# Patient Record
Sex: FEMALE | Race: WHITE | Employment: OTHER | RURAL
[De-identification: names, ages, dates, MRNs, and addresses within clinical notes are randomized per-mention and may not be internally consistent; named-entity substitution may affect disease eponyms.]

---

## 2017-03-21 RX ORDER — IBUPROFEN 800 MG/1
TABLET ORAL
Qty: 90 TAB | Refills: 0 | Status: SHIPPED | OUTPATIENT
Start: 2017-03-21 | End: 2017-12-21 | Stop reason: SDUPTHER

## 2017-03-21 NOTE — TELEPHONE ENCOUNTER
Patient is scheduled with Risa Corona on April 13. She states she's left her ibuprofen in [facility] so she's in need of it.

## 2017-03-24 RX ORDER — VENLAFAXINE 100 MG/1
TABLET ORAL
Qty: 60 TAB | Refills: 5 | Status: SHIPPED | OUTPATIENT
Start: 2017-03-24 | End: 2017-04-13 | Stop reason: SDUPTHER

## 2017-04-13 ENCOUNTER — OFFICE VISIT (OUTPATIENT)
Dept: FAMILY MEDICINE CLINIC | Age: 62
End: 2017-04-13

## 2017-04-13 VITALS
RESPIRATION RATE: 20 BRPM | TEMPERATURE: 97.9 F | DIASTOLIC BLOOD PRESSURE: 66 MMHG | OXYGEN SATURATION: 92 % | WEIGHT: 194.6 LBS | HEIGHT: 69 IN | HEART RATE: 81 BPM | BODY MASS INDEX: 28.82 KG/M2 | SYSTOLIC BLOOD PRESSURE: 102 MMHG

## 2017-04-13 DIAGNOSIS — Z79.4 UNCONTROLLED TYPE 2 DIABETES MELLITUS WITH HYPERGLYCEMIA, WITH LONG-TERM CURRENT USE OF INSULIN (HCC): ICD-10-CM

## 2017-04-13 DIAGNOSIS — E78.2 MIXED HYPERLIPIDEMIA: ICD-10-CM

## 2017-04-13 DIAGNOSIS — E11.65 UNCONTROLLED TYPE 2 DIABETES MELLITUS WITH HYPERGLYCEMIA, WITH LONG-TERM CURRENT USE OF INSULIN (HCC): ICD-10-CM

## 2017-04-13 DIAGNOSIS — J67.9 HYPERSENSITIVITY PNEUMONITIS (HCC): Primary | ICD-10-CM

## 2017-04-13 RX ORDER — OMEPRAZOLE 40 MG/1
CAPSULE, DELAYED RELEASE ORAL
COMMUNITY
End: 2017-04-13 | Stop reason: SDUPTHER

## 2017-04-13 RX ORDER — VENLAFAXINE 100 MG/1
TABLET ORAL
Qty: 180 TAB | Refills: 1 | Status: SHIPPED | OUTPATIENT
Start: 2017-04-13 | End: 2018-03-05 | Stop reason: SDUPTHER

## 2017-04-13 RX ORDER — SIMVASTATIN 40 MG/1
TABLET, FILM COATED ORAL
Qty: 90 TAB | Refills: 1 | Status: SHIPPED | OUTPATIENT
Start: 2017-04-13 | End: 2017-10-24 | Stop reason: ALTCHOICE

## 2017-04-13 RX ORDER — OMEPRAZOLE 40 MG/1
CAPSULE, DELAYED RELEASE ORAL
Qty: 90 CAP | Refills: 1 | Status: SHIPPED | OUTPATIENT
Start: 2017-04-13 | End: 2017-11-20 | Stop reason: SDUPTHER

## 2017-04-13 RX ORDER — MYCOPHENOLATE MOFETIL 500 MG/1
TABLET ORAL
COMMUNITY
End: 2017-08-17 | Stop reason: SDUPTHER

## 2017-04-13 RX ORDER — DAPSONE 100 MG/1
TABLET ORAL
COMMUNITY
End: 2017-10-25 | Stop reason: SDUPTHER

## 2017-04-13 NOTE — MR AVS SNAPSHOT
Visit Information Date & Time Provider Department Dept. Phone Encounter #  
 4/13/2017  4:00 PM Anh Nicholas NP [address] [phone] [id number] Upcoming Health Maintenance Date Due Hepatitis C Screening 1955 MICROALBUMIN Q1 10/14/1964 DTaP/Tdap/Td series (1 - Tdap) 10/14/1975 FOBT Q 1 YEAR AGE 50-75 10/14/2004 ZOSTER VACCINE AGE 60> 10/14/2014 HEMOGLOBIN A1C Q6M 3/8/2016 LIPID PANEL Q1 9/8/2016 EYE EXAM RETINAL OR DILATED Q1 3/24/2017 FOOT EXAM Q1 7/18/2017 BREAST CANCER SCRN MAMMOGRAM 8/17/2018 PAP AKA CERVICAL CYTOLOGY 9/16/2019 Allergies as of 4/13/2017  Review Complete On: 4/13/2017 By: Jeffrey Galeano RN Severity Noted Reaction Type Reactions Cephalosporins  08/19/2013    Unknown (comments) Told to avoid by doctor Codeine  08/19/2013    Vertigo Doxycycline  08/19/2013    Unknown (comments) Lodine [Etodolac]  11/27/2013    Hives Sweats, shakes Penicillins  08/19/2013    Hives Sulfa (Sulfonamide Antibiotics)  08/19/2013    Rash Current Immunizations  Reviewed on 12/15/2016 Name Date Hep A Vaccine (Adult) 10/11/2016 Hep B Vaccine (Adult) 12/15/2016, 10/11/2016 Influenza High Dose Vaccine PF 10/11/2016 Influenza Vaccine (Quad) 10/14/2015  4:46 PM  
 Pneumococcal Conjugate (PCV-13) 3/16/2016 Pneumococcal Polysaccharide (PPSV-23) 8/1/2013 Not reviewed this visit You Were Diagnosed With   
  
 Codes Comments Hypersensitivity pneumonitis ([facility] [address]    -  Primary ICD-10-CM: J67.9 ICD-9-CM: 495.9 Vitals BP Pulse Temp Resp Height(growth percentile) 102/66 (BP 1 Location: Left arm, BP Patient Position: Sitting) 81 97.9 °F (36.6 °C) (Temporal) 20 5' 9\" (1.753 m) Weight(growth percentile) SpO2 BMI OB Status Smoking Status 194 lb 9.6 oz (88.3 kg) 92% 28.74 kg/m2 Postmenopausal Former Smoker Vitals History BMI and BSA Data Body Mass Index Body Surface Area 28.74 kg/m 2 2.07 m 2 Preferred Pharmacy Pharmacy Name Phone [phone], [place] [phone] Your Updated Medication List  
  
   
This list is accurate as of: 4/13/17  5:04 PM.  Always use your most recent med list.  
  
  
  
  
 ALPRAZolam 0.5 mg tablet Commonly known as:  He Davis Take 1 Tab by mouth nightly as needed for Anxiety or Sleep. Max Daily Amount: 0.5 mg. Indications: ANXIETY  
  
 aspirin 81 mg tablet Take 81 mg by mouth.  
  
 calcium-vitamin D 500 mg(1,250mg) -200 unit per tablet Commonly known as:  OYSTER SHELL Take 1 Tab by mouth two (2) times daily (with meals). dapsone 100 mg tablet Take 100 mg by mouth daily. FIBER LAXATIVE (CA POLYCARBO) 625 mg tablet Generic drug:  calcium polycarbophil Take 625 mg by mouth daily. Started in december  
  
 fluticasone 50 mcg/actuation nasal spray Commonly known as:  FLONASE  
2 sprays in each nostril once a day. glucose blood VI test strips strip Commonly known as:  ASCENSIA AUTODISC VI, ONE TOUCH ULTRA TEST VI  
Use as directed. DX E11.65 HumaLOG KwikPen 100 unit/mL kwikpen Generic drug:  insulin lispro INJECT 4 units SUBCUTANEOUSLY WITH MEALS AS DIRECTED  
  
 ibuprofen 800 mg tablet Commonly known as:  MOTRIN  
TAKE ONE TABLET BY MOUTH EVERY 6 HOURS WITH FOOD AS NEEDED  Indications: OSTEOARTHRITIS  
  
 insulin detemir 100 unit/mL (3 mL) Inpn Commonly known as:  LEVEMIR FLEXPEN  
40 units sq bid  Indications: DIABETES MELLITUS WITH SEVERE INSULIN RESISTANCE  
  
 insulin syringe-needle U-100 1/2 mL 31 gauge x 15/64\" Syrg Commonly known as:  BD INSULIN SYRINGE ULTRA-FINE  
1 Units by Does Not Apply route daily as needed. DX E11.65  
  
 latanoprost 0.005 % ophthalmic solution Commonly known as:  Maria Elena Gonzáles Administer 1 Drop to both eyes nightly. losartan 100 mg tablet Commonly known as:  COZAAR Take 1 Tab by mouth daily. meclizine 12.5 mg tablet Commonly known as:  ANTIVERT Take 12.5 mg by mouth three (3) times daily as needed. TAKE 1 PILL A DAY PRN  
  
 metFORMIN  mg tablet Commonly known as:  GLUCOPHAGE XR Take 1 Tab by mouth two (2) times daily (with meals). multivitamin tablet Commonly known as:  ONE A DAY Take 1 Tab by mouth daily. mycophenolate 500 mg tablet Commonly known as:  CELLCEPT Take 1,000 mg by mouth 2 times daily. Do not chew or crush tablet Omega-3-DHA-EPA-Fish Oil 1,000 mg (120 mg-180 mg) Cap Take  by mouth. TAKE TWICE A DAY  
  
 omeprazole 40 mg capsule Commonly known as:  PRILOSEC Take 40 mg by mouth daily. simvastatin 40 mg tablet Commonly known as:  ZOCOR  
TAKE ONE TABLET BY MOUTH EVERY NIGHT  
  
 timolol maleate 0.5 % Drpd ophthalmic solution Administer 1 Drop to both eyes daily. venlafaxine 100 mg tablet Commonly known as:  EFFEXOR  
TAKE ONE TABLET BY MOUTH 2 TIMES A DAY Prescriptions Sent to Pharmacy Refills  
 venlafaxine (EFFEXOR) 100 mg tablet 1 Sig: TAKE ONE TABLET BY MOUTH 2 TIMES A DAY Class: Normal  
 Pharmacy: [address] [place] [address] Ph #: [phone]  
 omeprazole (PRILOSEC) 40 mg capsule 1 Sig: Take 40 mg by mouth daily. Class: Normal  
 Pharmacy: [address] [place] [address] Ph #: [phone]  
 simvastatin (ZOCOR) 40 mg tablet 1 Sig: TAKE ONE TABLET BY MOUTH EVERY NIGHT Class: Normal  
 Pharmacy: [address] [place] [address] Ph #: [phone] We Performed the Following REFERRAL TO PULMONARY DISEASE [W52 Custom] Comments:  
 Dr. Trenton Kincaid NEEDS A PRIOR AUTHORIZATION PER INSURANCE. Dr. Ian Ahumada. Rosi Bender MD 
 
 
Internist in [place], [place] Address: [address] [place] [address] [place] Phone: [phone] Referral Information Referral ID Referred By Referred To  
  
 [id number] Catana Meigs [facility] [place] [address] [place] [address] [facility] Visits Status Start Date End Date 1 New Request 4/13/17 4/13/18 If your referral has a status of pending review or denied, additional information will be sent to support the outcome of this decision. Patient Instructions Learning About Healthy Weight What is a healthy weight? A healthy weight is the weight at which you feel good about yourself and have energy for work and play. It's also one that lowers your risk for health problems. What can you do to stay at a healthy weight? It can be hard to stay at a healthy weight, especially when fast food, vending-machine snacks, and processed foods are so easy to find. And with your busy lifestyle, activity may be low on your list of things to do. But staying at a healthy weight may be easier than you think. Here are some dos and don'ts for staying at a healthy weight: 
Do eat healthy foods The kinds of foods you eat have a big impact on both your weight and your health. Reaching and staying at a healthy weight is not about going on a diet. It's about making healthier food choices every day and changing your diet for good. Healthy eating means eating a variety of foods so that you get all the nutrients you need. Your body needs protein, carbohydrate, and fats for energy. They keep your heart beating, your brain active, and your muscles working. On most days, try to eat from each food group. This means eating a variety of: · Whole grains, such as whole wheat breads and pastas. · Fruits and vegetables. · Dairy products, such as low-fat milk, yogurt, and cheese. · Lean proteins, such as all types of fish, chicken without the skin, and beans. Don't have too much or too little of one thing. All foods, if eaten in moderation, can be part of healthy eating. Even sweets can be okay. If your favorite foods are high in fat, salt, sugar, or calories, limit how often you eat them. Eat smaller servings, or look for healthy substitutes. Do watch what you eat Many people eat more than their bodies need. Part of staying at a healthy weight means learning how much food you really need from day to day and not eating more than that. Even with healthy foods, eating too much can make you gain weight. Having a well-balanced diet means that you eat enough, but not too much, and that your food gives you the nutrients you need to stay healthy. So listen to your body. Eat when you're hungry. Stop when you feel satisfied. It's a good idea to have healthy snacks ready for when you get hungry. Keep healthy snacks with you at work, in your car, and at home. If you have a healthy snack easily available, you'll be less likely to pick a candy bar or bag of chips from a vending machine instead. Some healthy snacks you might want to keep on hand are fruit, low-fat yogurt, string cheese, low-fat microwave popcorn, raisins and other dried fruit, nuts, whole wheat crackers, pretzels, carrots, celery sticks, and broccoli. Do some physical activity A big part of reaching and staying at a healthy weight is being active. When you're active, you burn calories. This makes it easier to reach and stay at a healthy weight. When you're active on a regular basis, your body burns more calories, even when you're at rest. Being active helps you lose fat and build lean muscle. Try to be active for at least 1 hour every day. This may sound like a lot, but it's okay to be active in smaller blocks of time that add up to 1 hour a day. Any activity that makes your heart beat faster and keeps it there for a while counts.  A brisk walk, run, or swim will get your heart beating faster. So will climbing stairs, shooting baskets, or cycling. Even some household chores like vacuuming and mowing the lawn will get your heart rate up. Pick activities that you enjoyones that make your heart beat faster, your muscles stronger, and your muscles and joints more flexible. If you find more than one thing you like doing, do them all. You don't have to do the same thing every day. Don't diet Diets don't work. Diets are temporary. Because you give up so much when you diet, you may be hungry and think about food all the time. And after you stop dieting, you also may overeat to make up for what you missed. Most people who diet end up gaining back the pounds they lostand more. Remember that healthy bodies come in lots of shapes and sizes. Everyone can get healthier by eating better and being more active. Where can you learn more? Go to http://teodoroKimbiaroxi.info/. Enter 122 1418 in the search box to learn more about \"Learning About Healthy Weight. \" Current as of: October 13, 2016 Content Version: 11.2 © 3190-1828 [employer]QUAL. Care instructions adapted under license by [employer] (which disclaims liability or warranty for this information). If you have questions about a medical condition or this instruction, always ask your healthcare professional. [address] any warranty or liability for your use of this information. Learning About Dietary Guidelines What are the Dietary Guidelines for Americans? Dietary Guidelines for Americans provide tips for eating well and staying healthy. This helps reduce the risk for long-term (chronic) diseases. These adult guidelines from the [place] recommend that you: 
· Eat lots of fruits, vegetables, whole grains, and low-fat or nonfat dairy products. · Try to balance your eating with your activity. This helps you stay at a healthy weight. · Drink alcohol in moderation, if at all. · Limit foods high in salt, saturated fat, trans fat, and added sugar. What is MyPlate? MyPlate is the U.S. government's food guide. It can help you make your own well-balanced eating plan. A balanced eating plan means that you eat enough, but not too much, and that your food gives you the nutrients you need to stay healthy. MyPlate focuses on eating plenty of whole grains, fruits, and vegetables, and on limiting fat and sugar. It is available online at www. ChooseMyPlate.gov. How can you get started? MyPlate suggests that most adults eat certain amounts from the different food groups: 
Grains Eat 5 to 8 ounces of grains each day. Half of those should be whole grains. Choose whole-grain breads, cold and cooked cereals and grains, pasta (without creamy sauces), hard rolls, or low-fat or fat-free crackers. Vegetables Eat 2 to 3 cups of vegetables every day. They contain little if any fat. And they have lots of nutrients that help protect against heart disease. Fruits Eat 1½ to 2 cups of fruits every day. Fruits contain very little fat but lots of nutrients. Protein foods Most adults need 5 to 6½ ounces each day. Choose fish and lean poultry more often. Eat red meat and fried meats less often. Dried beans, tofu, and nuts are also good sources of protein. Dairy Most adults need 3 cups of milk and milk products a day. Choose low-fat or fat-free products from this food group. If you have problems digesting milk, try eating cheese or yogurt instead. Limit fats and oils, including those used in cooking. When you do use fats, choose oils that are liquid at room temperature (unsaturated fats). These include canola oil and olive oil. Avoid foods with trans fats, such as many fried foods, cookies, and snack foods. Where can you learn more? Go to http://teodoro-roxi.info/.  
Enter H970 in the search box to learn more about \"Learning About Dietary Guidelines. \" Current as of: July 26, 2016 Content Version: 11.2 © 9323-2194 Filmmortal. Care instructions adapted under license by [employer] (which disclaims liability or warranty for this information). If you have questions about a medical condition or this instruction, always ask your healthcare professional. [address] any warranty or liability for your use of this information. Heart-Healthy Diet: Care Instructions Your Care Instructions A heart-healthy diet has lots of vegetables, fruits, nuts, beans, and whole grains, and is low in salt. It limits foods that are high in saturated fat, such as meats, cheeses, and fried foods. It may be hard to change your diet, but even small changes can lower your risk of heart attack and heart disease. Follow-up care is a key part of your treatment and safety. Be sure to make and go to all appointments, and call your doctor if you are having problems. It's also a good idea to know your test results and keep a list of the medicines you take. How can you care for yourself at home? Watch your portions · Learn what a serving is. A \"serving\" and a \"portion\" are not always the same thing. Make sure that you are not eating larger portions than are recommended. For example, a serving of pasta is ½ cup. A serving size of meat is 2 to 3 ounces. A 3-ounce serving is about the size of a deck of cards. Measure serving sizes until you are good at [place]" them. Keep in mind that restaurants often serve portions that are 2 or 3 times the size of one serving. · To keep your energy level up and keep you from feeling hungry, eat often but in smaller portions. · Eat only the number of calories you need to stay at a healthy weight. If you need to lose weight, eat fewer calories than your body burns (through exercise and other physical activity). Eat more fruits and vegetables · Eat a variety of fruit and vegetables every day. Dark green, deep orange, red, or yellow fruits and vegetables are especially good for you. Examples include spinach, carrots, peaches, and berries. · Keep carrots, celery, and other veggies handy for snacks. Buy fruit that is in season and store it where you can see it so that you will be tempted to eat it. · Cook dishes that have a lot of veggies in them, such as stir-fries and soups. Limit saturated and trans fat · Read food labels, and try to avoid saturated and trans fats. They increase your risk of heart disease. Trans fat is found in many processed foods such as cookies and crackers. · Use olive or canola oil when you cook. Try cholesterol-lowering spreads, such as Benecol or Take Control. · Bake, broil, grill, or steam foods instead of frying them. · Choose lean meats instead of high-fat meats such as hot dogs and sausages. Cut off all visible fat when you prepare meat. · Eat fish, skinless poultry, and meat alternatives such as soy products instead of high-fat meats. Soy products, such as tofu, may be especially good for your heart. · Choose low-fat or fat-free milk and dairy products. Eat fish · Eat at least two servings of fish a week. Certain fish, such as salmon and tuna, contain omega-3 fatty acids, which may help reduce your risk of heart attack. Eat foods high in fiber · Eat a variety of grain products every day. Include whole-grain foods that have lots of fiber and nutrients. Examples of whole-grain foods include oats, whole wheat bread, and brown rice. · Buy whole-grain breads and cereals, instead of white bread or pastries. Limit salt and sodium · Limit how much salt and sodium you eat to help lower your blood pressure. · Taste food before you salt it. Add only a little salt when you think you need it. With time, your taste buds will adjust to less salt. · Eat fewer snack items, fast foods, and other high-salt, processed foods. Check food labels for the amount of sodium in packaged foods. · Choose low-sodium versions of canned goods (such as soups, vegetables, and beans). Limit sugar · Limit drinks and foods with added sugar. These include candy, desserts, and soda pop. Limit alcohol · Limit alcohol to no more than 2 drinks a day for men and 1 drink a day for women. Too much alcohol can cause health problems. When should you call for help? Watch closely for changes in your health, and be sure to contact your doctor if: 
· You would like help planning heart-healthy meals. Where can you learn more? Go to http://teodoro-roxi.info/. Enter V137 in the search box to learn more about \"Heart-Healthy Diet: Care Instructions. \" Current as of: January 27, 2016 Content Version: 11.2 © 9727-0598 [employer] Mask. Care instructions adapted under license by flo.do (which disclaims liability or warranty for this information). If you have questions about a medical condition or this instruction, always ask your healthcare professional. [place] [address] any warranty or liability for your use of this information. Eating Healthy Foods: Care Instructions Your Care Instructions Eating healthy foods can help lower your risk for disease. Healthy food gives you energy and keeps your heart strong, your brain active, your muscles working, and your bones strong. A healthy diet includes a variety of foods from the basic food groups: grains, vegetables, fruits, milk and milk products, and meat and beans. Some people may eat more of their favorite foods from only one food group and, as a result, miss getting the nutrients they need. So, it is important to pay attention not only to what you eat but also to what you are missing from your diet. You can eat a healthy, balanced diet by making a few small changes. Follow-up care is a key part of your treatment and safety.  Be sure to make and go to all appointments, and call your doctor if you are having problems. Its also a good idea to know your test results and keep a list of the medicines you take. How can you care for yourself at home? Look at what you eat · Keep a food diary for a week or two and record everything you eat or drink. Track the number of servings you eat from each food group. · For a balanced diet every day, eat a variety of: ¨ 6 or more ounce-equivalents of grains, such as cereals, breads, crackers, rice, or pasta, every day. An ounce-equivalent is 1 slice of bread, 1 cup of ready-to-eat cereal, or ½ cup of cooked rice, cooked pasta, or cooked cereal. 
¨ 2½ cups of vegetables, especially: § Dark-green vegetables such as broccoli and spinach. § Orange vegetables such as carrots and sweet potatoes. § Dry beans (such as fam and kidney beans) and peas (such as lentils). ¨ 2 cups of fresh, frozen, or canned fruit. A small apple or 1 banana or orange equals 1 cup. ¨ 3 cups of nonfat or low-fat milk, yogurt, or other milk products. ¨ 5½ ounces of meat and beans, such as chicken, fish, lean meat, beans, nuts, and seeds. One egg, 1 tablespoon of peanut butter, ½ ounce nuts or seeds, or ¼ cup of cooked beans equals 1 ounce of meat. · Learn how to read food labels for serving sizes and ingredients. Fast-food and convenience-food meals often contain few or no fruits or vegetables. Make sure you eat some fruits and vegetables to make the meal more nutritious. · Look at your food diary. For each food group, add up what you have eaten and then divide the total by the number of days. This will give you an idea of how much you are eating from each food group. See if you can find some ways to change your diet to make it more healthy. Start small · Do not try to make dramatic changes to your diet all at once. You might feel that you are missing out on your favorite foods and then be more likely to fail. · Start slowly, and gradually change your habits. Try some of the following: ¨ Use whole wheat bread instead of white bread. ¨ Use nonfat or low-fat milk instead of whole milk. ¨ Eat brown rice instead of white rice, and eat whole wheat pasta instead of white-flour pasta. ¨ Try low-fat cheeses and low-fat yogurt. ¨ Add more fruits and vegetables to meals and have them for snacks. ¨ Add lettuce, tomato, cucumber, and onion to sandwiches. ¨ Add fruit to yogurt and cereal. 
Enjoy food · You can still eat your favorite foods. You just may need to eat less of them. If your favorite foods are high in fat, salt, and sugar, limit how often you eat them, but do not cut them out entirely. · Eat a wide variety of foods. Make healthy choices when eating out · The type of restaurant you choose can help you make healthy choices. Even fast-food chains are now offering more low-fat or healthier choices on the menu. · Choose smaller portions, or take half of your meal home. · When eating out, try: ¨ A veggie pizza with a whole wheat crust or grilled chicken (instead of sausage or pepperoni). ¨ Pasta with roasted vegetables, grilled chicken, or marinara sauce instead of cream sauce. ¨ A vegetable wrap or grilled chicken wrap. ¨ Broiled or poached food instead of fried or breaded items. Make healthy choices easy · Buy packaged, prewashed, ready-to-eat fresh vegetables and fruits, such as baby carrots, salad mixes, and chopped or shredded broccoli and cauliflower. · Buy packaged, presliced fruits, such as melon or pineapple. · Choose 100% fruit or vegetable juice instead of soda. Limit juice intake to 4 to 6 oz (½ to ¾ cup) a day. · Blend low-fat yogurt, fruit juice, and canned or frozen fruit to make a smoothie for breakfast or a snack. Where can you learn more? Go to http://teodoro-roxi.info/. Enter T756 in the search box to learn more about \"Eating Healthy Foods: Care Instructions. \" 
 Current as of: November 20, 2015 Content Version: 11.2 © 7412-5406 [employer] Exam Prep Platform. Care instructions adapted under license by [employer] (which disclaims liability or warranty for this information). If you have questions about a medical condition or this instruction, always ask your healthcare professional. [address] any warranty or liability for your use of this information. Food as Fuel: Care Instructions Your Care Instructions A healthy, balanced diet gives your body nutrients. Nutrients are like fuel for your body. They give you energy. And they keep your heart beating, your brain active, and your muscles working. They also help to build and strengthen bones, muscles, and other body tissues. Your body needs three major nutrients for energy. These are carbohydrate, protein, and fat. · Carbohydrate provides energy for your brain, muscles, heart, and lungs. It is found in bread, cereal, rice, pasta, fruits, vegetables, milk, yogurt, and sugar. · Protein provides energy and helps build and repair your body's cells. It is found in meat, poultry, fish, cooked dry beans, cheese, tofu and other soy products, nuts and seeds, and milk and milk products. · Fat provides energy, helps build the covering around nerves in your body, and helps make hormones. Fat is found in butter, margarine, oil, mayonnaise, salad dressing, and nuts. It is also found in most foods that come from animals, such as meat and milk products. Many foods also have fat added to them. Your body needs all three of these nutrients to be healthy. If you choose a good mix of foods, you can help your body get the right amounts of carbohydrate, protein, and fat. It can also keep you at a healthy weight. Follow-up care is a key part of your treatment and safety.  Be sure to make and go to all appointments, and call your doctor if you are having problems. It's also a good idea to know your test results and keep a list of the medicines you take. How can you care for yourself at home? Eat a balanced diet · Try to eat variety of healthy foods every day. These include: ¨ 5 to 8 ounces of bread, cereal, crackers, rice, or pasta. An ounce is about 1 slice of bread, ¾ cup of breakfast cereal, or ½ cup of cooked rice, cereal, or pasta. Choose whole-grain products for at least half of your grain servings. ¨ 2 to 3 cups of vegetables. Be sure to include: § Dark green vegetables such as broccoli and spinach. § Orange vegetables such as carrots and sweet potatoes. ¨ 1½ to 2 cups of fresh, frozen, or canned fruit. A banana, an orange, or a large apple equals 1 cup. ¨ 3 cups of nonfat or low-fat milk, yogurt, or other milk products. ¨ 5 to 6½ ounces of protein foods. These include chicken, fish, lean meat, beans, nuts, and seeds. One egg equals 1 ounce of meat, poultry, or fish. A ½ cup of cooked beans equals 2 ounces of protein. Stay fueled all day · Start your day with breakfast. If you don't have time to sit down for a bowl of cereal in the morning, try something that you can eat \"on the go. \" Try a piece of whole wheat bread with peanut butter or a container of yogurt with frozen berries mixed in. · Eat regularly scheduled meals and snacks. If you miss a meal, you may overeat at the next meal. Or you may choose a less healthy snack. · Drink enough water. (If you have kidney, heart, or liver disease and have to limit fluids, talk with your doctor before you increase your fluid intake.) Where can you learn more? Go to http://teodoro-roxi.info/. Enter T090 in the search box to learn more about \"Food as Fuel: Care Instructions. \" Current as of: July 26, 2016 Content Version: 11.2 © 5864-2010 [employer].  Care instructions adapted under license by [employer] (which disclaims liability or warranty for this information). If you have questions about a medical condition or this instruction, always ask your healthcare professional. [address] any warranty or liability for your use of this information. Introducing [facility]! Dear Tosha Watkins: Thank you for requesting a ally[facility]VM account. Our records indicate that you have previously registered for a allyDVM account but its currently inactive. Please call our allyDVM support line at [phone]. Additional Information If you have questions, please visit the Frequently Asked Questions section of the allyDVM website at [email or web address] Remember, allyDVM is NOT to be used for urgent needs. For medical emergencies, dial 911. Now available from your iPhone and Android! Please provide this summary of care documentation to your next provider. Your primary care clinician is listed as Rolando Conde. If you have any questions after today's visit, please call [phone].

## 2017-04-13 NOTE — PATIENT INSTRUCTIONS
Learning About Healthy Weight  What is a healthy weight? A healthy weight is the weight at which you feel good about yourself and have energy for work and play. It's also one that lowers your risk for health problems. What can you do to stay at a healthy weight? It can be hard to stay at a healthy weight, especially when fast food, vending-machine snacks, and processed foods are so easy to find. And with your busy lifestyle, activity may be low on your list of things to do. But staying at a healthy weight may be easier than you think. Here are some dos and don'ts for staying at a healthy weight:  Do eat healthy foods  The kinds of foods you eat have a big impact on both your weight and your health. Reaching and staying at a healthy weight is not about going on a diet. It's about making healthier food choices every day and changing your diet for good. Healthy eating means eating a variety of foods so that you get all the nutrients you need. Your body needs protein, carbohydrate, and fats for energy. They keep your heart beating, your brain active, and your muscles working. On most days, try to eat from each food group. This means eating a variety of:  · Whole grains, such as whole wheat breads and pastas. · Fruits and vegetables. · Dairy products, such as low-fat milk, yogurt, and cheese. · Lean proteins, such as all types of fish, chicken without the skin, and beans. Don't have too much or too little of one thing. All foods, if eaten in moderation, can be part of healthy eating. Even sweets can be okay. If your favorite foods are high in fat, salt, sugar, or calories, limit how often you eat them. Eat smaller servings, or look for healthy substitutes. Do watch what you eat  Many people eat more than their bodies need. Part of staying at a healthy weight means learning how much food you really need from day to day and not eating more than that.  Even with healthy foods, eating too much can make you gain weight. Having a well-balanced diet means that you eat enough, but not too much, and that your food gives you the nutrients you need to stay healthy. So listen to your body. Eat when you're hungry. Stop when you feel satisfied. It's a good idea to have healthy snacks ready for when you get hungry. Keep healthy snacks with you at work, in your car, and at home. If you have a healthy snack easily available, you'll be less likely to pick a candy bar or bag of chips from a vending machine instead. Some healthy snacks you might want to keep on hand are fruit, low-fat yogurt, string cheese, low-fat microwave popcorn, raisins and other dried fruit, nuts, whole wheat crackers, pretzels, carrots, celery sticks, and broccoli. Do some physical activity  A big part of reaching and staying at a healthy weight is being active. When you're active, you burn calories. This makes it easier to reach and stay at a healthy weight. When you're active on a regular basis, your body burns more calories, even when you're at rest. Being active helps you lose fat and build lean muscle. Try to be active for at least 1 hour every day. This may sound like a lot, but it's okay to be active in smaller blocks of time that add up to 1 hour a day. Any activity that makes your heart beat faster and keeps it there for a while counts. A brisk walk, run, or swim will get your heart beating faster. So will climbing stairs, shooting baskets, or cycling. Even some household chores like vacuuming and mowing the lawn will get your heart rate up. Pick activities that you enjoy--ones that make your heart beat faster, your muscles stronger, and your muscles and joints more flexible. If you find more than one thing you like doing, do them all. You don't have to do the same thing every day. Don't diet  Diets don't work. Diets are temporary. Because you give up so much when you diet, you may be hungry and think about food all the time.  And after you stop dieting, you also may overeat to make up for what you missed. Most people who diet end up gaining back the pounds they lost--and more. Remember that healthy bodies come in lots of shapes and sizes. Everyone can get healthier by eating better and being more active. Where can you learn more? Go to http://teodoro-roxi.info/. Enter 418 6439 in the search box to learn more about \"Learning About Healthy Weight. \"  Current as of: October 13, 2016  Content Version: 11.2  © 9358-9463 [employer]. Care instructions adapted under license by [employer] (which disclaims liability or warranty for this information). If you have questions about a medical condition or this instruction, always ask your healthcare professional. [address] any warranty or liability for your use of this information. Learning About Dietary Guidelines  What are the Dietary Guidelines for Americans? Dietary Guidelines for Americans provide tips for eating well and staying healthy. This helps reduce the risk for long-term (chronic) diseases. These adult guidelines from the [place] recommend that you:  · Eat lots of fruits, vegetables, whole grains, and low-fat or nonfat dairy products. · Try to balance your eating with your activity. This helps you stay at a healthy weight. · Drink alcohol in moderation, if at all. · Limit foods high in salt, saturated fat, trans fat, and added sugar. What is MyPlate? MyPlate is the [place] government's food guide. It can help you make your own well-balanced eating plan. A balanced eating plan means that you eat enough, but not too much, and that your food gives you the nutrients you need to stay healthy. MyPlate focuses on eating plenty of whole grains, fruits, and vegetables, and on limiting fat and sugar. It is available online at www. ChooseMyPlate.gov. How can you get started?   MyPlate suggests that most adults eat certain amounts from the different food groups:  Grains  Eat 5 to 8 ounces of grains each day. Half of those should be whole grains. Choose whole-grain breads, cold and cooked cereals and grains, pasta (without creamy sauces), hard rolls, or low-fat or fat-free crackers. Vegetables  Eat 2 to 3 cups of vegetables every day. They contain little if any fat. And they have lots of nutrients that help protect against heart disease. Fruits  Eat 1½ to 2 cups of fruits every day. Fruits contain very little fat but lots of nutrients. Protein foods  Most adults need 5 to 6½ ounces each day. Choose fish and lean poultry more often. Eat red meat and fried meats less often. Dried beans, tofu, and nuts are also good sources of protein. Dairy  Most adults need 3 cups of milk and milk products a day. Choose low-fat or fat-free products from this food group. If you have problems digesting milk, try eating cheese or yogurt instead. Limit fats and oils, including those used in cooking. When you do use fats, choose oils that are liquid at room temperature (unsaturated fats). These include canola oil and olive oil. Avoid foods with trans fats, such as many fried foods, cookies, and snack foods. Where can you learn more? Go to http://teodoro-roxi.info/. Enter X829 in the search box to learn more about \"Learning About Dietary Guidelines. \"  Current as of: July 26, 2016  Content Version: 11.2  © 5940-6932 [employer]. Care instructions adapted under license by [employer] (which disclaims liability or warranty for this information). If you have questions about a medical condition or this instruction, always ask your healthcare professional. [place] [address] any warranty or liability for your use of this information.        Heart-Healthy Diet: Care Instructions  Your Care Instructions    A heart-healthy diet has lots of vegetables, fruits, nuts, beans, and whole grains, and is low in salt. It limits foods that are high in saturated fat, such as meats, cheeses, and fried foods. It may be hard to change your diet, but even small changes can lower your risk of heart attack and heart disease. Follow-up care is a key part of your treatment and safety. Be sure to make and go to all appointments, and call your doctor if you are having problems. It's also a good idea to know your test results and keep a list of the medicines you take. How can you care for yourself at home? Watch your portions  · Learn what a serving is. A \"serving\" and a \"portion\" are not always the same thing. Make sure that you are not eating larger portions than are recommended. For example, a serving of pasta is ½ cup. A serving size of meat is 2 to 3 ounces. A 3-ounce serving is about the size of a deck of cards. Measure serving sizes until you are good at Rock" them. Keep in mind that restaurants often serve portions that are 2 or 3 times the size of one serving. · To keep your energy level up and keep you from feeling hungry, eat often but in smaller portions. · Eat only the number of calories you need to stay at a healthy weight. If you need to lose weight, eat fewer calories than your body burns (through exercise and other physical activity). Eat more fruits and vegetables  · Eat a variety of fruit and vegetables every day. Dark green, deep orange, red, or yellow fruits and vegetables are especially good for you. Examples include spinach, carrots, peaches, and berries. · Keep carrots, celery, and other veggies handy for snacks. Buy fruit that is in season and store it where you can see it so that you will be tempted to eat it. · Cook dishes that have a lot of veggies in them, such as stir-fries and soups. Limit saturated and trans fat  · Read food labels, and try to avoid saturated and trans fats. They increase your risk of heart disease.  Trans fat is found in many processed foods such as cookies and crackers. · Use olive or canola oil when you cook. Try cholesterol-lowering spreads, such as Benecol or Take Control. · Bake, broil, grill, or steam foods instead of frying them. · Choose lean meats instead of high-fat meats such as hot dogs and sausages. Cut off all visible fat when you prepare meat. · Eat fish, skinless poultry, and meat alternatives such as soy products instead of high-fat meats. Soy products, such as tofu, may be especially good for your heart. · Choose low-fat or fat-free milk and dairy products. Eat fish  · Eat at least two servings of fish a week. Certain fish, such as salmon and tuna, contain omega-3 fatty acids, which may help reduce your risk of heart attack. Eat foods high in fiber  · Eat a variety of grain products every day. Include whole-grain foods that have lots of fiber and nutrients. Examples of whole-grain foods include oats, whole wheat bread, and brown rice. · Buy whole-grain breads and cereals, instead of white bread or pastries. Limit salt and sodium  · Limit how much salt and sodium you eat to help lower your blood pressure. · Taste food before you salt it. Add only a little salt when you think you need it. With time, your taste buds will adjust to less salt. · Eat fewer snack items, fast foods, and other high-salt, processed foods. Check food labels for the amount of sodium in packaged foods. · Choose low-sodium versions of canned goods (such as soups, vegetables, and beans). Limit sugar  · Limit drinks and foods with added sugar. These include candy, desserts, and soda pop. Limit alcohol  · Limit alcohol to no more than 2 drinks a day for men and 1 drink a day for women. Too much alcohol can cause health problems. When should you call for help? Watch closely for changes in your health, and be sure to contact your doctor if:  · You would like help planning heart-healthy meals. Where can you learn more?   Go to http://teodoro-roxi.info/. Enter V137 in the search box to learn more about \"Heart-Healthy Diet: Care Instructions. \"  Current as of: January 27, 2016  Content Version: 11.2  © 5664-4284 [employer]. Care instructions adapted under license by [email or web address] (which disclaims liability or warranty for this information). If you have questions about a medical condition or this instruction, always ask your healthcare professional. [address] any warranty or liability for your use of this information. Eating Healthy Foods: Care Instructions  Your Care Instructions  Eating healthy foods can help lower your risk for disease. Healthy food gives you energy and keeps your heart strong, your brain active, your muscles working, and your bones strong. A healthy diet includes a variety of foods from the basic food groups: grains, vegetables, fruits, milk and milk products, and meat and beans. Some people may eat more of their favorite foods from only one food group and, as a result, miss getting the nutrients they need. So, it is important to pay attention not only to what you eat but also to what you are missing from your diet. You can eat a healthy, balanced diet by making a few small changes. Follow-up care is a key part of your treatment and safety. Be sure to make and go to all appointments, and call your doctor if you are having problems. Its also a good idea to know your test results and keep a list of the medicines you take. How can you care for yourself at home? Look at what you eat  · Keep a food diary for a week or two and record everything you eat or drink. Track the number of servings you eat from each food group. · For a balanced diet every day, eat a variety of:  ¨ 6 or more ounce-equivalents of grains, such as cereals, breads, crackers, rice, or pasta, every day.  An ounce-equivalent is 1 slice of bread, 1 cup of ready-to-eat cereal, or ½ cup of cooked rice, cooked pasta, or cooked cereal.  ¨ 2½ cups of vegetables, especially:  § Dark-green vegetables such as broccoli and spinach. § Orange vegetables such as carrots and sweet potatoes. § Dry beans (such as fam and kidney beans) and peas (such as lentils). ¨ 2 cups of fresh, frozen, or canned fruit. A small apple or 1 banana or orange equals 1 cup. ¨ 3 cups of nonfat or low-fat milk, yogurt, or other milk products. ¨ 5½ ounces of meat and beans, such as chicken, fish, lean meat, beans, nuts, and seeds. One egg, 1 tablespoon of peanut butter, ½ ounce nuts or seeds, or ¼ cup of cooked beans equals 1 ounce of meat. · Learn how to read food labels for serving sizes and ingredients. Fast-food and convenience-food meals often contain few or no fruits or vegetables. Make sure you eat some fruits and vegetables to make the meal more nutritious. · Look at your food diary. For each food group, add up what you have eaten and then divide the total by the number of days. This will give you an idea of how much you are eating from each food group. See if you can find some ways to change your diet to make it more healthy. Start small  · Do not try to make dramatic changes to your diet all at once. You might feel that you are missing out on your favorite foods and then be more likely to fail. · Start slowly, and gradually change your habits. Try some of the following:  ¨ Use whole wheat bread instead of white bread. ¨ Use nonfat or low-fat milk instead of whole milk. ¨ Eat brown rice instead of white rice, and eat whole wheat pasta instead of white-flour pasta. ¨ Try low-fat cheeses and low-fat yogurt. ¨ Add more fruits and vegetables to meals and have them for snacks. ¨ Add lettuce, tomato, cucumber, and onion to sandwiches. ¨ Add fruit to yogurt and cereal.  Enjoy food  · You can still eat your favorite foods. You just may need to eat less of them.  If your favorite foods are high in fat, salt, and sugar, limit how often you eat them, but do not cut them out entirely. · Eat a wide variety of foods. Make healthy choices when eating out  · The type of restaurant you choose can help you make healthy choices. Even fast-food chains are now offering more low-fat or healthier choices on the menu. · Choose smaller portions, or take half of your meal home. · When eating out, try:  ¨ A veggie pizza with a whole wheat crust or grilled chicken (instead of sausage or pepperoni). ¨ Pasta with roasted vegetables, grilled chicken, or marinara sauce instead of cream sauce. ¨ A vegetable wrap or grilled chicken wrap. ¨ Broiled or poached food instead of fried or breaded items. Make healthy choices easy  · Buy packaged, prewashed, ready-to-eat fresh vegetables and fruits, such as baby carrots, salad mixes, and chopped or shredded broccoli and cauliflower. · Buy packaged, presliced fruits, such as melon or pineapple. · Choose 100% fruit or vegetable juice instead of soda. Limit juice intake to 4 to 6 oz (½ to ¾ cup) a day. · Blend low-fat yogurt, fruit juice, and canned or frozen fruit to make a smoothie for breakfast or a snack. Where can you learn more? Go to http://teodoro-roxi.info/. Enter T756 in the search box to learn more about \"Eating Healthy Foods: Care Instructions. \"  Current as of: November 20, 2015  Content Version: 11.2  © 5368-1339 [employer]. Care instructions adapted under license by [employer] (which disclaims liability or warranty for this information). If you have questions about a medical condition or this instruction, always ask your healthcare professional. [place] [address] any warranty or liability for your use of this information. Food as Fuel: Care Instructions  Your Care Instructions  A healthy, balanced diet gives your body nutrients. Nutrients are like fuel for your body. They give you energy.  And they keep your heart beating, your brain active, and your muscles working. They also help to build and strengthen bones, muscles, and other body tissues. Your body needs three major nutrients for energy. These are carbohydrate, protein, and fat. · Carbohydrate provides energy for your brain, muscles, heart, and lungs. It is found in bread, cereal, rice, pasta, fruits, vegetables, milk, yogurt, and sugar. · Protein provides energy and helps build and repair your body's cells. It is found in meat, poultry, fish, cooked dry beans, cheese, tofu and other soy products, nuts and seeds, and milk and milk products. · Fat provides energy, helps build the covering around nerves in your body, and helps make hormones. Fat is found in butter, margarine, oil, mayonnaise, salad dressing, and nuts. It is also found in most foods that come from animals, such as meat and milk products. Many foods also have fat added to them. Your body needs all three of these nutrients to be healthy. If you choose a good mix of foods, you can help your body get the right amounts of carbohydrate, protein, and fat. It can also keep you at a healthy weight. Follow-up care is a key part of your treatment and safety. Be sure to make and go to all appointments, and call your doctor if you are having problems. It's also a good idea to know your test results and keep a list of the medicines you take. How can you care for yourself at home? Eat a balanced diet  · Try to eat variety of healthy foods every day. These include:  ¨ 5 to 8 ounces of bread, cereal, crackers, rice, or pasta. An ounce is about 1 slice of bread, ¾ cup of breakfast cereal, or ½ cup of cooked rice, cereal, or pasta. Choose whole-grain products for at least half of your grain servings. ¨ 2 to 3 cups of vegetables. Be sure to include:  § Dark green vegetables such as broccoli and spinach. § Orange vegetables such as carrots and sweet potatoes. ¨ 1½ to 2 cups of fresh, frozen, or canned fruit.  A banana, an orange, or a large apple equals 1 cup. ¨ 3 cups of nonfat or low-fat milk, yogurt, or other milk products. ¨ 5 to 6½ ounces of protein foods. These include chicken, fish, lean meat, beans, nuts, and seeds. One egg equals 1 ounce of meat, poultry, or fish. A ½ cup of cooked beans equals 2 ounces of protein. Stay fueled all day  · Start your day with breakfast. If you don't have time to sit down for a bowl of cereal in the morning, try something that you can eat \"on the go. \" Try a piece of whole wheat bread with peanut butter or a container of yogurt with frozen berries mixed in. · Eat regularly scheduled meals and snacks. If you miss a meal, you may overeat at the next meal. Or you may choose a less healthy snack. · Drink enough water. (If you have kidney, heart, or liver disease and have to limit fluids, talk with your doctor before you increase your fluid intake.)  Where can you learn more? Go to http://teodoro-roxi.info/. Enter E054 in the search box to learn more about \"Food as Fuel: Care Instructions. \"  Current as of: July 26, 2016  Content Version: 11.2  © 1571-8711 [employer] Care instructions adapted under license by [employer] (which disclaims liability or warranty for this information). If you have questions about a medical condition or this instruction, always ask your healthcare professional. [place] [address] any warranty or liability for your use of this information.

## 2017-04-14 NOTE — PROGRESS NOTES
Subjective:     Buffy Caban is a 57 y.o. female who presents today with the following:  Chief Complaint   Patient presents with    Depression     refills   Caterina Belt and her  had a bed and breakfast on the [employer]. Buffy Caban presents for follow-up complains of bilateral hand pain MP joints mostly also has trigger finger trigger finger right index injected by orthopedics no significant improvement occasionally bothered by trigger fingers now both sides some swelling occasional mild erythema at the MP joints. No trauma no other significant joint pain. Mother with osteoarthritis no rheumatoid arthritis in the family  Continues to follow at [facility] for pulmonology and endocrinology follow-up with endocrinology  for her diabetes. On continuous oxygen 4 liters when active, walking and sleeping. she feels her respiratory status has improved. Denies any cough congestion shortness of breath no fever chills no exacerbation of her pulmonary complaints. Otherwise doing well no constitutional symptoms             Problem list reviewed as part of this encounter.        ROS:  Gen: denies fever, chills, fatigue, weight loss, weight gain  HEENT:denies blurry vision, nasal congestion, sore throat  Resp: denies dypsnea, cough, wheezing  CV: denies chest pain radiating to the jaws or arms, palpitations, lower extremity edema  Abd: denies nausea, vomiting, diarrhea, constipation  Neuro: denies numbness/tingling  Endo: denies polyuria, polydipsia, heat/cold intolerance  Heme: no lymphadenopathy    Allergies   Allergen Reactions    Cephalosporins Unknown (comments)     Told to avoid by doctor    Codeine Vertigo    Doxycycline Unknown (comments)    Lodine [Etodolac] Hives     Sweats, shakes    Penicillins Hives    Sulfa (Sulfonamide Antibiotics) Rash         Current Outpatient Prescriptions:     mycophenolate (CELLCEPT) 500 mg tablet, Take 1,000 mg by mouth 2 times daily.   Do not chew or crush tablet, Disp: , Rfl:     venlafaxine (EFFEXOR) 100 mg tablet, TAKE ONE TABLET BY MOUTH 2 TIMES A DAY, Disp: 180 Tab, Rfl: 1    omeprazole (PRILOSEC) 40 mg capsule, Take 40 mg by mouth daily. , Disp: 90 Cap, Rfl: 1    simvastatin (ZOCOR) 40 mg tablet, TAKE ONE TABLET BY MOUTH EVERY NIGHT, Disp: 90 Tab, Rfl: 1    ibuprofen (MOTRIN) 800 mg tablet, TAKE ONE TABLET BY MOUTH EVERY 6 HOURS WITH FOOD AS NEEDED  Indications: OSTEOARTHRITIS, Disp: 90 Tab, Rfl: 0    fluticasone (FLONASE) 50 mcg/actuation nasal spray, 2 sprays in each nostril once a day., Disp: 16 g, Rfl: 3    insulin detemir (LEVEMIR FLEXPEN) 100 unit/mL (3 mL) inpn, 40 units sq bid  Indications: DIABETES MELLITUS WITH SEVERE INSULIN RESISTANCE, Disp: 45 mL, Rfl: 10    metFORMIN ER (GLUCOPHAGE XR) 500 mg tablet, Take 1 Tab by mouth two (2) times daily (with meals). , Disp: 60 Tab, Rfl: 10    insulin syringe-needle U-100 (BD INSULIN SYRINGE ULTRA-FINE) 1/2 mL 31 gauge x 15/64\" syrg, 1 Units by Does Not Apply route daily as needed. DX E11.65, Disp: 90 Pen Needle, Rfl: 1 yr    glucose blood VI test strips (ASCENSIA AUTODISC VI, ONE TOUCH ULTRA TEST VI) strip, Use as directed. DX E11.65, Disp: 100 Strip, Rfl: 1 yr    HUMALOG KWIKPEN 100 unit/mL kwikpen, INJECT 4 units SUBCUTANEOUSLY WITH MEALS AS DIRECTED, Disp: 15 mL, Rfl: 0    losartan (COZAAR) 100 mg tablet, Take 1 Tab by mouth daily. , Disp: 90 Tab, Rfl: 2    ALPRAZolam (XANAX) 0.5 mg tablet, Take 1 Tab by mouth nightly as needed for Anxiety or Sleep. Max Daily Amount: 0.5 mg. Indications: ANXIETY, Disp: 45 Tab, Rfl: 0    calcium polycarbophil (FIBER LAXATIVE) 625 mg tablet, Take 625 mg by mouth daily. Started in december, Disp: , Rfl:     calcium-vitamin D (OYSTER SHELL) 500 mg(1,250mg) -200 unit per tablet, Take 1 Tab by mouth two (2) times daily (with meals). , Disp: , Rfl:     meclizine (ANTIVERT) 12.5 mg tablet, Take 12.5 mg by mouth three (3) times daily as needed.  TAKE 1 PILL A DAY PRN, Disp: , Rfl:    timolol maleate 0.5 % DrpD ophthalmic solution, Administer 1 Drop to both eyes daily. , Disp: , Rfl:     latanoprost (XALATAN) 0.005 % ophthalmic solution, Administer 1 Drop to both eyes nightly., Disp: , Rfl:     multivitamin (ONE A DAY) tablet, Take 1 Tab by mouth daily. , Disp: , Rfl:     Omega-3-DHA-EPA-Fish Oil 1,000 (120-180) mg cap, Take  by mouth. TAKE TWICE A DAY, Disp: , Rfl:     aspirin 81 mg tablet, Take 81 mg by mouth., Disp: , Rfl:     dapsone 100 mg tablet, Take 100 mg by mouth daily. , Disp: , Rfl:     Past Medical History:   Diagnosis Date    Colon polyps     Depression     Diverticulosis     Fatty liver 2009    due to steroids    GERD (gastroesophageal reflux disease)     Glaucoma     Interstitial lung disease (HCC)     Menopause     onset at age 49    Mild dysplasia of cervix 1984    Palpitations     Peripheral artery disease ([address]     stent behind left knee.     Pneumonia 2013    hospitalized for 8 days    Pneumonitis 2007    Type 2 diabetes mellitus ([address]     2007    Undiagnosed cardiac murmurs        Past Surgical History:   Procedure Laterality Date    HX CARPAL TUNNEL RELEASE  1/2000    bilateral    HX COLONOSCOPY  1/2006, 2013    polyps present    HX GYN  1/1984    Cervical conization    HX KNEE ARTHROSCOPY  1/1995    HX KNEE ARTHROSCOPY  1/2000    HX ORTHOPAEDIC      bilat. knee surgery    HX ORTHOPAEDIC Right 12/27/2016    A1 pulley release of the right middle finger    HX OTHER SURGICAL  2007    lung biopsy    VASCULAR SURGERY PROCEDURE UNLIST  10/14    left leg clot; s/p stent        History   Smoking Status    Former Smoker    Packs/day: 0.80    Years: 25.00    Types: Cigarettes    Quit date: 10/1/2000   Smokeless Tobacco    Never Used       Social History     Social History    Marital status:      Spouse name: N/A    Number of children: N/A    Years of education: N/A     Social History Main Topics    Smoking status: Former Smoker     Packs/day: 0.80     Years: 25.00     Types: Cigarettes     Quit date: 10/1/2000    Smokeless tobacco: Never Used    Alcohol use 4.2 oz/week     7 Standard drinks or equivalent per week    Drug use: No    Sexual activity: Yes     Partners: Male     Other Topics Concern    None     Social History Narrative    Happily . No concern for abuse. Exercise: none    Diet: Careful. Wear Seatbelts               Family History   Problem Relation Age of Onset    Breast Cancer Mother     Cancer Mother      multiple myeloma    Heart Disease Father     Cancer Maternal Grandmother      multiple myeloma    Osteoporosis Mother     Deep Vein Thrombosis Mother     Hypertension Mother     Cancer Sister      Melanoma         Objective:     Visit Vitals    /66 (BP 1 Location: Left arm, BP Patient Position: Sitting)    Pulse 81    Temp 97.9 °F (36.6 °C) (Temporal)    Resp 20    Ht 5' 9\" (1.753 m)    Wt 194 lb 9.6 oz (88.3 kg)    SpO2 92%    BMI 28.74 kg/m2     Body mass index is 28.74 kg/(m^2). General: Alert and oriented. No acute distress. Well nourished  HEENT :  Ears:TMs are normal. Canals are clear. Eyes: pupils equal, round, react to light and accommodation. Extra ocular movements intact. Nose: patent. Mouth and throat is clear. Neck:supple full range of motion no thyromegaly. Trachea midline, No carotid bruits. No significant lymphadenopathy  Lungs[de-identified] clear to auscultation without wheezes, rales, or rhonchi. Heart :RRR, S1 & S2 are normal intensity. No murmur; no gallop  Abdomen: bowel sounds active. No tenderness, guarding, rebound, masses, hepatic or spleen enlargement  Back: no CVA tenderness. Extremities: without clubbing, cyanosis, or edema  Pulses: radial and femoral pulses are normal  Neuro: HMF intact.  Cranial nerves II through XII grossly normal.  Motor: is 5 over 5 and symmetrical.   Deep tendon reflexes: +2 equal    Results for orders placed or performed in visit on 09/16/16   PAP LB, HPV-H+LR([id number])   Result Value Ref Range    Diagnosis Comment     Specimen adequacy Comment     Clinician provided ICD10 Comment     Performed by: Comment     . Lisa Dowling Note: Comment     HPV, high-risk Negative Negative    HPV, low-risk Negative Negative       No results found for this visit on 04/13/17. Assessment/ Plan:     Nayeli Ren was seen today for depression. Diagnoses and all orders for this visit:    Hypersensitivity pneumonitis ([facility]  -     REFERRAL TO PULMONARY DISEASE    Uncontrolled type 2 diabetes mellitus with hyperglycemia, with long-term current use of insulin ([facility])    Mixed hyperlipidemia    Other orders  -     venlafaxine (EFFEXOR) 100 mg tablet; TAKE ONE TABLET BY MOUTH 2 TIMES A DAY  -     omeprazole (PRILOSEC) 40 mg capsule; Take 40 mg by mouth daily. -     simvastatin (ZOCOR) 40 mg tablet; TAKE ONE TABLET BY MOUTH EVERY NIGHT         1. Hypersensitivity pneumonitis ([facility]    2. Uncontrolled type 2 diabetes mellitus with hyperglycemia, with long-term current use of insulin ([facility]    3. Mixed hyperlipidemia        Orders Placed This Encounter    REFERRAL TO PULMONARY DISEASE     Referral Priority:   Routine     Referral Type:   Consultation     Referral Reason:   Specialty Services Required     Referral Location:   [facility] [address].     Referred to Provider:   Maribel Allison MD    DISCONTD: omeprazole (PRILOSEC) 40 mg capsule     Sig: Take 40 mg by mouth daily.  mycophenolate (CELLCEPT) 500 mg tablet     Sig: Take 1,000 mg by mouth 2 times daily. Do not chew or crush tablet    dapsone 100 mg tablet     Sig: Take 100 mg by mouth daily.  venlafaxine (EFFEXOR) 100 mg tablet     Sig: TAKE ONE TABLET BY MOUTH 2 TIMES A DAY     Dispense:  180 Tab     Refill:  1    omeprazole (PRILOSEC) 40 mg capsule     Sig: Take 40 mg by mouth daily.      Dispense:  90 Cap     Refill:  1    simvastatin (ZOCOR) 40 mg tablet     Sig: TAKE ONE TABLET BY MOUTH EVERY NIGHT Dispense:  90 Tab     Refill:  1     Labs reviewed from [facility]. Verbal and written instructions (see AVS) provided.  Patient expresses understanding of diagnosis and treatment plan.     NENITA GivensC

## 2017-04-20 ENCOUNTER — DOCUMENTATION ONLY (OUTPATIENT)
Dept: FAMILY MEDICINE CLINIC | Age: 62
End: 2017-04-20

## 2017-04-20 NOTE — PROGRESS NOTES
Patients insurance called Nelly Yu [phone] with  with [facility] ,he said no PA needed ref. # [id number] for her appointment with Dr Akhil Owen,pulmonologist Wednesday 7- 11:00 AM.tried to notify DR Glynn Chappell office but had to leave this information on answer machine

## 2017-06-02 ENCOUNTER — CLINICAL SUPPORT (OUTPATIENT)
Dept: FAMILY MEDICINE CLINIC | Age: 62
End: 2017-06-02

## 2017-06-02 DIAGNOSIS — J84.10 POSTINFLAMMATORY PULMONARY FIBROSIS (HCC): ICD-10-CM

## 2017-06-02 DIAGNOSIS — J84.9 INTERSTITIAL LUNG DISEASE (HCC): ICD-10-CM

## 2017-06-02 DIAGNOSIS — Z23 ENCOUNTER FOR IMMUNIZATION: Primary | ICD-10-CM

## 2017-06-02 NOTE — MR AVS SNAPSHOT
Visit Information Date & Time Provider Department Dept. Phone Encounter #  
 6/2/2017  1:00 PM [address] [place] [address] [address] [phone] [id number] Upcoming Health Maintenance Date Due Hepatitis C Screening 1955 MICROALBUMIN Q1 10/14/1964 DTaP/Tdap/Td series (1 - Tdap) 10/14/1975 FOBT Q 1 YEAR AGE 50-75 10/14/2004 ZOSTER VACCINE AGE 60> 10/14/2014 HEMOGLOBIN A1C Q6M 3/8/2016 LIPID PANEL Q1 9/8/2016 EYE EXAM RETINAL OR DILATED Q1 3/24/2017 FOOT EXAM Q1 7/18/2017 INFLUENZA AGE 9 TO ADULT 8/1/2017 BREAST CANCER SCRN MAMMOGRAM 8/17/2018 PAP AKA CERVICAL CYTOLOGY 9/16/2019 Allergies as of 6/2/2017  Review Complete On: 5/24/2017 By: Ivonne Bonilla Severity Noted Reaction Type Reactions Cephalosporins  08/19/2013    Unknown (comments) Told to avoid by doctor Codeine  08/19/2013    Vertigo Doxycycline  08/19/2013    Unknown (comments) Lodine [Etodolac]  11/27/2013    Hives Sweats, shakes Penicillins  08/19/2013    Hives Sulfa (Sulfonamide Antibiotics)  08/19/2013    Rash Current Immunizations  Reviewed on 12/15/2016 Name Date Hep A Vaccine (Adult) 6/2/2017  1:53 PM, 10/11/2016 Hep B Vaccine (Adult) 6/2/2017  1:54 PM, 12/15/2016, 10/11/2016 Influenza High Dose Vaccine PF 10/11/2016 Influenza Vaccine (Quad) 10/14/2015  4:46 PM  
 Pneumococcal Conjugate (PCV-13) 3/16/2016 Pneumococcal Polysaccharide (PPSV-23) 8/1/2013 Not reviewed this visit You Were Diagnosed With   
  
 Codes Comments Encounter for immunization    -  Primary ICD-10-CM: I09 ICD-9-CM: V03.89 Interstitial lung disease ([facility]     ICD-10-CM: J84.9 ICD-9-CM: 928 Postinflammatory pulmonary fibrosis (HCC)     ICD-10-CM: J84.10 ICD-9-CM: 870 Vitals OB Status Smoking Status Postmenopausal Former Smoker Preferred Pharmacy Pharmacy Name Phone [facility], [address] [place] [address] Ishmael Escamilla [phone] Your Updated Medication List  
  
   
This list is accurate as of: 6/2/17  2:33 PM.  Always use your most recent med list.  
  
  
  
  
 ALPRAZolam 0.5 mg tablet Commonly known as:  Norlene Grice Take 1 Tab by mouth nightly as needed for Anxiety or Sleep. Max Daily Amount: 0.5 mg. Indications: ANXIETY  
  
 aspirin 81 mg tablet Take 81 mg by mouth.  
  
 calcium-vitamin D 500 mg(1,250mg) -200 unit per tablet Commonly known as:  OYSTER SHELL Take 1 Tab by mouth two (2) times daily (with meals). dapsone 100 mg tablet Take 100 mg by mouth daily. FIBER LAXATIVE (CA POLYCARBO) 625 mg tablet Generic drug:  calcium polycarbophil Take 625 mg by mouth daily. Started in december  
  
 fluticasone 50 mcg/actuation nasal spray Commonly known as:  FLONASE  
2 sprays in each nostril once a day. glucose blood VI test strips strip Commonly known as:  ASCENSIA AUTODISC VI, ONE TOUCH ULTRA TEST VI  
Use as directed. DX E11.65 HumaLOG KwikPen 100 unit/mL kwikpen Generic drug:  insulin lispro INJECT 4 units SUBCUTANEOUSLY WITH MEALS AS DIRECTED  
  
 ibuprofen 800 mg tablet Commonly known as:  MOTRIN  
TAKE ONE TABLET BY MOUTH EVERY 6 HOURS WITH FOOD AS NEEDED  Indications: OSTEOARTHRITIS  
  
 insulin detemir 100 unit/mL (3 mL) Inpn Commonly known as:  LEVEMIR FLEXPEN  
40 units sq bid  Indications: DIABETES MELLITUS WITH SEVERE INSULIN RESISTANCE  
  
 insulin syringe-needle U-100 1/2 mL 31 gauge x 15/64\" Syrg Commonly known as:  BD INSULIN SYRINGE ULTRA-FINE  
1 Units by Does Not Apply route daily as needed. DX E11.65  
  
 latanoprost 0.005 % ophthalmic solution Commonly known as:  Alisa Cooley Administer 1 Drop to both eyes nightly. losartan 100 mg tablet Commonly known as:  COZAAR Take 1 Tab by mouth daily. meclizine 12.5 mg tablet Commonly known as:  ANTIVERT  
 Take 12.5 mg by mouth three (3) times daily as needed. TAKE 1 PILL A DAY PRN  
  
 metFORMIN  mg tablet Commonly known as:  GLUCOPHAGE XR Take 1 Tab by mouth two (2) times daily (with meals). multivitamin tablet Commonly known as:  ONE A DAY Take 1 Tab by mouth daily. mycophenolate 500 mg tablet Commonly known as:  CELLCEPT Take 1,000 mg by mouth 2 times daily. Do not chew or crush tablet Omega-3-DHA-EPA-Fish Oil 1,000 mg (120 mg-180 mg) Cap Take  by mouth. TAKE TWICE A DAY  
  
 omeprazole 40 mg capsule Commonly known as:  PRILOSEC Take 40 mg by mouth daily. simvastatin 40 mg tablet Commonly known as:  ZOCOR  
TAKE ONE TABLET BY MOUTH EVERY NIGHT  
  
 timolol maleate 0.5 % Drpd ophthalmic solution Administer 1 Drop to both eyes daily. venlafaxine 100 mg tablet Commonly known as:  EFFEXOR  
TAKE ONE TABLET BY MOUTH 2 TIMES A DAY We Performed the Following HEPATITIS A VACCINE, ADULT DOSAGE, IM [21346 CPT(R)] HEPATITIS B VACCINE, ADULT DOSAGE, IM [50861 CPT(R)] NH IMMUNIZ ADMIN,1 SINGLE/COMB VAC/TOXOID [id number] CPT(R)] NH IMMUNIZ ADMIN,1 SINGLE/COMB VAC/TOXOID [id number] CPT(R)] Introducing [facility]! Dear [facility]: Thank you for requesting a [employer] account. Our records indicate that you have previously registered for a [employer] account but its currently inactive. Please call our [employer] support line at [phone]. Additional Information If you have questions, please visit the Frequently Asked Questions section of the [employer] website at [email or web address] Remember, [employer] is NOT to be used for urgent needs. For medical emergencies, dial 911. Now available from your iPhone and Android! Please provide this summary of care documentation to your next provider. Your primary care clinician is listed as Ronan Aparicio.  If you have any questions after today's visit, please call [phone].

## 2017-06-02 NOTE — PROGRESS NOTES
Pt in today for Adult immunization: Heb B & Hep A. Pt voices no complaints at this time. Allergies have been reviewed,and vaccine was given IM. No reaction was noted VIS sheet was given.

## 2017-06-05 ENCOUNTER — TELEPHONE (OUTPATIENT)
Dept: FAMILY MEDICINE CLINIC | Age: 62
End: 2017-06-05

## 2017-06-05 NOTE — TELEPHONE ENCOUNTER
I have advised patient per provider needs to be bring in specimen or come in herself. Stated she is over 60years old and know when she has UTI,just wants antibiotic called in. Advised again per provider needs specimen so we can check in house as well as send for culture to identify germ that causing infection . Patient then stated unable to come in going every 5 minutes and will wet herself if she has to travel anywhere. I have advised her that she could have someone to bring in specimen she did not have any one there with her. Advised I would ask Sue Griffiths again buy could not guarantee med would be called in company policy is need to be tested prior to any medication being called in.

## 2017-06-05 NOTE — TELEPHONE ENCOUNTER
Patient does not want to come in to give a specimen and doesn't understand why we can't just prescribe. She wants to talk to a nurse.

## 2017-07-25 ENCOUNTER — TELEPHONE (OUTPATIENT)
Dept: FAMILY MEDICINE CLINIC | Age: 62
End: 2017-07-25

## 2017-07-25 NOTE — TELEPHONE ENCOUNTER
Patient scheduled for August 1 at 9:00 a.m. with Yessi Collazo.   Patient states she does NOT need an H&P.

## 2017-07-25 NOTE — TELEPHONE ENCOUNTER
Patient has an order from [address] [place] [address] for labs and and EKG for pre-op. Where can we work her in for an [address] because of the EKG? Order in front office basket.

## 2017-08-01 ENCOUNTER — OFFICE VISIT (OUTPATIENT)
Dept: FAMILY MEDICINE CLINIC | Age: 62
End: 2017-08-01

## 2017-08-01 VITALS
OXYGEN SATURATION: 96 % | DIASTOLIC BLOOD PRESSURE: 78 MMHG | HEIGHT: 69 IN | HEART RATE: 82 BPM | TEMPERATURE: 96.4 F | BODY MASS INDEX: 28.5 KG/M2 | RESPIRATION RATE: 22 BRPM | SYSTOLIC BLOOD PRESSURE: 120 MMHG | WEIGHT: 192.4 LBS

## 2017-08-01 DIAGNOSIS — E78.2 MIXED HYPERLIPIDEMIA: ICD-10-CM

## 2017-08-01 DIAGNOSIS — Z01.818 PREOP EXAMINATION: Primary | ICD-10-CM

## 2017-08-01 DIAGNOSIS — I10 ESSENTIAL HYPERTENSION, BENIGN: ICD-10-CM

## 2017-08-01 DIAGNOSIS — Z00.00 HEALTH CARE MAINTENANCE: ICD-10-CM

## 2017-08-01 DIAGNOSIS — Z79.4 UNCONTROLLED TYPE 2 DIABETES MELLITUS WITH HYPERGLYCEMIA, WITH LONG-TERM CURRENT USE OF INSULIN (HCC): ICD-10-CM

## 2017-08-01 DIAGNOSIS — E11.65 UNCONTROLLED TYPE 2 DIABETES MELLITUS WITH HYPERGLYCEMIA, WITH LONG-TERM CURRENT USE OF INSULIN (HCC): ICD-10-CM

## 2017-08-01 NOTE — MR AVS SNAPSHOT
Visit Information Date & Time Provider Department Dept. Phone Encounter #  
 8/1/2017  9:00 AM Nicolette Fortune NP [address] [phone] [id number] Upcoming Health Maintenance Date Due Hepatitis C Screening 1955 MICROALBUMIN Q1 10/14/1964 DTaP/Tdap/Td series (1 - Tdap) 10/14/1975 FOBT Q 1 YEAR AGE 50-75 10/14/2004 HEMOGLOBIN A1C Q6M 3/8/2016 LIPID PANEL Q1 9/8/2016 EYE EXAM RETINAL OR DILATED Q1 3/24/2017 FOOT EXAM Q1 7/18/2017 INFLUENZA AGE 9 TO ADULT 8/1/2017 BREAST CANCER SCRN MAMMOGRAM 8/17/2018 PAP AKA CERVICAL CYTOLOGY 9/16/2019 Allergies as of 8/1/2017  Review Complete On: 8/1/2017 By: Sandra Garcia RN Severity Noted Reaction Type Reactions Cephalosporins  08/19/2013    Unknown (comments) Told to avoid by doctor Codeine  08/19/2013    Vertigo Doxycycline  08/19/2013    Unknown (comments) Lodine [Etodolac]  11/27/2013    Hives Sweats, shakes Penicillins  08/19/2013    Hives Sulfa (Sulfonamide Antibiotics)  08/19/2013    Rash Current Immunizations  Reviewed on 12/15/2016 Name Date Hep A Vaccine (Adult) 6/2/2017  1:53 PM, 10/11/2016 Hep B Vaccine (Adult) 6/2/2017  1:54 PM, 12/15/2016, 10/11/2016 Influenza High Dose Vaccine PF 10/11/2016 Influenza Vaccine (Quad) 10/14/2015  4:46 PM  
 Pneumococcal Conjugate (PCV-13) 3/16/2016 Pneumococcal Polysaccharide (PPSV-23) 8/1/2013 Not reviewed this visit You Were Diagnosed With   
  
 Codes Comments Preop examination    -  Primary ICD-10-CM: E36.384 ICD-9-CM: V72.84 Uncontrolled type 2 diabetes mellitus with hyperglycemia, with long-term current use of insulin (HCC)     ICD-10-CM: E11.65, Z79.4 ICD-9-CM: 250.02, V58.67 Essential hypertension, benign     ICD-10-CM: I10 
ICD-9-CM: 401.1 Mixed hyperlipidemia     ICD-10-CM: E78.2 ICD-9-CM: 272.2 Health care maintenance     ICD-10-CM: Z00.00 ICD-9-CM: V70.0 Vitals BP Pulse Temp Resp Height(growth percentile) 120/78 (BP 1 Location: Left arm, BP Patient Position: Sitting) 82 96.4 °F (35.8 °C) (Temporal) 22 5' 9\" (1.753 m) Weight(growth percentile) SpO2 BMI OB Status Smoking Status 192 lb 6.4 oz (87.3 kg) 96% 28.41 kg/m2 Postmenopausal Former Smoker Vitals History BMI and BSA Data Body Mass Index Body Surface Area  
 28.41 kg/m 2 2.06 m 2 Preferred Pharmacy [facility] Name Phone [id number] [place] [facility] [phone] Your Updated Medication List  
  
   
This list is accurate as of: 8/1/17 10:15 AM.  Always use your most recent med list.  
  
  
  
  
 ALPRAZolam 0.5 mg tablet Commonly known as:  Harleen Michelle Take 1 Tab by mouth nightly as needed for Anxiety or Sleep. Max Daily Amount: 0.5 mg. Indications: ANXIETY  
  
 aspirin 81 mg tablet Take 81 mg by mouth.  
  
 calcium-vitamin D 500 mg(1,250mg) -200 unit per tablet Commonly known as:  OYSTER SHELL Take 1 Tab by mouth two (2) times daily (with meals). dapsone 100 mg tablet Take 100 mg by mouth daily. FIBER LAXATIVE (CA POLYCARBO) 625 mg tablet Generic drug:  calcium polycarbophil Take 625 mg by mouth daily. Started in december  
  
 fluticasone 50 mcg/actuation nasal spray Commonly known as:  FLONASE  
2 sprays in each nostril once a day. glucose blood VI test strips strip Commonly known as:  ASCENSIA AUTODISC VI, ONE TOUCH ULTRA TEST VI  
Use as directed. DX E11.65 HumaLOG KwikPen 100 unit/mL kwikpen Generic drug:  insulin lispro INJECT 4 units SUBCUTANEOUSLY WITH MEALS AS DIRECTED  
  
 ibuprofen 800 mg tablet Commonly known as:  MOTRIN  
TAKE ONE TABLET BY MOUTH EVERY 6 HOURS WITH FOOD AS NEEDED  Indications: OSTEOARTHRITIS  
  
 insulin detemir 100 unit/mL (3 mL) Inpn Commonly known as:  Antonio Sargent  
 40 units sq bid  Indications: DIABETES MELLITUS WITH SEVERE INSULIN RESISTANCE  
  
 insulin syringe-needle U-100 1/2 mL 31 gauge x 15/64\" Syrg Commonly known as:  BD INSULIN SYRINGE ULTRA-FINE  
1 Units by Does Not Apply route daily as needed. DX E11.65  
  
 latanoprost 0.005 % ophthalmic solution Commonly known as:  Shyanne Medeiros Administer 1 Drop to both eyes nightly. losartan 100 mg tablet Commonly known as:  COZAAR Take 1 Tab by mouth daily. meclizine 12.5 mg tablet Commonly known as:  ANTIVERT Take 12.5 mg by mouth three (3) times daily as needed. TAKE 1 PILL A DAY PRN  
  
 metFORMIN  mg tablet Commonly known as:  GLUCOPHAGE XR Take 1 Tab by mouth two (2) times daily (with meals). multivitamin tablet Commonly known as:  ONE A DAY Take 1 Tab by mouth daily. mycophenolate 500 mg tablet Commonly known as:  CELLCEPT Take 1,000 mg by mouth 2 times daily. Do not chew or crush tablet Omega-3-DHA-EPA-Fish Oil 1,000 mg (120 mg-180 mg) Cap Take  by mouth. TAKE TWICE A DAY  
  
 omeprazole 40 mg capsule Commonly known as:  PRILOSEC Take 40 mg by mouth daily. simvastatin 40 mg tablet Commonly known as:  ZOCOR  
TAKE ONE TABLET BY MOUTH EVERY NIGHT  
  
 timolol maleate 0.5 % Drpd ophthalmic solution Administer 1 Drop to both eyes daily. venlafaxine 100 mg tablet Commonly known as:  EFFEXOR  
TAKE ONE TABLET BY MOUTH 2 TIMES A DAY We Performed the Following AMB POC EKG ROUTINE W/ 12 LEADS, INTER & REP [30112 CPT(R)] CBC WITH AUTOMATED DIFF [08862 CPT(R)] COLLECTION VENOUS BLOOD,VENIPUNCTURE [id number] CPT(R)] HEMOGLOBIN A1C WITH EAG [66267 CPT(R)] HEPATITIS C AB [07145 CPT(R)] LIPID PANEL [21526 CPT(R)] METABOLIC PANEL, BASIC [60202 CPT(R)] MICROALBUMIN, UR, RAND W/ MICROALBUMIN/CREA RATIO [id number] CPT(R)] REFERRAL TO CARDIOLOGY [XYC07 Custom] Comments: Finished with cardiology at [facility] needs to establish with a local provider REFERRAL TO ENDOCRINOLOGY [QDS23 Custom] Comments:  
 Diabetes Finished with endocrinologist at [facility] needs to establish with a local provider Referral Information Referral ID Referred By Referred To  
  
 [id number] Julian Randall MD   
   [address] [id number] [facility] Phone: [phone] Fax: [phone] Visits Status Start Date End Date 1 New Request 8/1/17 8/1/18 If your referral has a status of pending review or denied, additional information will be sent to support the outcome of this decision. Referral ID Referred By Referred To  
 [id number] ALEXANDRA [address] [address], [place]  
   [address] [place] [address] Phone: [phone] Fax: [phone] Visits Status Start Date End Date 1 New Request 8/1/17 8/1/18 If your referral has a status of pending review or denied, additional information will be sent to support the outcome of this decision. Introducing [facility] SERVICES! Dear Cuco Randolph: Thank you for requesting a [employer] account. Our records indicate that you have previously registered for a [employer] account but its currently inactive. Please call our [employer] support line at [phone]. Additional Information If you have questions, please visit the Frequently Asked Questions section of the [employer] website at [email or web address] Remember, [employer] is NOT to be used for urgent needs. For medical emergencies, dial 911. Now available from your iPhone and Android! Please provide this summary of care documentation to your next provider. Your primary care clinician is listed as Nicolette Fortune. If you have any questions after today's visit, please call [phone].

## 2017-08-01 NOTE — PROGRESS NOTES
Subjective:     Patricia Wilson is a 57 y.o. female who presents today with the following:  Chief Complaint   Patient presents with    Pre-op Exam     DR KELLER , hand surgery tendon repair      Halima Sullivan presents for preoperative  examination . Scheduled to hand surgery left index and left trigger finger release. With Dr. Lv Jansen on August 9th 2017. Followed by [facility] for cardiology and endocrinology up til this year requesting referral to establish specialist care in the area. COMPLIANT WITH MEDICATION:         ROS:  Gen: denies fever, chills, fatigue, weight loss, weight gain  HEENT:denies blurry vision, nasal congestion, sore throat  Resp: denies dypsnea, cough, wheezing  CV: denies chest pain radiating to the jaws or arms, palpitations, lower extremity edema  Abd: denies nausea, vomiting, diarrhea, constipation  Neuro: denies numbness/tingling  Endo: denies polyuria, polydipsia, heat/cold intolerance  Heme: no lymphadenopathy    Allergies   Allergen Reactions    Cephalosporins Unknown (comments)     Told to avoid by doctor    Codeine Vertigo    Doxycycline Unknown (comments)    Lodine [Etodolac] Hives     Sweats, shakes    Penicillins Hives    Sulfa (Sulfonamide Antibiotics) Rash         Current Outpatient Prescriptions:     mycophenolate (CELLCEPT) 500 mg tablet, Take 1,000 mg by mouth 2 times daily. Do not chew or crush tablet, Disp: , Rfl:     dapsone 100 mg tablet, Take 100 mg by mouth daily. , Disp: , Rfl:     venlafaxine (EFFEXOR) 100 mg tablet, TAKE ONE TABLET BY MOUTH 2 TIMES A DAY, Disp: 180 Tab, Rfl: 1    omeprazole (PRILOSEC) 40 mg capsule, Take 40 mg by mouth daily. , Disp: 90 Cap, Rfl: 1    simvastatin (ZOCOR) 40 mg tablet, TAKE ONE TABLET BY MOUTH EVERY NIGHT, Disp: 90 Tab, Rfl: 1    ibuprofen (MOTRIN) 800 mg tablet, TAKE ONE TABLET BY MOUTH EVERY 6 HOURS WITH FOOD AS NEEDED  Indications: OSTEOARTHRITIS, Disp: 90 Tab, Rfl: 0    fluticasone (FLONASE) 50 mcg/actuation nasal spray, 2 sprays in each nostril once a day., Disp: 16 g, Rfl: 3    insulin detemir (LEVEMIR FLEXPEN) 100 unit/mL (3 mL) inpn, 40 units sq bid  Indications: DIABETES MELLITUS WITH SEVERE INSULIN RESISTANCE, Disp: 45 mL, Rfl: 10    metFORMIN ER (GLUCOPHAGE XR) 500 mg tablet, Take 1 Tab by mouth two (2) times daily (with meals). , Disp: 60 Tab, Rfl: 10    insulin syringe-needle U-100 (BD INSULIN SYRINGE ULTRA-FINE) 1/2 mL 31 gauge x 15/64\" syrg, 1 Units by Does Not Apply route daily as needed. DX E11.65, Disp: 90 Pen Needle, Rfl: 1 yr    glucose blood VI test strips (ASCENSIA AUTODISC VI, ONE TOUCH ULTRA TEST VI) strip, Use as directed. DX E11.65, Disp: 100 Strip, Rfl: 1 yr    HUMALOG KWIKPEN 100 unit/mL kwikpen, INJECT 4 units SUBCUTANEOUSLY WITH MEALS AS DIRECTED, Disp: 15 mL, Rfl: 0    losartan (COZAAR) 100 mg tablet, Take 1 Tab by mouth daily. , Disp: 90 Tab, Rfl: 2    ALPRAZolam (XANAX) 0.5 mg tablet, Take 1 Tab by mouth nightly as needed for Anxiety or Sleep. Max Daily Amount: 0.5 mg. Indications: ANXIETY, Disp: 45 Tab, Rfl: 0    calcium-vitamin D (OYSTER SHELL) 500 mg(1,250mg) -200 unit per tablet, Take 1 Tab by mouth two (2) times daily (with meals). , Disp: , Rfl:     meclizine (ANTIVERT) 12.5 mg tablet, Take 12.5 mg by mouth three (3) times daily as needed. TAKE 1 PILL A DAY PRN, Disp: , Rfl:     timolol maleate 0.5 % DrpD ophthalmic solution, Administer 1 Drop to both eyes daily. , Disp: , Rfl:     latanoprost (XALATAN) 0.005 % ophthalmic solution, Administer 1 Drop to both eyes nightly., Disp: , Rfl:     multivitamin (ONE A DAY) tablet, Take 1 Tab by mouth daily. , Disp: , Rfl:     Omega-3-DHA-EPA-Fish Oil 1,000 (120-180) mg cap, Take  by mouth. TAKE TWICE A DAY, Disp: , Rfl:     aspirin 81 mg tablet, Take 81 mg by mouth., Disp: , Rfl:     calcium polycarbophil (FIBER LAXATIVE) 625 mg tablet, Take 625 mg by mouth daily.  Started in december, Disp: , Rfl:     Past Medical History:   Diagnosis Date    Colon polyps     Depression     Diverticulosis     Fatty liver 2009    due to steroids    GERD (gastroesophageal reflux disease)     Glaucoma     Interstitial lung disease ([address]     Menopause     onset at age 49    Mild dysplasia of cervix 1984    Palpitations     Peripheral artery disease ([facility]     stent behind left knee.  Pneumonia 2013    hospitalized for 8 days    Pneumonitis 2007    Type 2 diabetes mellitus ([facility]     2007    Undiagnosed cardiac murmurs        Past Surgical History:   Procedure Laterality Date    HX CARPAL TUNNEL RELEASE  1/2000    bilateral    HX COLONOSCOPY  1/2006, 2013    polyps present    HX GYN  1/1984    Cervical conization    HX KNEE ARTHROSCOPY  1/1995    HX KNEE ARTHROSCOPY  1/2000    HX ORTHOPAEDIC      bilat. knee surgery    HX ORTHOPAEDIC Right 12/27/2016    A1 pulley release of the right middle finger    HX OTHER SURGICAL  2007    lung biopsy    VASCULAR SURGERY PROCEDURE UNLIST  10/14    left leg clot; s/p stent        History   Smoking Status    Former Smoker    Packs/day: 0.80    Years: 25.00    Types: Cigarettes    Quit date: 10/1/2000   Smokeless Tobacco    Never Used       Social History     Social History    Marital status:      Spouse name: N/A    Number of children: N/A    Years of education: N/A     Social History Main Topics    Smoking status: Former Smoker     Packs/day: 0.80     Years: 25.00     Types: Cigarettes     Quit date: 10/1/2000    Smokeless tobacco: Never Used    Alcohol use 4.2 oz/week     7 Standard drinks or equivalent per week    Drug use: No    Sexual activity: Yes     Partners: Male     Other Topics Concern    None     Social History Narrative    Happily . No concern for abuse. Exercise: none    Diet: Careful.     Wear Seatbelts               Family History   Problem Relation Age of Onset    Breast Cancer Mother     Cancer Mother      multiple myeloma    Osteoporosis Mother     Deep Vein Thrombosis Mother     Hypertension Mother     Heart Disease Father     Cancer Maternal Grandmother      multiple myeloma    Cancer Sister      Melanoma         Objective:     Visit Vitals    /78 (BP 1 Location: Left arm, BP Patient Position: Sitting)    Pulse 82    Temp 96.4 °F (35.8 °C) (Temporal)    Resp 22    Ht 5' 9\" (1.753 m)    Wt 192 lb 6.4 oz (87.3 kg)    SpO2 96%    BMI 28.41 kg/m2     Body mass index is 28.41 kg/(m^2). General: Alert and oriented. No acute distress. Well nourished  HEENT :  Ears:TMs are normal. Canals are clear. Eyes: pupils equal, round, react to light and accommodation. Extra ocular movements intact. Nose: patent. Mouth and throat is clear. Neck:supple full range of motion no thyromegaly. Trachea midline, No carotid bruits. No significant lymphadenopathy  Lungs[de-identified] clear to auscultation without wheezes, rales, or rhonchi. Heart :RRR, S1 & S2 are normal intensity. No murmur; no gallop  Abdomen: bowel sounds active. No tenderness, guarding, rebound, masses, hepatic or spleen enlargement  Back: no CVA tenderness. Extremities: without clubbing, cyanosis, or edema  Pulses: radial and femoral pulses are normal  Neuro: HMF intact. Cranial nerves II through XII grossly normal.  Motor: is 5 over 5 and symmetrical.   Deep tendon reflexes: +2 equal    EKG sinus rhythm done today questionalble  left atrial enlargement asymptomatic  . Will establish care with Dr. Samantha Morley for follow up. Cardiac cath 9/2/2016 normal hemodynamic . Same day 12 lead Normal sinus rhythm. Results for orders placed or performed in visit on 09/16/16   PAP LB, HPV-H+LR([id number])   Result Value Ref Range    Diagnosis Comment     Specimen adequacy Comment     Clinician provided ICD10 Comment     Performed by: Tay Gaitan Note: Comment     HPV, high-risk Negative Negative    HPV, low-risk Negative Negative       No results found for this visit on 08/01/17.     Assessment/ Plan:     Diagnoses and all orders for this visit:    1. Preop examination  -     AMB POC EKG ROUTINE W/ 12 LEADS, INTER & REP  -     CBC WITH AUTOMATED DIFF  -     METABOLIC PANEL, BASIC  -     COLLECTION VENOUS BLOOD,VENIPUNCTURE    2. Uncontrolled type 2 diabetes mellitus with hyperglycemia, with long-term current use of insulin ([facility])  -     CBC WITH AUTOMATED DIFF  -     METABOLIC PANEL, BASIC  -     COLLECTION VENOUS BLOOD,VENIPUNCTURE  -     HEPATITIS C AB  -     MICROALBUMIN, UR, RAND W/ MICROALBUMIN/CREA RATIO  -     HEMOGLOBIN A1C WITH EAG  -     REFERRAL TO ENDOCRINOLOGY    3. Essential hypertension, benign  -     CBC WITH AUTOMATED DIFF  -     METABOLIC PANEL, BASIC  -     COLLECTION VENOUS BLOOD,VENIPUNCTURE  -     REFERRAL TO CARDIOLOGY    4. Mixed hyperlipidemia  -     CBC WITH AUTOMATED DIFF  -     METABOLIC PANEL, BASIC  -     COLLECTION VENOUS BLOOD,VENIPUNCTURE  -     LIPID PANEL  -     REFERRAL TO CARDIOLOGY    5. Health care maintenance  -     HEPATITIS C AB         1. Preop examination    2. Uncontrolled type 2 diabetes mellitus with hyperglycemia, with long-term current use of insulin ([facility]    3. Essential hypertension, benign    4. Mixed hyperlipidemia    5.  Health care maintenance        Orders Placed This Encounter    COLLECTION VENOUS BLOOD,VENIPUNCTURE    CBC WITH AUTOMATED DIFF    METABOLIC PANEL, BASIC    HEPATITIS C AB    MICROALBUMIN, UR, RAND W/ MICROALBUMIN/CREA RATIO    HEMOGLOBIN A1C WITH EAG    LIPID PANEL    REFERRAL TO CARDIOLOGY     Referral Priority:   Routine     Referral Type:   Consultation     Referral Reason:   Specialty Services Required     Referred to Provider:   Amber Flanagan MD    REFERRAL TO ENDOCRINOLOGY     Referral Priority:   Routine     Referral Type:   Consultation     Referral Reason:   Specialty Services Required     Referred to Provider:   Carina Morris MD    AMB POC EKG ROUTINE W/ 12 LEADS, INTER & REP     Order Specific Question:   Reason for Exam:     Answer:   pre-op Cleared for surgery without restrictions   Pending labs. Will addend once labs are back    Verbal and written instructions (see AVS) provided.  Patient expresses understanding of diagnosis and treatment plan.     Kacie Gray, NENITAC

## 2017-08-02 LAB
ALBUMIN/CREAT UR: 10.4 MG/G CREAT (ref 0–30)
BASOPHILS # BLD AUTO: 0 X10E3/UL (ref 0–0.2)
BASOPHILS NFR BLD AUTO: 0 %
BUN SERPL-MCNC: 20 MG/DL (ref 8–27)
BUN/CREAT SERPL: 27 (ref 12–28)
CALCIUM SERPL-MCNC: 10.3 MG/DL (ref 8.7–10.3)
CHLORIDE SERPL-SCNC: 98 MMOL/L (ref 96–106)
CHOLEST SERPL-MCNC: 210 MG/DL (ref 100–199)
CO2 SERPL-SCNC: 25 MMOL/L (ref 18–29)
CREAT SERPL-MCNC: 0.74 MG/DL (ref 0.57–1)
CREAT UR-MCNC: 169.2 MG/DL
EOSINOPHIL # BLD AUTO: 0.2 X10E3/UL (ref 0–0.4)
EOSINOPHIL NFR BLD AUTO: 4 %
ERYTHROCYTE [DISTWIDTH] IN BLOOD BY AUTOMATED COUNT: 13.2 % (ref 12.3–15.4)
EST. AVERAGE GLUCOSE BLD GHB EST-MCNC: 97 MG/DL
GLUCOSE SERPL-MCNC: 153 MG/DL (ref 65–99)
HBA1C MFR BLD: 5 % (ref 4.8–5.6)
HCT VFR BLD AUTO: 37 % (ref 34–46.6)
HCV AB S/CO SERPL IA: <0.1 S/CO RATIO (ref 0–0.9)
HDLC SERPL-MCNC: 66 MG/DL
HGB BLD-MCNC: 11.9 G/DL (ref 11.1–15.9)
IMM GRANULOCYTES # BLD: 0 X10E3/UL (ref 0–0.1)
IMM GRANULOCYTES NFR BLD: 0 %
LDLC SERPL CALC-MCNC: 104 MG/DL (ref 0–99)
LYMPHOCYTES # BLD AUTO: 3.1 X10E3/UL (ref 0.7–3.1)
LYMPHOCYTES NFR BLD AUTO: 50 %
MCH RBC QN AUTO: 34.9 PG (ref 26.6–33)
MCHC RBC AUTO-ENTMCNC: 32.2 G/DL (ref 31.5–35.7)
MCV RBC AUTO: 109 FL (ref 79–97)
MICROALBUMIN UR-MCNC: 17.6 UG/ML
MONOCYTES # BLD AUTO: 0.6 X10E3/UL (ref 0.1–0.9)
MONOCYTES NFR BLD AUTO: 9 %
NEUTROPHILS # BLD AUTO: 2.3 X10E3/UL (ref 1.4–7)
NEUTROPHILS NFR BLD AUTO: 37 %
PLATELET # BLD AUTO: 252 X10E3/UL (ref 150–379)
POTASSIUM SERPL-SCNC: 5.2 MMOL/L (ref 3.5–5.2)
RBC # BLD AUTO: 3.41 X10E6/UL (ref 3.77–5.28)
SODIUM SERPL-SCNC: 140 MMOL/L (ref 134–144)
TRIGL SERPL-MCNC: 200 MG/DL (ref 0–149)
VLDLC SERPL CALC-MCNC: 40 MG/DL (ref 5–40)
WBC # BLD AUTO: 6.3 X10E3/UL (ref 3.4–10.8)

## 2017-08-03 ENCOUNTER — TELEPHONE (OUTPATIENT)
Dept: FAMILY MEDICINE CLINIC | Age: 62
End: 2017-08-03

## 2017-08-03 NOTE — TELEPHONE ENCOUNTER
Asking to speak to nurse Merari Salinas about pt and the fax marilee sent to dr smith office yesterday

## 2017-08-07 ENCOUNTER — TELEPHONE (OUTPATIENT)
Dept: FAMILY MEDICINE CLINIC | Age: 62
End: 2017-08-07

## 2017-08-07 ENCOUNTER — OFFICE VISIT (OUTPATIENT)
Dept: CARDIOLOGY CLINIC | Age: 62
End: 2017-08-07

## 2017-08-07 VITALS
HEIGHT: 69 IN | BODY MASS INDEX: 28.73 KG/M2 | SYSTOLIC BLOOD PRESSURE: 128 MMHG | DIASTOLIC BLOOD PRESSURE: 74 MMHG | OXYGEN SATURATION: 95 % | HEART RATE: 91 BPM | RESPIRATION RATE: 16 BRPM | WEIGHT: 194 LBS

## 2017-08-07 DIAGNOSIS — J84.9 INTERSTITIAL LUNG DISEASE (HCC): ICD-10-CM

## 2017-08-07 DIAGNOSIS — J67.9 PNEUMONITIS, HYPERSENSITIVITY (HCC): ICD-10-CM

## 2017-08-07 DIAGNOSIS — Z98.890 S/P CARDIAC CATH: ICD-10-CM

## 2017-08-07 DIAGNOSIS — E78.2 MIXED HYPERLIPIDEMIA: ICD-10-CM

## 2017-08-07 DIAGNOSIS — I10 ESSENTIAL HYPERTENSION, BENIGN: Primary | ICD-10-CM

## 2017-08-07 DIAGNOSIS — I73.9 PERIPHERAL ARTERY DISEASE (HCC): ICD-10-CM

## 2017-08-07 NOTE — MR AVS SNAPSHOT
Visit Information Date & Time Provider Department Dept. Phone Encounter #  
 8/7/2017  1:00 PM Rose Mary Booker, [address] [id number] Follow-up Instructions Return in about 6 months (around 2/7/2018). Follow-up and Disposition History Your Appointments 9/28/2017  3:30 PM  
ROUTINE CARE with Ania Lim MD  
[facility] Lemond Boeck) Appt Note: f/u diabetes cp0.00  
 [address]  
[facility] [address] [place] [address] [phone] Upcoming Health Maintenance Date Due DTaP/Tdap/Td series (1 - Tdap) 10/14/1975 FOBT Q 1 YEAR AGE 50-75 10/14/2004 EYE EXAM RETINAL OR DILATED Q1 3/24/2017 FOOT EXAM Q1 7/18/2017 INFLUENZA AGE 9 TO ADULT 8/1/2017 HEMOGLOBIN A1C Q6M 2/1/2018 MICROALBUMIN Q1 8/1/2018 LIPID PANEL Q1 8/1/2018 BREAST CANCER SCRN MAMMOGRAM 8/17/2018 PAP AKA CERVICAL CYTOLOGY 9/16/2019 Allergies as of 8/7/2017  Review Complete On: 8/7/2017 By: Rose Mary Booker MD  
  
 Severity Noted Reaction Type Reactions Cephalosporins  08/19/2013    Unknown (comments) Told to avoid by doctor Codeine  08/19/2013    Vertigo Doxycycline  08/19/2013    Unknown (comments) Lodine [Etodolac]  11/27/2013    Hives Sweats, shakes Penicillins  08/19/2013    Hives Sulfa (Sulfonamide Antibiotics)  08/19/2013    Rash Current Immunizations  Reviewed on 12/15/2016 Name Date Hep A Vaccine (Adult) 6/2/2017  1:53 PM, 10/11/2016 Hep B Vaccine (Adult) 6/2/2017  1:54 PM, 12/15/2016, 10/11/2016 Influenza High Dose Vaccine PF 10/11/2016 Influenza Vaccine (Quad) 10/14/2015  4:46 PM  
 Pneumococcal Conjugate (PCV-13) 3/16/2016 Pneumococcal Polysaccharide (PPSV-23) 8/1/2013 Not reviewed this visit You Were Diagnosed With   
  
 Codes Comments Essential hypertension, benign    -  Primary ICD-10-CM: I10 
ICD-9-CM: 401.1 Interstitial lung disease ([facility]     ICD-10-CM: J84.9 ICD-9-CM: 046 Uncontrolled type 2 diabetes mellitus without complication, with long-term current use of insulin (HCC)     ICD-10-CM: E11.65, Z79.4 ICD-9-CM: 250.02, V58.67 Pneumonitis, hypersensitivity ([facility]     ICD-10-CM: J67.9 ICD-9-CM: 495.9 Peripheral artery disease ([facility]     ICD-10-CM: I73.9 ICD-9-CM: 443.9 Mixed hyperlipidemia     ICD-10-CM: E78.2 ICD-9-CM: 272.2 S/P cardiac cath     ICD-10-CM: Z98.890 ICD-9-CM: V45.89 Vitals BP Pulse Resp Height(growth percentile) Weight(growth percentile) SpO2  
 128/74 (BP 1 Location: Right arm, BP Patient Position: Sitting) 91 16 5' 9\" (1.753 m) 194 lb (88 kg) 95% BMI OB Status Smoking Status 28.65 kg/m2 Postmenopausal Former Smoker Vitals History BMI and BSA Data Body Mass Index Body Surface Area  
 28.65 kg/m 2 2.07 m 2 Preferred Pharmacy Pharmacy Name Phone [address] Ishmael Keller Matt [phone] Your Updated Medication List  
  
   
This list is accurate as of: 8/7/17  2:13 PM.  Always use your most recent med list.  
  
  
  
  
 ALPRAZolam 0.5 mg tablet Commonly known as:  Johnice Salts Take 1 Tab by mouth nightly as needed for Anxiety or Sleep. Max Daily Amount: 0.5 mg. Indications: ANXIETY  
  
 aspirin 81 mg tablet Take 81 mg by mouth daily. calcium-vitamin D 500 mg(1,250mg) -200 unit per tablet Commonly known as:  OYSTER SHELL Take 1 Tab by mouth two (2) times daily (with meals). dapsone 100 mg tablet Take 100 mg by mouth daily. fluticasone 50 mcg/actuation nasal spray Commonly known as:  FLONASE  
2 sprays in each nostril once a day. glucose blood VI test strips strip Commonly known as:  ASCENSIA AUTODISC VI, ONE TOUCH ULTRA TEST VI  
Use as directed.  DX E11.65  
  
 HumaLOG KwikPen 100 unit/mL kwikpen Generic drug:  insulin lispro INJECT 4 units SUBCUTANEOUSLY WITH MEALS AS DIRECTED  
  
 ibuprofen 800 mg tablet Commonly known as:  MOTRIN  
TAKE ONE TABLET BY MOUTH EVERY 6 HOURS WITH FOOD AS NEEDED  Indications: OSTEOARTHRITIS  
  
 insulin detemir 100 unit/mL (3 mL) Inpn Commonly known as:  LEVEMIR FLEXPEN  
40 units sq bid  Indications: DIABETES MELLITUS WITH SEVERE INSULIN RESISTANCE  
  
 insulin syringe-needle U-100 1/2 mL 31 gauge x 15/64\" Syrg Commonly known as:  BD INSULIN SYRINGE ULTRA-FINE  
1 Units by Does Not Apply route daily as needed. DX E11.65  
  
 latanoprost 0.005 % ophthalmic solution Commonly known as:  Diogenes Collar Administer 1 Drop to both eyes nightly. losartan 100 mg tablet Commonly known as:  COZAAR Take 1 Tab by mouth daily. meclizine 12.5 mg tablet Commonly known as:  ANTIVERT Take 12.5 mg by mouth three (3) times daily as needed. TAKE 1 PILL A DAY PRN  
  
 metFORMIN  mg tablet Commonly known as:  GLUCOPHAGE XR Take 1 Tab by mouth two (2) times daily (with meals). multivitamin tablet Commonly known as:  ONE A DAY Take 1 Tab by mouth daily. mycophenolate 500 mg tablet Commonly known as:  CELLCEPT Take 1,000 mg by mouth 2 times daily. Do not chew or crush tablet Omega-3-DHA-EPA-Fish Oil 1,000 mg (120 mg-180 mg) Cap Take  by mouth. TAKE TWICE A DAY  
  
 omeprazole 40 mg capsule Commonly known as:  PRILOSEC Take 40 mg by mouth daily. simvastatin 40 mg tablet Commonly known as:  ZOCOR  
TAKE ONE TABLET BY MOUTH EVERY NIGHT  
  
 timolol maleate 0.5 % Drpd ophthalmic solution Administer 1 Drop to both eyes daily. venlafaxine 100 mg tablet Commonly known as:  EFFEXOR  
TAKE ONE TABLET BY MOUTH 2 TIMES A DAY Follow-up Instructions Return in about 6 months (around 2/7/2018). To-Do List   
 Around 08/09/2017 ECHO:  2D ECHO COMPLETE ADULT (TTE) W OR WO CONTR Patient Instructions Get echocardiogram done at [facility]. 
 Dr. Clay Monte office will call you with test results. Follow up in office in 6 months. Introducing [facility]! Dear José Rosenberg: Thank you for requesting a [employer] account. Our records indicate that you have previously registered for a [employer] account but its currently inactive. Please call our [employer] support line at [phone]. Additional Information If you have questions, please visit the Frequently Asked Questions section of the [employer] website at [email or web address] Remember, [employer] is NOT to be used for urgent needs. For medical emergencies, dial 911. Now available from your iPhone and Android! Please provide this summary of care documentation to your next provider. Your primary care clinician is listed as Vaishali Garvey. If you have any questions after today's visit, please call [phone].

## 2017-08-07 NOTE — PROGRESS NOTES
Mauricio Calix is a 57 y.o. female is here to establish local cardiac care. Hx interstitial lung disease with hypersensitivity pneumonitis, ?sarcoid, prior lung bx/VATS, followed by Dr. Ray Garcia at [facility] and also seen previously at [facility] for consideration of lung transplant, although sx have cleared and this is not currently being considered. Had R and L heart cath 9/16 at [facility]--normal coronaries, normal LV function, normal hemodynamics with no evidence of pulmonary hypertension, normal PCW, etc.. Co-morbities with hypertension (ACEI related cough?), DM II, prior DVT, peripheral artery disease s/p popliteal clot/stent, dyslipidemia. Had Echo 7/16 with LVEF 77-51, grade I diastolic dysfunction, normal valves, PASP not estimated. Sees Kaitlynn Urban in [place] as PCP. Has upcoming hand sgy planned (tendon repair). EKG at PCP office one week ago with NSR, LAE, NST. The patient denies chest pain/ shortness of breath, orthopnea, PND, LE edema, palpitations, syncope, presyncope or fatigue. Patient Active Problem List    Diagnosis Date Noted    Mixed hyperlipidemia 07/18/2016    Essential hypertension, benign 07/18/2016    Interstitial lung disease ([address]     DVT (deep venous thrombosis) ([address] 12/29/2014    Vascular device, implant, or graft complication 92/57/0934    Postinflammatory pulmonary fibrosis ([address] 06/24/2014    Personal history of colonic polyps 12/11/2013    Pneumonitis, hypersensitivity ([address] 08/19/2013    Type 2 diabetes mellitus, uncontrolled ([address] 08/19/2013      Lauren Barrientos NP  Past Medical History:   Diagnosis Date    Colon polyps     Depression     Diverticulosis     Fatty liver 2009    due to steroids    GERD (gastroesophageal reflux disease)     Glaucoma     Interstitial lung disease ([address]     Menopause     onset at age 49    Mild dysplasia of cervix 1984    Palpitations     Peripheral artery disease ([address]     stent behind left knee.     Pneumonia 2013 hospitalized for 8 days    Pneumonitis 2007    Type 2 diabetes mellitus ([facility]     2007    Undiagnosed cardiac murmurs       Past Surgical History:   Procedure Laterality Date    HX CARPAL TUNNEL RELEASE  1/2000    bilateral    HX COLONOSCOPY  1/2006, 2013    polyps present    HX GYN  1/1984    Cervical conization    HX HEART CATHETERIZATION  09/2016    UVa-normal coronaries, normal LV function, normal hemodynamics    HX KNEE ARTHROSCOPY  1/1995    HX KNEE ARTHROSCOPY  1/2000    HX ORTHOPAEDIC      bilat. knee surgery    HX ORTHOPAEDIC Right 12/27/2016    A1 pulley release of the right middle finger    HX OTHER SURGICAL  2007    lung biopsy    VASCULAR SURGERY PROCEDURE UNLIST  10/14    left leg clot; s/p stent      Allergies   Allergen Reactions    Cephalosporins Unknown (comments)     Told to avoid by doctor    Codeine Vertigo    Doxycycline Unknown (comments)    Lodine [Etodolac] Hives     Sweats, shakes    Penicillins Hives    Sulfa (Sulfonamide Antibiotics) Rash      Family History   Problem Relation Age of Onset    Breast Cancer Mother     Cancer Mother      multiple myeloma    Osteoporosis Mother     Deep Vein Thrombosis Mother     Hypertension Mother     Heart Disease Father     Cancer Maternal Grandmother      multiple myeloma    Cancer Sister      Melanoma      Social History     Social History    Marital status:      Spouse name: N/A    Number of children: N/A    Years of education: N/A     Occupational History    Not on file. Social History Main Topics    Smoking status: Former Smoker     Packs/day: 0.80     Years: 25.00     Types: Cigarettes     Quit date: 10/1/2000    Smokeless tobacco: Never Used    Alcohol use 4.2 oz/week     7 Standard drinks or equivalent per week    Drug use: No    Sexual activity: Yes     Partners: Male     Other Topics Concern    Not on file     Social History Narrative    Happily . No concern for abuse.     Exercise: none Diet: Careful. Wear Seatbelts              Current Outpatient Prescriptions   Medication Sig    mycophenolate (CELLCEPT) 500 mg tablet Take 1,000 mg by mouth 2 times daily. Do not chew or crush tablet    dapsone 100 mg tablet Take 100 mg by mouth daily.  venlafaxine (EFFEXOR) 100 mg tablet TAKE ONE TABLET BY MOUTH 2 TIMES A DAY    omeprazole (PRILOSEC) 40 mg capsule Take 40 mg by mouth daily.  simvastatin (ZOCOR) 40 mg tablet TAKE ONE TABLET BY MOUTH EVERY NIGHT    ibuprofen (MOTRIN) 800 mg tablet TAKE ONE TABLET BY MOUTH EVERY 6 HOURS WITH FOOD AS NEEDED  Indications: OSTEOARTHRITIS    fluticasone (FLONASE) 50 mcg/actuation nasal spray 2 sprays in each nostril once a day.  insulin detemir (LEVEMIR FLEXPEN) 100 unit/mL (3 mL) inpn 40 units sq bid  Indications: DIABETES MELLITUS WITH SEVERE INSULIN RESISTANCE    metFORMIN ER (GLUCOPHAGE XR) 500 mg tablet Take 1 Tab by mouth two (2) times daily (with meals). (Patient taking differently: Take 1,000 mg by mouth two (2) times daily (with meals). )    insulin syringe-needle U-100 (BD INSULIN SYRINGE ULTRA-FINE) 1/2 mL 31 gauge x 15/64\" syrg 1 Units by Does Not Apply route daily as needed. DX E11.65    glucose blood VI test strips (ASCENSIA AUTODISC VI, ONE TOUCH ULTRA TEST VI) strip Use as directed. DX E11.65    HUMALOG KWIKPEN 100 unit/mL kwikpen INJECT 4 units SUBCUTANEOUSLY WITH MEALS AS DIRECTED    losartan (COZAAR) 100 mg tablet Take 1 Tab by mouth daily.  ALPRAZolam (XANAX) 0.5 mg tablet Take 1 Tab by mouth nightly as needed for Anxiety or Sleep. Max Daily Amount: 0.5 mg. Indications: ANXIETY    calcium-vitamin D (OYSTER SHELL) 500 mg(1,250mg) -200 unit per tablet Take 1 Tab by mouth two (2) times daily (with meals).  meclizine (ANTIVERT) 12.5 mg tablet Take 12.5 mg by mouth three (3) times daily as needed. TAKE 1 PILL A DAY PRN    timolol maleate 0.5 % DrpD ophthalmic solution Administer 1 Drop to both eyes daily.     latanoprost (XALATAN) 0.005 % ophthalmic solution Administer 1 Drop to both eyes nightly.  multivitamin (ONE A DAY) tablet Take 1 Tab by mouth daily.  Omega-3-DHA-EPA-Fish Oil 1,000 (120-180) mg cap Take  by mouth. TAKE TWICE A DAY    aspirin 81 mg tablet Take 81 mg by mouth daily. No current facility-administered medications for this visit. Review of Symptoms:    CONST  No weight change. No fever, chills, sweats    ENT No visual changes, URI sx, sore throat    CV  See HPI   RESP  No cough, or sputum, wheezing. Also see HPI   GI  No abdominal pain or change in bowel habits. No heartburn or dysphagia. No melena or rectal bleeding.   No dysuria, urgency, frequency, hematuria   MSKEL  No joint pain, swelling. No muscle pain. SKIN  No rash or lesions. NEURO  No headache, syncope, or seizure. No weakness, loss of sensation, or paresthesias. PSYCH  No low mood or depression  No anxiety. HE/LYMPH  No easy bruising, abnormal bleeding, or enlarged glands.         Physical ExamPhysical Exam:    Visit Vitals    /74 (BP 1 Location: Right arm, BP Patient Position: Sitting)    Pulse 91    Resp 16    Ht 5' 9\" (1.753 m)    Wt 194 lb (88 kg)    SpO2 95%    BMI 28.65 kg/m2     Gen: NAD  HEENT:  PERRL, throat clear  Neck: no adenopathy, no thyromegaly, no JVD   Heart:  Regular,Nl S1S2,  no murmur, gallop or rub.   Lungs:  clear  Abdomen:   Soft, non-tender, bowel sounds are active.   Extremities:  No edema  Pulse: symmetric  Neuro: A&O times 3, No focal neuro deficits    Cardiographics    ECG: NSR, LAE, NST      Labs:   Lab Results   Component Value Date/Time    Sodium 140 08/01/2017 10:13 AM    Sodium 136 09/08/2015 12:11 PM    Sodium 141 12/29/2014 07:38 AM    Sodium 138 07/29/2014 09:04 AM    Sodium 140 07/09/2014 08:18 AM    Potassium 5.2 08/01/2017 10:13 AM    Potassium 5.3 09/08/2015 12:11 PM    Potassium 4.4 12/29/2014 07:38 AM    Potassium 4.6 07/29/2014 09:04 AM    Potassium 4.5 07/09/2014 08:18 AM    Chloride 98 08/01/2017 10:13 AM    Chloride 93 09/08/2015 12:11 PM    Chloride 99 12/29/2014 07:38 AM    Chloride 101 07/29/2014 09:04 AM    Chloride 100 07/09/2014 08:18 AM    CO2 25 08/01/2017 10:13 AM    CO2 16 09/08/2015 12:11 PM    CO2 22 12/29/2014 07:38 AM    CO2 20 07/29/2014 09:04 AM    CO2 22 07/09/2014 08:18 AM    Glucose 153 08/01/2017 10:13 AM    Glucose 274 09/08/2015 12:11 PM    Glucose 178 12/29/2014 07:38 AM    Glucose 338 07/29/2014 09:04 AM    Glucose 183 07/09/2014 08:18 AM    BUN 20 08/01/2017 10:13 AM    BUN 20 09/08/2015 12:11 PM    BUN 13 12/29/2014 07:38 AM    BUN 10 07/29/2014 09:04 AM    BUN 13 07/09/2014 08:18 AM    Creatinine 0.74 08/01/2017 10:13 AM    Creatinine 0.83 09/08/2015 12:11 PM    Creatinine 0.57 12/29/2014 07:38 AM    Creatinine 0.66 07/29/2014 09:04 AM    Creatinine 0.65 07/09/2014 08:18 AM    BUN/Creatinine ratio 27 08/01/2017 10:13 AM    BUN/Creatinine ratio 24 09/08/2015 12:11 PM    BUN/Creatinine ratio 23 12/29/2014 07:38 AM    BUN/Creatinine ratio 15 07/29/2014 09:04 AM    BUN/Creatinine ratio 20 07/09/2014 08:18 AM    GFR est  08/01/2017 10:13 AM    GFR est AA 89 09/08/2015 12:11 PM    GFR est  12/29/2014 07:38 AM    GFR est  07/29/2014 09:04 AM    GFR est  07/09/2014 08:18 AM    GFR est non-AA 87 08/01/2017 10:13 AM    GFR est non-AA 77 09/08/2015 12:11 PM    GFR est non- 12/29/2014 07:38 AM    GFR est non-AA 97 07/29/2014 09:04 AM    GFR est non-AA 97 07/09/2014 08:18 AM    Calcium 10.3 08/01/2017 10:13 AM    Calcium 10.7 09/08/2015 12:11 PM    Calcium 8.8 12/29/2014 07:38 AM    Calcium 9.7 07/29/2014 09:04 AM    Calcium 9.6 07/09/2014 08:18 AM    Bilirubin, total 0.3 12/29/2014 07:38 AM    Bilirubin, total 0.7 07/29/2014 09:04 AM    Bilirubin, total 0.6 07/09/2014 08:18 AM    Bilirubin, total 0.5 06/24/2014 08:07 AM    Bilirubin, total 0.6 06/10/2014 08:42 AM    AST (SGOT) 83 09/08/2015 12:11 PM    AST (SGOT) 76 12/29/2014 07:38 AM    AST (SGOT) 51 07/29/2014 09:04 AM    AST (SGOT) 65 07/09/2014 08:18 AM    AST (SGOT) 56 06/24/2014 08:07 AM    Alk. phosphatase 43 12/29/2014 07:38 AM    Alk. phosphatase 54 07/29/2014 09:04 AM    Alk. phosphatase 44 07/09/2014 08:18 AM    Alk. phosphatase 47 06/24/2014 08:07 AM    Alk.  phosphatase 42 06/10/2014 08:42 AM    Protein, total 6.3 12/29/2014 07:38 AM    Protein, total 6.6 07/29/2014 09:04 AM    Protein, total 6.9 07/09/2014 08:18 AM    Protein, total 6.8 06/24/2014 08:07 AM    Protein, total 6.7 06/10/2014 08:42 AM    Albumin 4.3 12/29/2014 07:38 AM    Albumin 4.5 07/29/2014 09:04 AM    Albumin 4.5 07/09/2014 08:18 AM    Albumin 4.7 06/24/2014 08:07 AM    Albumin 4.6 06/10/2014 08:42 AM    A-G Ratio 2.2 12/29/2014 07:38 AM    A-G Ratio 2.1 07/29/2014 09:04 AM    A-G Ratio 1.9 07/09/2014 08:18 AM    A-G Ratio 2.2 06/24/2014 08:07 AM    A-G Ratio 2.2 06/10/2014 08:42 AM    ALT (SGPT) 68 12/29/2014 07:38 AM    ALT (SGPT) 59 09/15/2014 08:22 AM    ALT (SGPT) 48 07/29/2014 09:04 AM    ALT (SGPT) 64 07/09/2014 08:18 AM    ALT (SGPT) 56 06/24/2014 08:07 AM     No results found for: CPK, CPKX, CPX  Lab Results   Component Value Date/Time    Cholesterol, total 210 08/01/2017 10:13 AM    Cholesterol, total 210 09/08/2015 12:11 PM    Cholesterol, total 188 12/29/2014 07:38 AM    Cholesterol, total 303 09/15/2014 08:22 AM    Cholesterol, total 208 12/31/2013 09:53 AM    HDL Cholesterol 66 08/01/2017 10:13 AM    HDL Cholesterol 51 09/08/2015 12:11 PM    HDL Cholesterol 52 12/29/2014 07:38 AM    HDL Cholesterol 59 09/15/2014 08:22 AM    HDL Cholesterol 60 12/31/2013 09:53 AM    LDL, calculated 104 08/01/2017 10:13 AM    LDL, calculated 83 09/08/2015 12:11 PM    LDL, calculated 104 12/29/2014 07:38 AM    LDL, calculated 204 09/15/2014 08:22 AM    LDL, calculated 111 12/31/2013 09:53 AM    Triglyceride 200 08/01/2017 10:13 AM    Triglyceride 382 09/08/2015 12:11 PM    Triglyceride 159 12/29/2014 07:38 AM    Triglyceride 199 09/15/2014 08:22 AM    Triglyceride 185 12/31/2013 09:53 AM     No results found for this or any previous visit. Assessment:         Patient Active Problem List    Diagnosis Date Noted    Mixed hyperlipidemia 07/18/2016    Essential hypertension, benign 07/18/2016    Interstitial lung disease ([address]     DVT (deep venous thrombosis) ([address] 12/29/2014    Vascular device, implant, or graft complication 62/29/2520    Postinflammatory pulmonary fibrosis ([address] 06/24/2014    Personal history of colonic polyps 12/11/2013    Pneumonitis, hypersensitivity ([address] 08/19/2013    Type 2 diabetes mellitus, uncontrolled ([address] 08/19/2013      Hx interstitial lung disease with hypersensitivity pneumonitis, ?sarcoid, prior lung bx/VATS, followed by Dr. Kirsten Duong at [address] [place] [address] and also seen previously at [facility] for consideration of lung transplant, although sx have cleared and this is not currently being considered. Had R and L heart cath 9/16 at [facility]--normal coronaries, normal LV function, normal hemodynamics with no evidence of pulmonary hypertension, normal PCW, etc.. Co-morbities with hypertension (ACEI related cough?), DM II, prior DVT, peripheral artery disease s/p popliteal clot/stent, dyslipidemia. Had Echo 7/16 with LVEF 09-99, grade I diastolic dysfunction, normal valves, PASP not estimated. Sees Gorge Sanchez in [place] as PCP. Has upcoming hand sgy planned (tendon repair).   EKG at PCP office one week ago with NSR, LAE, NST     Plan:     Continue current meds  Repeat Echo/doppler--previous Echo one year ago, LAE on EKG  Sx stable--no indication for repeat ischemic w/u  Will call w/ results of Echo  CLEAR for planned hand sgy at low cardiac risk  F/u with PCP as planned  F/u with Pulmonary as planned  F/u here 6 mos, sooner demetria Estrella MD

## 2017-08-07 NOTE — PATIENT INSTRUCTIONS
Get echocardiogram done at [facility].   Dr. Nallely Eli office will call you with test results. Follow up in office in 6 months.

## 2017-08-07 NOTE — PROGRESS NOTES
PATIENT ID VERIFIED WITH TWO PATIENT IDENTIFIERS. MEDICATION REVIEWED AND APPROVED BY DR. Mary Chapin.

## 2017-08-07 NOTE — TELEPHONE ENCOUNTER
Patient called to inform me that she will see an in-network endocrinologist DR Simi Epstein phone [phone],FWB the notes,labs and referral faxed to [phone]

## 2017-08-07 NOTE — PROGRESS NOTES
Patricia Wilson is a 57 y.o. female is here to establish local cardiac care. Hx interstitial lung disease with hypersensitivity pneumonitis, ?sarcoid, prior lung bx/VATS, followed by Dr. Leonela Hanley at [facility] and also seen previously at [facility] for consideration of lung transplant, although sx have cleared and this is not currently being considered. Had R and L heart cath 9/16 at [facility]--normal coronaries, normal LV function, normal hemodynamics with no evidence of pulmonary hypertension, normal PCW, etc.. Co-morbities with hypertension (ACEI related cough?), DM II, prior DVT, peripheral artery disease s/p popliteal clot/stent, dyslipidemia. Had Echo 7/16 with LVEF 01-78, grade I diastolic dysfunction, normal valves, PASP not estimated. Sees Kacie Gray in [place] as PCP. Has upcoming hand sgy planned (tendon repair). EKG at PCP office one week ago with NSR, LAE, NST. The patient denies chest pain/ shortness of breath, orthopnea, PND, LE edema, palpitations, syncope, presyncope or fatigue. Patient Active Problem List    Diagnosis Date Noted    Mixed hyperlipidemia 07/18/2016    Essential hypertension, benign 07/18/2016    Interstitial lung disease ([address]     DVT (deep venous thrombosis) ([address] 12/29/2014    Vascular device, implant, or graft complication 79/61/3704    Postinflammatory pulmonary fibrosis ([address] 06/24/2014    Personal history of colonic polyps 12/11/2013    Pneumonitis, hypersensitivity ([address] 08/19/2013    Type 2 diabetes mellitus, uncontrolled ([address] 08/19/2013      Andie Baker NP  Past Medical History:   Diagnosis Date    Colon polyps     Depression     Diverticulosis     Fatty liver 2009    due to steroids    GERD (gastroesophageal reflux disease)     Glaucoma     Interstitial lung disease ([address]     Menopause     onset at age 49    Mild dysplasia of cervix 1984    Palpitations     Peripheral artery disease ([address]     stent behind left knee.     Pneumonia 2013 hospitalized for 8 days    Pneumonitis 2007    Type 2 diabetes mellitus [facility]     2007    Undiagnosed cardiac murmurs       Past Surgical History:   Procedure Laterality Date    HX CARPAL TUNNEL RELEASE  1/2000    bilateral    HX COLONOSCOPY  1/2006, 2013    polyps present    HX GYN  1/1984    Cervical conization    HX KNEE ARTHROSCOPY  1/1995    HX KNEE ARTHROSCOPY  1/2000    HX ORTHOPAEDIC      bilat. knee surgery    HX ORTHOPAEDIC Right 12/27/2016    A1 pulley release of the right middle finger    HX OTHER SURGICAL  2007    lung biopsy    VASCULAR SURGERY PROCEDURE UNLIST  10/14    left leg clot; s/p stent      Allergies   Allergen Reactions    Cephalosporins Unknown (comments)     Told to avoid by doctor    Codeine Vertigo    Doxycycline Unknown (comments)    Lodine [Etodolac] Hives     Sweats, shakes    Penicillins Hives    Sulfa (Sulfonamide Antibiotics) Rash      Family History   Problem Relation Age of Onset    Breast Cancer Mother     Cancer Mother      multiple myeloma    Osteoporosis Mother     Deep Vein Thrombosis Mother     Hypertension Mother     Heart Disease Father     Cancer Maternal Grandmother      multiple myeloma    Cancer Sister      Melanoma      Social History     Social History    Marital status:      Spouse name: N/A    Number of children: N/A    Years of education: N/A     Occupational History    Not on file. Social History Main Topics    Smoking status: Former Smoker     Packs/day: 0.80     Years: 25.00     Types: Cigarettes     Quit date: 10/1/2000    Smokeless tobacco: Never Used    Alcohol use 4.2 oz/week     7 Standard drinks or equivalent per week    Drug use: No    Sexual activity: Yes     Partners: Male     Other Topics Concern    Not on file     Social History Narrative    Happily . No concern for abuse. Exercise: none    Diet: Careful.     Wear Seatbelts              Current Outpatient Prescriptions   Medication Sig    mycophenolate (CELLCEPT) 500 mg tablet Take 1,000 mg by mouth 2 times daily. Do not chew or crush tablet    dapsone 100 mg tablet Take 100 mg by mouth daily.  venlafaxine (EFFEXOR) 100 mg tablet TAKE ONE TABLET BY MOUTH 2 TIMES A DAY    omeprazole (PRILOSEC) 40 mg capsule Take 40 mg by mouth daily.  simvastatin (ZOCOR) 40 mg tablet TAKE ONE TABLET BY MOUTH EVERY NIGHT    ibuprofen (MOTRIN) 800 mg tablet TAKE ONE TABLET BY MOUTH EVERY 6 HOURS WITH FOOD AS NEEDED  Indications: OSTEOARTHRITIS    fluticasone (FLONASE) 50 mcg/actuation nasal spray 2 sprays in each nostril once a day.  insulin detemir (LEVEMIR FLEXPEN) 100 unit/mL (3 mL) inpn 40 units sq bid  Indications: DIABETES MELLITUS WITH SEVERE INSULIN RESISTANCE    metFORMIN ER (GLUCOPHAGE XR) 500 mg tablet Take 1 Tab by mouth two (2) times daily (with meals).  insulin syringe-needle U-100 (BD INSULIN SYRINGE ULTRA-FINE) 1/2 mL 31 gauge x 15/64\" syrg 1 Units by Does Not Apply route daily as needed. DX E11.65    glucose blood VI test strips (ASCENSIA AUTODISC VI, ONE TOUCH ULTRA TEST VI) strip Use as directed. DX E11.65    HUMALOG KWIKPEN 100 unit/mL kwikpen INJECT 4 units SUBCUTANEOUSLY WITH MEALS AS DIRECTED    losartan (COZAAR) 100 mg tablet Take 1 Tab by mouth daily.  ALPRAZolam (XANAX) 0.5 mg tablet Take 1 Tab by mouth nightly as needed for Anxiety or Sleep. Max Daily Amount: 0.5 mg. Indications: ANXIETY    calcium polycarbophil (FIBER LAXATIVE) 625 mg tablet Take 625 mg by mouth daily. Started in december    calcium-vitamin D (OYSTER SHELL) 500 mg(1,250mg) -200 unit per tablet Take 1 Tab by mouth two (2) times daily (with meals).  meclizine (ANTIVERT) 12.5 mg tablet Take 12.5 mg by mouth three (3) times daily as needed. TAKE 1 PILL A DAY PRN    timolol maleate 0.5 % DrpD ophthalmic solution Administer 1 Drop to both eyes daily.  latanoprost (XALATAN) 0.005 % ophthalmic solution Administer 1 Drop to both eyes nightly.     multivitamin (ONE A DAY) tablet Take 1 Tab by mouth daily.  Omega-3-DHA-EPA-Fish Oil 1,000 (120-180) mg cap Take  by mouth. TAKE TWICE A DAY    aspirin 81 mg tablet Take 81 mg by mouth. No current facility-administered medications for this visit. Review of Symptoms:    CONST  No weight change. No fever, chills, sweats    ENT No visual changes, URI sx, sore throat    CV  See HPI   RESP  No cough, or sputum, wheezing. Also see HPI   GI  No abdominal pain or change in bowel habits. No heartburn or dysphagia. No melena or rectal bleeding.   No dysuria, urgency, frequency, hematuria   MSKEL  No joint pain, swelling. No muscle pain. SKIN  No rash or lesions. NEURO  No headache, syncope, or seizure. No weakness, loss of sensation, or paresthesias. PSYCH  No low mood or depression  No anxiety. HE/LYMPH  No easy bruising, abnormal bleeding, or enlarged glands. Physical ExamPhysical Exam:    There were no vitals taken for this visit.   Gen: NAD  HEENT:  PERRL, throat clear  Neck: no adenopathy, no thyromegaly, no JVD   Heart:  Regular,Nl S1S2,  no murmur, gallop or rub.   Lungs:  clear  Abdomen:   Soft, non-tender, bowel sounds are active.   Extremities:  No edema  Pulse: symmetric  Neuro: A&O times 3, No focal neuro deficits    Cardiographics    ECG: NSR, possible LAE, NST (from last week)        Labs:   Lab Results   Component Value Date/Time    Sodium 140 08/01/2017 10:13 AM    Sodium 136 09/08/2015 12:11 PM    Sodium 141 12/29/2014 07:38 AM    Sodium 138 07/29/2014 09:04 AM    Sodium 140 07/09/2014 08:18 AM    Potassium 5.2 08/01/2017 10:13 AM    Potassium 5.3 09/08/2015 12:11 PM    Potassium 4.4 12/29/2014 07:38 AM    Potassium 4.6 07/29/2014 09:04 AM    Potassium 4.5 07/09/2014 08:18 AM    Chloride 98 08/01/2017 10:13 AM    Chloride 93 09/08/2015 12:11 PM    Chloride 99 12/29/2014 07:38 AM    Chloride 101 07/29/2014 09:04 AM    Chloride 100 07/09/2014 08:18 AM    CO2 25 08/01/2017 10:13 AM    CO2 16 09/08/2015 12:11 PM    CO2 22 12/29/2014 07:38 AM    CO2 20 07/29/2014 09:04 AM    CO2 22 07/09/2014 08:18 AM    Glucose 153 08/01/2017 10:13 AM    Glucose 274 09/08/2015 12:11 PM    Glucose 178 12/29/2014 07:38 AM    Glucose 338 07/29/2014 09:04 AM    Glucose 183 07/09/2014 08:18 AM    BUN 20 08/01/2017 10:13 AM    BUN 20 09/08/2015 12:11 PM    BUN 13 12/29/2014 07:38 AM    BUN 10 07/29/2014 09:04 AM    BUN 13 07/09/2014 08:18 AM    Creatinine 0.74 08/01/2017 10:13 AM    Creatinine 0.83 09/08/2015 12:11 PM    Creatinine 0.57 12/29/2014 07:38 AM    Creatinine 0.66 07/29/2014 09:04 AM    Creatinine 0.65 07/09/2014 08:18 AM    BUN/Creatinine ratio 27 08/01/2017 10:13 AM    BUN/Creatinine ratio 24 09/08/2015 12:11 PM    BUN/Creatinine ratio 23 12/29/2014 07:38 AM    BUN/Creatinine ratio 15 07/29/2014 09:04 AM    BUN/Creatinine ratio 20 07/09/2014 08:18 AM    GFR est  08/01/2017 10:13 AM    GFR est AA 89 09/08/2015 12:11 PM    GFR est  12/29/2014 07:38 AM    GFR est  07/29/2014 09:04 AM    GFR est  07/09/2014 08:18 AM    GFR est non-AA 87 08/01/2017 10:13 AM    GFR est non-AA 77 09/08/2015 12:11 PM    GFR est non- 12/29/2014 07:38 AM    GFR est non-AA 97 07/29/2014 09:04 AM    GFR est non-AA 97 07/09/2014 08:18 AM    Calcium 10.3 08/01/2017 10:13 AM    Calcium 10.7 09/08/2015 12:11 PM    Calcium 8.8 12/29/2014 07:38 AM    Calcium 9.7 07/29/2014 09:04 AM    Calcium 9.6 07/09/2014 08:18 AM    Bilirubin, total 0.3 12/29/2014 07:38 AM    Bilirubin, total 0.7 07/29/2014 09:04 AM    Bilirubin, total 0.6 07/09/2014 08:18 AM    Bilirubin, total 0.5 06/24/2014 08:07 AM    Bilirubin, total 0.6 06/10/2014 08:42 AM    AST (SGOT) 83 09/08/2015 12:11 PM    AST (SGOT) 76 12/29/2014 07:38 AM    AST (SGOT) 51 07/29/2014 09:04 AM    AST (SGOT) 65 07/09/2014 08:18 AM    AST (SGOT) 56 06/24/2014 08:07 AM    Alk. phosphatase 43 12/29/2014 07:38 AM    Alk.  phosphatase 54 07/29/2014 09:04 AM    Alk. phosphatase 44 07/09/2014 08:18 AM    Alk. phosphatase 47 06/24/2014 08:07 AM    Alk. phosphatase 42 06/10/2014 08:42 AM    Protein, total 6.3 12/29/2014 07:38 AM    Protein, total 6.6 07/29/2014 09:04 AM    Protein, total 6.9 07/09/2014 08:18 AM    Protein, total 6.8 06/24/2014 08:07 AM    Protein, total 6.7 06/10/2014 08:42 AM    Albumin 4.3 12/29/2014 07:38 AM    Albumin 4.5 07/29/2014 09:04 AM    Albumin 4.5 07/09/2014 08:18 AM    Albumin 4.7 06/24/2014 08:07 AM    Albumin 4.6 06/10/2014 08:42 AM    A-G Ratio 2.2 12/29/2014 07:38 AM    A-G Ratio 2.1 07/29/2014 09:04 AM    A-G Ratio 1.9 07/09/2014 08:18 AM    A-G Ratio 2.2 06/24/2014 08:07 AM    A-G Ratio 2.2 06/10/2014 08:42 AM    ALT (SGPT) 68 12/29/2014 07:38 AM    ALT (SGPT) 59 09/15/2014 08:22 AM    ALT (SGPT) 48 07/29/2014 09:04 AM    ALT (SGPT) 64 07/09/2014 08:18 AM    ALT (SGPT) 56 06/24/2014 08:07 AM     No results found for: CPK, CPKX, CPX  Lab Results   Component Value Date/Time    Cholesterol, total 210 08/01/2017 10:13 AM    Cholesterol, total 210 09/08/2015 12:11 PM    Cholesterol, total 188 12/29/2014 07:38 AM    Cholesterol, total 303 09/15/2014 08:22 AM    Cholesterol, total 208 12/31/2013 09:53 AM    HDL Cholesterol 66 08/01/2017 10:13 AM    HDL Cholesterol 51 09/08/2015 12:11 PM    HDL Cholesterol 52 12/29/2014 07:38 AM    HDL Cholesterol 59 09/15/2014 08:22 AM    HDL Cholesterol 60 12/31/2013 09:53 AM    LDL, calculated 104 08/01/2017 10:13 AM    LDL, calculated 83 09/08/2015 12:11 PM    LDL, calculated 104 12/29/2014 07:38 AM    LDL, calculated 204 09/15/2014 08:22 AM    LDL, calculated 111 12/31/2013 09:53 AM    Triglyceride 200 08/01/2017 10:13 AM    Triglyceride 382 09/08/2015 12:11 PM    Triglyceride 159 12/29/2014 07:38 AM    Triglyceride 199 09/15/2014 08:22 AM    Triglyceride 185 12/31/2013 09:53 AM     No results found for this or any previous visit.     Assessment:         Patient Active Problem List    Diagnosis Date Noted    Mixed hyperlipidemia 07/18/2016    Essential hypertension, benign 07/18/2016    Interstitial lung disease ([facility]     DVT (deep venous thrombosis) ([facility] 12/29/2014    Vascular device, implant, or graft complication 45/42/3544    Postinflammatory pulmonary fibrosis ([facility] 06/24/2014    Personal history of colonic polyps 12/11/2013    Pneumonitis, hypersensitivity ([facility] 08/19/2013    Type 2 diabetes mellitus, uncontrolled ([facility] 08/19/2013      Hx interstitial lung disease with hypersensitivity pneumonitis, ?sarcoid, prior lung bx/VATS, followed by Dr. Vane Ellison at [facility] and also seen previously at [facility] for consideration of lung transplant, although sx have cleared and this is not currently being considered. Had R and L heart cath 9/16 at [facility]--normal coronaries, normal LV function, normal hemodynamics with no evidence of pulmonary hypertension, normal PCW, etc.. Co-morbities with hypertension (ACEI related cough?-now on ARB), DM II, prior DVT, peripheral artery disease s/p popliteal clot/stent, dyslipidemia. Had Echo 7/16 with LVEF 85-02, grade I diastolic dysfunction, normal valves, PASP not estimated. Sees Amina Gudino in [place] as PCP. Has upcoming hand sgy planned (tendon repair).   EKG at PCP office one week ago with NSR, LAE, NST     Plan:     Repeat Echo (last was 7/16)  Continue current rx  Lipids/DM/labs per PCP  OK for planned hand sgy  F/u 6 mos, sooner demetria Camarena MD

## 2017-08-08 NOTE — PROGRESS NOTES
CBC no anemia  Metabolic panel: Good liver and kidney function, glucose down to 153 from 274  Hep C screen negative  Standing ovation HGBA1C 5.0 awesome down from 11.6  Lipid panel great improvement with the cholesterol.

## 2017-08-11 ENCOUNTER — OFFICE VISIT (OUTPATIENT)
Dept: FAMILY MEDICINE CLINIC | Age: 62
End: 2017-08-11

## 2017-08-11 VITALS
HEIGHT: 69 IN | RESPIRATION RATE: 20 BRPM | TEMPERATURE: 98.2 F | SYSTOLIC BLOOD PRESSURE: 122 MMHG | OXYGEN SATURATION: 97 % | BODY MASS INDEX: 28.44 KG/M2 | WEIGHT: 192 LBS | DIASTOLIC BLOOD PRESSURE: 72 MMHG | HEART RATE: 84 BPM

## 2017-08-11 DIAGNOSIS — J02.9 VIRAL PHARYNGITIS: ICD-10-CM

## 2017-08-11 DIAGNOSIS — J03.90 TONSILLITIS: ICD-10-CM

## 2017-08-11 DIAGNOSIS — R49.0 HOARSE: Primary | ICD-10-CM

## 2017-08-11 LAB
S PYO AG THROAT QL: NEGATIVE
VALID INTERNAL CONTROL?: YES

## 2017-08-11 RX ORDER — DEXAMETHASONE SODIUM PHOSPHATE 10 MG/ML
4 INJECTION INTRAMUSCULAR; INTRAVENOUS ONCE
Qty: 1 ML | Refills: 0
Start: 2017-08-11 | End: 2017-08-11

## 2017-08-11 NOTE — MR AVS SNAPSHOT
Visit Information Date & Time Provider Department Dept. Phone Encounter #  
 8/11/2017 11:30 AM Conrado Manuel NP [facility] [address] [facility] [phone] [id number] Your Appointments 9/28/2017  3:30 PM  
ROUTINE CARE with Stefano Fleischer, MD [facility]) Appt Note: f/u diabetes cp0.00  
 [address] [address]  
[address] [facility]  
  
    
 2/16/2018  2:40 PM  
ESTABLISHED PATIENT with Chinmay Luna MD  
[address] [place] [facility]) Appt Note: 6 mo f/u  
 [address] [facility] [address] [id number] [phone]  
  
   
 [address] [place] [id number] Upcoming Health Maintenance Date Due DTaP/Tdap/Td series (1 - Tdap) 10/14/1975 FOBT Q 1 YEAR AGE 50-75 10/14/2004 EYE EXAM RETINAL OR DILATED Q1 3/24/2017 FOOT EXAM Q1 7/18/2017 INFLUENZA AGE 9 TO ADULT 8/1/2017 HEMOGLOBIN A1C Q6M 2/1/2018 MICROALBUMIN Q1 8/1/2018 LIPID PANEL Q1 8/1/2018 BREAST CANCER SCRN MAMMOGRAM 8/17/2018 PAP AKA CERVICAL CYTOLOGY 9/16/2019 Allergies as of 8/11/2017  Review Complete On: 8/11/2017 By: Roz Hall RN Severity Noted Reaction Type Reactions Cephalosporins  08/19/2013    Unknown (comments) Told to avoid by doctor Codeine  08/19/2013    Vertigo Doxycycline  08/19/2013    Unknown (comments) Lodine [Etodolac]  11/27/2013    Hives Sweats, shakes Penicillins  08/19/2013    Hives Sulfa (Sulfonamide Antibiotics)  08/19/2013    Rash Current Immunizations  Reviewed on 12/15/2016 Name Date Hep A Vaccine (Adult) 6/2/2017  1:53 PM, 10/11/2016 Hep B Vaccine (Adult) 6/2/2017  1:54 PM, 12/15/2016, 10/11/2016 Influenza High Dose Vaccine PF 10/11/2016  Influenza Vaccine (Quad) 10/14/2015  4:46 PM  
 Pneumococcal Conjugate (PCV-13) 3/16/2016 Pneumococcal Polysaccharide (PPSV-23) 8/1/2013 Not reviewed this visit You Were Diagnosed With   
  
 Codes Comments Hoarse    -  Primary ICD-10-CM: R49.0 ICD-9-CM: 784.42 Viral pharyngitis     ICD-10-CM: J02.9 ICD-9-CM: 945 Tonsillitis     ICD-10-CM: J03.90 ICD-9-CM: 655 Vitals BP Pulse Temp Resp Height(growth percentile) 122/72 (BP 1 Location: Left arm, BP Patient Position: Sitting) 84 98.2 °F (36.8 °C) (Temporal) 20 5' 9\" (1.753 m) Weight(growth percentile) SpO2 BMI OB Status Smoking Status 192 lb (87.1 kg) 97% 28.35 kg/m2 Postmenopausal Former Smoker BMI and BSA Data Body Mass Index Body Surface Area  
 28.35 kg/m 2 2.06 m 2 Preferred Pharmacy Pharmacy Name Phone [address] [facility] [place] [facility] [phone] Your Updated Medication List  
  
   
This list is accurate as of: 8/11/17 12:06 PM.  Always use your most recent med list.  
  
  
  
  
 ALPRAZolam 0.5 mg tablet Commonly known as:  Juventino Grange Take 1 Tab by mouth nightly as needed for Anxiety or Sleep. Max Daily Amount: 0.5 mg. Indications: ANXIETY  
  
 aspirin 81 mg tablet Take 81 mg by mouth daily. calcium-vitamin D 500 mg(1,250mg) -200 unit per tablet Commonly known as:  OYSTER SHELL Take 1 Tab by mouth two (2) times daily (with meals). dapsone 100 mg tablet Take 100 mg by mouth daily. dexamethasone (PF) 10 mg/mL injection Commonly known as:  DECADRON  
0.4 mL by IntraMUSCular route once for 1 dose. fluticasone 50 mcg/actuation nasal spray Commonly known as:  FLONASE  
2 sprays in each nostril once a day. glucose blood VI test strips strip Commonly known as:  ASCENSIA AUTODISC VI, ONE TOUCH ULTRA TEST VI  
Use as directed. DX E11.65 HumaLOG KwikPen 100 unit/mL kwikpen Generic drug:  insulin lispro INJECT 4 units SUBCUTANEOUSLY WITH MEALS AS DIRECTED  
  
 ibuprofen 800 mg tablet Commonly known as:  MOTRIN  
TAKE ONE TABLET BY MOUTH EVERY 6 HOURS WITH FOOD AS NEEDED  Indications: OSTEOARTHRITIS  
  
 insulin detemir 100 unit/mL (3 mL) Inpn Commonly known as:  LEVEMIR FLEXPEN  
40 units sq bid  Indications: DIABETES MELLITUS WITH SEVERE INSULIN RESISTANCE  
  
 insulin syringe-needle U-100 1/2 mL 31 gauge x 15/64\" Syrg Commonly known as:  BD INSULIN SYRINGE ULTRA-FINE  
1 Units by Does Not Apply route daily as needed. DX E11.65  
  
 latanoprost 0.005 % ophthalmic solution Commonly known as:  Vickey Roch Administer 1 Drop to both eyes nightly. losartan 100 mg tablet Commonly known as:  COZAAR Take 1 Tab by mouth daily. meclizine 12.5 mg tablet Commonly known as:  ANTIVERT Take 12.5 mg by mouth three (3) times daily as needed. TAKE 1 PILL A DAY PRN  
  
 metFORMIN  mg tablet Commonly known as:  GLUCOPHAGE XR Take 1 Tab by mouth two (2) times daily (with meals). multivitamin tablet Commonly known as:  ONE A DAY Take 1 Tab by mouth daily. mycophenolate 500 mg tablet Commonly known as:  CELLCEPT Take 1,000 mg by mouth 2 times daily. Do not chew or crush tablet Omega-3-DHA-EPA-Fish Oil 1,000 mg (120 mg-180 mg) Cap Take  by mouth. TAKE TWICE A DAY  
  
 omeprazole 40 mg capsule Commonly known as:  PRILOSEC Take 40 mg by mouth daily. simvastatin 40 mg tablet Commonly known as:  ZOCOR  
TAKE ONE TABLET BY MOUTH EVERY NIGHT  
  
 timolol maleate 0.5 % Drpd ophthalmic solution Administer 1 Drop to both eyes daily. venlafaxine 100 mg tablet Commonly known as:  EFFEXOR  
TAKE ONE TABLET BY MOUTH 2 TIMES A DAY We Performed the Following AMB POC RAPID STREP A [87677 CPT(R)] DEXAMETHASONE SODIUM PHOSPHATE INJECTION 1 MG [ [facility]] [place] THER/PROPH/DIAG INJECTION, SUBCUT/IM [id number] CPT(R)] To-Do List   
 08/11/2017 3:00 PM  
  Appoint[facility] with [address] [place] [facility] ([phone]) GENERAL INSTRUCTIONS - If you have a hand-written prescription for this exam, you must bring it with you on the day of your exam. - Bring all relevant prior images from facilities outside of [facility]. Failure to provide images may delay reading by Radiologist. - Children under the age of 15 may not be left unattended. - [address] [place] [address] patients must have an armband and be accompanied by a caregiver or family member. APPOINTMENT CANCELLATION - To reschedule or cancel an appointment, please contact the Scheduling Department at [phone]. Introducing [facility]! Dear Cuco Randolph: Thank you for requesting a [employer] account. Our records indicate that you have previously registered for a [employer] account but its currently inactive. Please call our [employer] support line at [phone]. Additional Information If you have questions, please visit the Frequently Asked Questions section of the [employer] website at [email or web address] Remember, [employer] is NOT to be used for urgent needs. For medical emergencies, dial 911. Now available from your iPhone and Android! Please provide this summary of care documentation to your next provider. Your primary care clinician is listed as Nicolette Fortune. If you have any questions after today's visit, please call [phone].

## 2017-08-12 NOTE — PROGRESS NOTES
Subjective:     Anthony Strickland is a 57 y.o. female who presents today with the following:  Chief Complaint   Patient presents with    Hoarse     dry throat     Francoise Sarah presents  For an  acute visitwith sore throat x 1 day. Looked down her throat in front a mirror and saw enlarged uvula and tonsils. Concerned about strep she came to the office        ROS:  Gen: denies fever, chills, fatigue, weight loss, weight gain  HEENT:denies blurry vision, nasal congestion,positive  sore throat  Resp: denies dypsnea, cough, wheezing  CV: denies chest pain radiating to the jaws or arms, palpitations, lower extremity edema  Abd: denies nausea, vomiting, diarrhea, constipation  Neuro: denies numbness/tingling  Endo: denies polyuria, polydipsia, heat/cold intolerance  Heme: no lymphadenopathy    Allergies   Allergen Reactions    Cephalosporins Unknown (comments)     Told to avoid by doctor    Codeine Vertigo    Doxycycline Unknown (comments)    Lodine [Etodolac] Hives     Sweats, shakes    Penicillins Hives    Sulfa (Sulfonamide Antibiotics) Rash         Current Outpatient Prescriptions:     dexamethasone, PF, (DECADRON) 10 mg/mL injection, 0.4 mL by IntraMUSCular route once for 1 dose., Disp: 1 mL, Rfl: 0    mycophenolate (CELLCEPT) 500 mg tablet, Take 1,000 mg by mouth 2 times daily. Do not chew or crush tablet, Disp: , Rfl:     dapsone 100 mg tablet, Take 100 mg by mouth daily. , Disp: , Rfl:     venlafaxine (EFFEXOR) 100 mg tablet, TAKE ONE TABLET BY MOUTH 2 TIMES A DAY, Disp: 180 Tab, Rfl: 1    omeprazole (PRILOSEC) 40 mg capsule, Take 40 mg by mouth daily. , Disp: 90 Cap, Rfl: 1    simvastatin (ZOCOR) 40 mg tablet, TAKE ONE TABLET BY MOUTH EVERY NIGHT, Disp: 90 Tab, Rfl: 1    ibuprofen (MOTRIN) 800 mg tablet, TAKE ONE TABLET BY MOUTH EVERY 6 HOURS WITH FOOD AS NEEDED  Indications: OSTEOARTHRITIS, Disp: 90 Tab, Rfl: 0    fluticasone (FLONASE) 50 mcg/actuation nasal spray, 2 sprays in each nostril once a day., Disp: 16 g, Rfl: 3    insulin detemir (LEVEMIR FLEXPEN) 100 unit/mL (3 mL) inpn, 40 units sq bid  Indications: DIABETES MELLITUS WITH SEVERE INSULIN RESISTANCE, Disp: 45 mL, Rfl: 10    metFORMIN ER (GLUCOPHAGE XR) 500 mg tablet, Take 1 Tab by mouth two (2) times daily (with meals). (Patient taking differently: Take 1,000 mg by mouth two (2) times daily (with meals). ), Disp: 60 Tab, Rfl: 10    insulin syringe-needle U-100 (BD INSULIN SYRINGE ULTRA-FINE) 1/2 mL 31 gauge x 15/64\" syrg, 1 Units by Does Not Apply route daily as needed. DX E11.65, Disp: 90 Pen Needle, Rfl: 1 yr    glucose blood VI test strips (ASCENSIA AUTODISC VI, ONE TOUCH ULTRA TEST VI) strip, Use as directed. DX E11.65, Disp: 100 Strip, Rfl: 1 yr    HUMALOG KWIKPEN 100 unit/mL kwikpen, INJECT 4 units SUBCUTANEOUSLY WITH MEALS AS DIRECTED, Disp: 15 mL, Rfl: 0    losartan (COZAAR) 100 mg tablet, Take 1 Tab by mouth daily. , Disp: 90 Tab, Rfl: 2    ALPRAZolam (XANAX) 0.5 mg tablet, Take 1 Tab by mouth nightly as needed for Anxiety or Sleep. Max Daily Amount: 0.5 mg. Indications: ANXIETY, Disp: 45 Tab, Rfl: 0    calcium-vitamin D (OYSTER SHELL) 500 mg(1,250mg) -200 unit per tablet, Take 1 Tab by mouth two (2) times daily (with meals). , Disp: , Rfl:     meclizine (ANTIVERT) 12.5 mg tablet, Take 12.5 mg by mouth three (3) times daily as needed. TAKE 1 PILL A DAY PRN, Disp: , Rfl:     timolol maleate 0.5 % DrpD ophthalmic solution, Administer 1 Drop to both eyes daily. , Disp: , Rfl:     latanoprost (XALATAN) 0.005 % ophthalmic solution, Administer 1 Drop to both eyes nightly., Disp: , Rfl:     multivitamin (ONE A DAY) tablet, Take 1 Tab by mouth daily. , Disp: , Rfl:     Omega-3-DHA-EPA-Fish Oil 1,000 (120-180) mg cap, Take  by mouth. TAKE TWICE A DAY, Disp: , Rfl:     aspirin 81 mg tablet, Take 81 mg by mouth daily. , Disp: , Rfl:     Past Medical History:   Diagnosis Date    Colon polyps     Depression     Diverticulosis     Fatty liver 2009    due to steroids    GERD (gastroesophageal reflux disease)     Glaucoma     Interstitial lung disease ([address]     Menopause     onset at age 49    Mild dysplasia of cervix 1984    Palpitations     Peripheral artery disease ([address]     stent behind left knee.  Pneumonia 2013    hospitalized for 8 days    Pneumonitis 2007    Type 2 diabetes mellitus ([address]     2007    Undiagnosed cardiac murmurs        Past Surgical History:   Procedure Laterality Date    HX CARPAL TUNNEL RELEASE  1/2000    bilateral    HX COLONOSCOPY  1/2006, 2013    polyps present    HX GYN  1/1984    Cervical conization    HX HEART CATHETERIZATION  09/2016    UVa-normal coronaries, normal LV function, normal hemodynamics    HX KNEE ARTHROSCOPY  1/1995    HX KNEE ARTHROSCOPY  1/2000    HX ORTHOPAEDIC      bilat. knee surgery    HX ORTHOPAEDIC Right 12/27/2016    A1 pulley release of the right middle finger    HX OTHER SURGICAL  2007    lung biopsy    VASCULAR SURGERY PROCEDURE UNLIST  10/14    left leg clot; s/p stent        History   Smoking Status    Former Smoker    Packs/day: 0.80    Years: 25.00    Types: Cigarettes    Quit date: 10/1/2000   Smokeless Tobacco    Never Used       Social History     Social History    Marital status:      Spouse name: N/A    Number of children: N/A    Years of education: N/A     Social History Main Topics    Smoking status: Former Smoker     Packs/day: 0.80     Years: 25.00     Types: Cigarettes     Quit date: 10/1/2000    Smokeless tobacco: Never Used    Alcohol use 4.2 oz/week     7 Standard drinks or equivalent per week    Drug use: No    Sexual activity: Yes     Partners: Male     Other Topics Concern    None     Social History Narrative    Happily . No concern for abuse. Exercise: none    Diet: Careful.     Wear Seatbelts               Family History   Problem Relation Age of Onset    Breast Cancer Mother     Cancer Mother      multiple myeloma  Osteoporosis Mother     Deep Vein Thrombosis Mother     Hypertension Mother     Heart Disease Father     Cancer Maternal Grandmother      multiple myeloma    Cancer Sister      Melanoma         Objective:     Visit Vitals    /72 (BP 1 Location: Left arm, BP Patient Position: Sitting)    Pulse 84    Temp 98.2 °F (36.8 °C) (Temporal)    Resp 20    Ht 5' 9\" (1.753 m)    Wt 192 lb (87.1 kg)    SpO2 97%    BMI 28.35 kg/m2     Body mass index is 28.35 kg/(m^2). General: Alert and oriented. No acute distress. Well nourished  HEENT :  Ears:TMs are normal. Canals are clear. Eyes: pupils equal, round, react to light and accommodation. Extra ocular movements intact. Nose: patent. Mouth and throat erythematous, 2 to 3 + tonsils, no exudate or cervical lymphadenopathy. Neck:supple full range of motion no thyromegaly. Trachea midline, No carotid bruits. No significant lymphadenopathy  Lungs[de-identified] clear to auscultation without wheezes, rales, or rhonchi. Heart :RRR, S1 & S2 are normal intensity. No murmur; no gallop  Abdomen: bowel sounds active. No tenderness, guarding, rebound, masses, hepatic or spleen enlargement  Back: no CVA tenderness. Extremities: without clubbing, cyanosis, or edema  Pulses: radial and femoral pulses are normal  Neuro: HMF intact. Cranial nerves II through XII grossly normal.  Motor: is 5 over 5 and symmetrical.   Deep tendon reflexes: +2 equal    Results for orders placed or performed in visit on 08/11/17   AMB POC RAPID STREP A   Result Value Ref Range    VALID INTERNAL CONTROL POC Yes     Group A Strep Ag Negative Negative       Results for orders placed or performed in visit on 08/11/17   AMB POC RAPID STREP A   Result Value Ref Range    VALID INTERNAL CONTROL POC Yes     Group A Strep Ag Negative Negative       Assessment/ Plan:     Diagnoses and all orders for this visit:    1.  Hoarse  -     AMB POC RAPID STREP A  -     DEXAMETHASONE SODIUM PHOSPHATE INJECTION 1 MG (Qty 10 for 10 mg)  -     THER/PROPH/DIAG INJECTION, SUBCUT/IM    2. Viral pharyngitis  -     DEXAMETHASONE SODIUM PHOSPHATE INJECTION 1 MG (Qty 10 for 10 mg)  -     THER/PROPH/DIAG INJECTION, SUBCUT/IM    3. Tonsillitis  -     DEXAMETHASONE SODIUM PHOSPHATE INJECTION 1 MG (Qty 10 for 10 mg)  -     THER/PROPH/DIAG INJECTION, SUBCUT/IM    Other orders  -     dexamethasone, PF, (DECADRON) 10 mg/mL injection; 0.4 mL by IntraMUSCular route once for 1 dose. 1. Hoarse    2. Viral pharyngitis    3. Tonsillitis        Orders Placed This Encounter    AMB POC RAPID STREP A    DEXAMETHASONE SODIUM PHOSPHATE INJECTION 1 MG (Qty 10 for 10 mg)     Order Specific Question:   Dose     Answer:   4 mg     Order Specific Question:   Site     Answer:   RIGHT GLUTEUS     Order Specific Question:   Expiration Date     Answer:   2018     Order Specific Question:   Lot#     Answer:   [id number]     Order Specific Question:        Answer:   [email or web address]     Order Specific Question:   Charge Quantity? Answer:   1     Order Specific Question:   Perfomed by/Witnessed by: Answer:   Jb Tapia rn     Order Specific Question:   NDC#     Answer:   23859-313-92    THER/PROPH/DIAG INJECTION, SUBCUT/IM    dexamethasone, PF, (DECADRON) 10 mg/mL injection     Si.4 mL by IntraMUSCular route once for 1 dose. Dispense:  1 mL     Refill:  0     RTO if symptoms persist or worsen. Verbal and written instructions (see AVS) provided.  Patient expresses understanding of diagnosis and treatment plan.     ISAURO Woodard

## 2017-08-16 ENCOUNTER — TELEPHONE (OUTPATIENT)
Dept: CARDIOLOGY CLINIC | Age: 62
End: 2017-08-16

## 2017-08-16 NOTE — TELEPHONE ENCOUNTER
Pt notified (after 2 identifiers) per Dr Mayte Peterson echo looks normal--pumping function, normal valves, etc\". Pt acknowledged understanding and had no further questions.

## 2017-08-17 RX ORDER — MYCOPHENOLATE MOFETIL 500 MG/1
TABLET ORAL
Qty: 120 TAB | Refills: 5 | Status: SHIPPED | OUTPATIENT
Start: 2017-08-17 | End: 2017-12-13 | Stop reason: SDUPTHER

## 2017-08-17 NOTE — TELEPHONE ENCOUNTER
Patient needs a refill on mycophenolate 500 mg 2 tabs twice daily. It had been prescribed while at [facility] and she was told her PCP had to refill it. Slade.

## 2017-08-21 ENCOUNTER — TELEPHONE (OUTPATIENT)
Dept: FAMILY MEDICINE CLINIC | Age: 62
End: 2017-08-21

## 2017-08-21 NOTE — TELEPHONE ENCOUNTER
Went to [facility] to  Mycophenolate on Friday and pharmacy said they did not have RX. Please call in. She needs this today. It say it went trough but they did not have it.

## 2017-08-28 ENCOUNTER — OFFICE VISIT (OUTPATIENT)
Dept: FAMILY MEDICINE CLINIC | Age: 62
End: 2017-08-28

## 2017-08-28 VITALS
WEIGHT: 193.6 LBS | OXYGEN SATURATION: 95 % | DIASTOLIC BLOOD PRESSURE: 60 MMHG | BODY MASS INDEX: 28.68 KG/M2 | SYSTOLIC BLOOD PRESSURE: 120 MMHG | RESPIRATION RATE: 16 BRPM | TEMPERATURE: 98.1 F | HEIGHT: 69 IN | HEART RATE: 99 BPM

## 2017-08-28 DIAGNOSIS — R19.5 LOOSE STOOLS: ICD-10-CM

## 2017-08-28 DIAGNOSIS — J39.2 DRY THROAT: ICD-10-CM

## 2017-08-28 DIAGNOSIS — J84.9 INTERSTITIAL LUNG DISEASE (HCC): Primary | ICD-10-CM

## 2017-08-28 LAB
HEMOCCULT STL QL: NEGATIVE
VALID INTERNAL CONTROL?: YES

## 2017-08-28 RX ORDER — AMOXICILLIN 500 MG/1
500 CAPSULE ORAL ONCE
Qty: 1 CAP | Refills: 0 | Status: SHIPPED | OUTPATIENT
Start: 2017-08-28 | End: 2017-08-28

## 2017-08-28 NOTE — MR AVS SNAPSHOT
Visit Information Date & Time Provider Department Dept. Phone Encounter #  
 8/28/2017  1:00 PM Gypsy Sampson NP [facility] [address] [facility] [phone] [id number] Your Appointments 9/28/2017  3:30 PM  
ROUTINE CARE with MD [facility] [address]) Appt Note: f/u diabetes cp0.00  
 [address] [facility] [address]  
[address] [facility] [place] [facility]  
  
    
 2/16/2018  2:40 PM  
ESTABLISHED PATIENT with Heavenly Quiroz MD  
[address] [facility] [address]) Appt Note: 6 mo f/u  
 [address] [facility] [phone]  
  
   
 [address] [address] Upcoming Health Maintenance Date Due DTaP/Tdap/Td series (1 - Tdap) 10/14/1975 FOBT Q 1 YEAR AGE 50-75 10/14/2004 EYE EXAM RETINAL OR DILATED Q1 3/24/2017 FOOT EXAM Q1 7/18/2017 INFLUENZA AGE 9 TO ADULT 8/1/2017 HEMOGLOBIN A1C Q6M 2/1/2018 MICROALBUMIN Q1 8/1/2018 LIPID PANEL Q1 8/1/2018 BREAST CANCER SCRN MAMMOGRAM 8/17/2018 PAP AKA CERVICAL CYTOLOGY 9/16/2019 Allergies as of 8/28/2017  Review Complete On: 8/28/2017 By: Gypsy Sampson NP Severity Noted Reaction Type Reactions Cephalosporins  08/19/2013    Unknown (comments) Told to avoid by doctor Codeine  08/19/2013    Vertigo Doxycycline  08/19/2013    Unknown (comments) Lodine [Etodolac]  11/27/2013    Hives Sweats, shakes Penicillins  08/19/2013    Hives Sulfa (Sulfonamide Antibiotics)  08/19/2013    Rash Current Immunizations  Reviewed on 12/15/2016 Name Date Hep A Vaccine (Adult) 6/2/2017  1:53 PM, 10/11/2016 Hep B Vaccine (Adult) 6/2/2017  1:54 PM, 12/15/2016, 10/11/2016 Influenza High Dose Vaccine PF 10/11/2016  Influenza Vaccine 10/14/2015 12:00 AM, 10/5/2015 12:00 AM  
 Influenza Vaccine (Quad) 10/14/2015  4:46 PM  
 Pneumococcal Conjugate (PCV-13) 3/16/2016 12:00 AM  
 Pneumococcal Polysaccharide (PPSV-23) 8/1/2013 Zoster Vaccine, Live 10/14/2014 12:00 AM  
  
 Not reviewed this visit You Were Diagnosed With   
  
 Codes Comments Interstitial lung disease ([facility]    -  Primary ICD-10-CM: J84.9 ICD-9-CM: 067 Vitals BP Pulse Temp Resp Height(growth percentile) Weight(growth percentile) 120/60 99 98.1 °F (36.7 °C) (Oral) 16 5' 9\" (1.753 m) 193 lb 9.6 oz (87.8 kg) SpO2 BMI OB Status Smoking Status 95% 28.59 kg/m2 Postmenopausal Former Smoker BMI and BSA Data Body Mass Index Body Surface Area 28.59 kg/m 2 2.07 m 2 Preferred Pharmacy Pharmacy Name Phone [address], [address] [facility] [phone] Your Updated Medication List  
  
   
This list is accurate as of: 8/28/17  2:11 PM.  Always use your most recent med list.  
  
  
  
  
 ALPRAZolam 0.5 mg tablet Commonly known as:  Jean Flack Take 1 Tab by mouth nightly as needed for Anxiety or Sleep. Max Daily Amount: 0.5 mg. Indications: ANXIETY  
  
 amoxicillin 500 mg capsule Commonly known as:  AMOXIL Take 1 Cap by mouth once for 1 dose. aspirin 81 mg tablet Take 81 mg by mouth daily. calcium-vitamin D 500 mg(1,250mg) -200 unit per tablet Commonly known as:  OYSTER SHELL Take 1 Tab by mouth two (2) times daily (with meals). dapsone 100 mg tablet Take 100 mg by mouth daily. fluticasone 50 mcg/actuation nasal spray Commonly known as:  FLONASE  
2 sprays in each nostril once a day. glucose blood VI test strips strip Commonly known as:  ASCENSIA AUTODISC VI, ONE TOUCH ULTRA TEST VI  
Use as directed. DX E11.65 HumaLOG KwikPen 100 unit/mL kwikpen Generic drug:  insulin lispro INJECT 4 units SUBCUTANEOUSLY WITH MEALS AS DIRECTED  
  
 ibuprofen 800 mg tablet Commonly known as:  MOTRIN  
TAKE ONE TABLET BY MOUTH EVERY 6 HOURS WITH FOOD AS NEEDED  Indications: OSTEOARTHRITIS  
  
 insulin detemir 100 unit/mL (3 mL) Inpn Commonly known as:  LEVEMIR FLEXPEN  
40 units sq bid  Indications: DIABETES MELLITUS WITH SEVERE INSULIN RESISTANCE  
  
 insulin syringe-needle U-100 1/2 mL 31 gauge x 15/64\" Syrg Commonly known as:  BD INSULIN SYRINGE ULTRA-FINE  
1 Units by Does Not Apply route daily as needed. DX E11.65  
  
 latanoprost 0.005 % ophthalmic solution Commonly known as:  Clifm Min Administer 1 Drop to both eyes nightly. losartan 100 mg tablet Commonly known as:  COZAAR Take 1 Tab by mouth daily. meclizine 12.5 mg tablet Commonly known as:  ANTIVERT Take 12.5 mg by mouth three (3) times daily as needed. TAKE 1 PILL A DAY PRN  
  
 metFORMIN  mg tablet Commonly known as:  GLUCOPHAGE XR Take 1 Tab by mouth two (2) times daily (with meals). multivitamin tablet Commonly known as:  ONE A DAY Take 1 Tab by mouth daily. mycophenolate 500 mg tablet Commonly known as:  CELLCEPT Take 1,000 mg by mouth 2 times daily. Do not chew or crush tablet Omega-3-DHA-EPA-Fish Oil 1,000 mg (120 mg-180 mg) Cap Take  by mouth. TAKE TWICE A DAY  
  
 omeprazole 40 mg capsule Commonly known as:  PRILOSEC Take 40 mg by mouth daily. simvastatin 40 mg tablet Commonly known as:  ZOCOR  
TAKE ONE TABLET BY MOUTH EVERY NIGHT  
  
 timolol maleate 0.5 % Drpd ophthalmic solution Administer 1 Drop to both eyes daily. venlafaxine 100 mg tablet Commonly known as:  EFFEXOR  
TAKE ONE TABLET BY MOUTH 2 TIMES A DAY Prescriptions Sent to Pharmacy Refills  
 amoxicillin (AMOXIL) 500 mg capsule 0 Sig: Take 1 Cap by mouth once for 1 dose. Class: Normal  
 Pharmacy: [address], [address] Ph #: [phone] Route: Oral  
  
Introducing [facility]! Dear Elvin Chen: Thank you for requesting a [employer] account. Our records indicate that you have previously registered for a [employer] account but its currently inactive. Please call our [employer] support line at [phone]. Additional Information If you have questions, please visit the Frequently Asked Questions section of the [employer] website at [email or web address] Remember, [employer] is NOT to be used for urgent needs. For medical emergencies, dial 911. Now available from your iPhone and Android! Please provide this summary of care documentation to your next provider. Your primary care clinician is listed as Сергей Montoya. If you have any questions after today's visit, please call [phone].

## 2017-08-28 NOTE — PROGRESS NOTES
Subjective:     Bess Hercules is a 57 y.o. female who presents today with the following:  Chief Complaint   Patient presents with    Diarrhea    Sore Throat   Bess Hercules presents for f/u for hoarseness . Using  Humidified oxygen at night. Wakes up with a dry throat. Discussed trying a humidifier at night. She is receptive to try . Diarrhea: loose stools resolving. No loose stools in a couple of days. She will continue to monitor diet. Brought in FIT test.    COMPLIANT WITH MEDICATION:   HTN; Denies chest pain, dyspnea, palpitations, headache and blurred vision. Blood pressure normotensive. Penicillin desensitization: Had testing at [facility] last week. One last test ran out of time. Requests taking 500 mg of Amoxicillin in office to be monitored for an hour. Would require PCN medication for lung infection if needed. Will plan after trigger finger surgery. ROS:  Gen: denies fever, chills, fatigue, weight loss, weight gain  HEENT:denies blurry vision, nasal congestion, sore throat  Resp: denies dypsnea, cough, wheezing  CV: denies chest pain radiating to the jaws or arms, palpitations, lower extremity edema  Abd: denies nausea, vomiting,posiitve diarrhea resolving, constipation  Neuro: denies numbness/tingling  Endo: denies polyuria, polydipsia, heat/cold intolerance  Heme: no lymphadenopathy    Allergies   Allergen Reactions    Cephalosporins Unknown (comments)     Told to avoid by doctor    Codeine Vertigo    Doxycycline Unknown (comments)    Lodine [Etodolac] Hives     Sweats, shakes    Penicillins Hives    Sulfa (Sulfonamide Antibiotics) Rash         Current Outpatient Prescriptions:     amoxicillin (AMOXIL) 500 mg capsule, Take 1 Cap by mouth once for 1 dose., Disp: 1 Cap, Rfl: 0    mycophenolate (CELLCEPT) 500 mg tablet, Take 1,000 mg by mouth 2 times daily. Do not chew or crush tablet, Disp: 120 Tab, Rfl: 5    dapsone 100 mg tablet, Take 100 mg by mouth daily. , Disp: , Rfl:    venlafaxine (EFFEXOR) 100 mg tablet, TAKE ONE TABLET BY MOUTH 2 TIMES A DAY, Disp: 180 Tab, Rfl: 1    omeprazole (PRILOSEC) 40 mg capsule, Take 40 mg by mouth daily. , Disp: 90 Cap, Rfl: 1    simvastatin (ZOCOR) 40 mg tablet, TAKE ONE TABLET BY MOUTH EVERY NIGHT, Disp: 90 Tab, Rfl: 1    ibuprofen (MOTRIN) 800 mg tablet, TAKE ONE TABLET BY MOUTH EVERY 6 HOURS WITH FOOD AS NEEDED  Indications: OSTEOARTHRITIS, Disp: 90 Tab, Rfl: 0    fluticasone (FLONASE) 50 mcg/actuation nasal spray, 2 sprays in each nostril once a day., Disp: 16 g, Rfl: 3    insulin detemir (LEVEMIR FLEXPEN) 100 unit/mL (3 mL) inpn, 40 units sq bid  Indications: DIABETES MELLITUS WITH SEVERE INSULIN RESISTANCE, Disp: 45 mL, Rfl: 10    metFORMIN ER (GLUCOPHAGE XR) 500 mg tablet, Take 1 Tab by mouth two (2) times daily (with meals). (Patient taking differently: Take 1,000 mg by mouth two (2) times daily (with meals). ), Disp: 60 Tab, Rfl: 10    insulin syringe-needle U-100 (BD INSULIN SYRINGE ULTRA-FINE) 1/2 mL 31 gauge x 15/64\" syrg, 1 Units by Does Not Apply route daily as needed. DX E11.65, Disp: 90 Pen Needle, Rfl: 1 yr    glucose blood VI test strips (ASCENSIA AUTODISC VI, ONE TOUCH ULTRA TEST VI) strip, Use as directed. DX E11.65, Disp: 100 Strip, Rfl: 1 yr    HUMALOG KWIKPEN 100 unit/mL kwikpen, INJECT 4 units SUBCUTANEOUSLY WITH MEALS AS DIRECTED, Disp: 15 mL, Rfl: 0    losartan (COZAAR) 100 mg tablet, Take 1 Tab by mouth daily. , Disp: 90 Tab, Rfl: 2    ALPRAZolam (XANAX) 0.5 mg tablet, Take 1 Tab by mouth nightly as needed for Anxiety or Sleep. Max Daily Amount: 0.5 mg. Indications: ANXIETY, Disp: 45 Tab, Rfl: 0    calcium-vitamin D (OYSTER SHELL) 500 mg(1,250mg) -200 unit per tablet, Take 1 Tab by mouth two (2) times daily (with meals). , Disp: , Rfl:     meclizine (ANTIVERT) 12.5 mg tablet, Take 12.5 mg by mouth three (3) times daily as needed.  TAKE 1 PILL A DAY PRN, Disp: , Rfl:     timolol maleate 0.5 % DrpD ophthalmic solution, Administer 1 Drop to both eyes daily. , Disp: , Rfl:     latanoprost (XALATAN) 0.005 % ophthalmic solution, Administer 1 Drop to both eyes nightly., Disp: , Rfl:     multivitamin (ONE A DAY) tablet, Take 1 Tab by mouth daily. , Disp: , Rfl:     Omega-3-DHA-EPA-Fish Oil 1,000 (120-180) mg cap, Take  by mouth. TAKE TWICE A DAY, Disp: , Rfl:     aspirin 81 mg tablet, Take 81 mg by mouth daily. , Disp: , Rfl:     Past Medical History:   Diagnosis Date    Colon polyps     Depression     Diverticulosis     Fatty liver 2009    due to steroids    GERD (gastroesophageal reflux disease)     Glaucoma     Interstitial lung disease (HCC)     Menopause     onset at age 49    Mild dysplasia of cervix 1984    Palpitations     Peripheral artery disease ([facility] [address]     stent behind left knee.     Pneumonia 2013    hospitalized for 8 days    Pneumonitis 2007    Type 2 diabetes mellitus ([facility] [address]     2007    Undiagnosed cardiac murmurs        Past Surgical History:   Procedure Laterality Date    HX CARPAL TUNNEL RELEASE  1/2000    bilateral    HX COLONOSCOPY  1/2006, 2013    polyps present    HX GYN  1/1984    Cervical conization    HX HEART CATHETERIZATION  09/2016    [facility]-normal coronaries, normal LV function, normal hemodynamics    HX KNEE ARTHROSCOPY  1/1995    HX KNEE ARTHROSCOPY  1/2000    HX ORTHOPAEDIC      bilat. knee surgery    HX ORTHOPAEDIC Right 12/27/2016    A1 pulley release of the right middle finger    HX OTHER SURGICAL  2007    lung biopsy    VASCULAR SURGERY PROCEDURE UNLIST  10/14    left leg clot; s/p stent        History   Smoking Status    Former Smoker    Packs/day: 0.80    Years: 25.00    Types: Cigarettes    Quit date: 10/1/2000   Smokeless Tobacco    Never Used       Social History     Social History    Marital status:      Spouse name: N/A    Number of children: N/A    Years of education: N/A     Social History Main Topics    Smoking status: Former Smoker     Packs/day: 0.80 Years: 25.00     Types: Cigarettes     Quit date: 10/1/2000    Smokeless tobacco: Never Used    Alcohol use 4.2 oz/week     7 Standard drinks or equivalent per week    Drug use: No    Sexual activity: Yes     Partners: Male     Other Topics Concern    None     Social History Narrative    Happily . No concern for abuse. Exercise: none    Diet: Careful. Wear Seatbelts               Family History   Problem Relation Age of Onset    Breast Cancer Mother     Cancer Mother      multiple myeloma    Osteoporosis Mother     Deep Vein Thrombosis Mother     Hypertension Mother     Heart Disease Father     Cancer Maternal Grandmother      multiple myeloma    Cancer Sister      Melanoma         Objective:     Visit Vitals    /60    Pulse 99    Temp 98.1 °F (36.7 °C) (Oral)    Resp 16    Ht 5' 9\" (1.753 m)    Wt 193 lb 9.6 oz (87.8 kg)    SpO2 95%    BMI 28.59 kg/m2     Body mass index is 28.59 kg/(m^2). General: Alert and oriented. No acute distress. Well nourished  HEENT :  Ears:TMs are normal. Canals are clear. Eyes: pupils equal, round, react to light and accommodation. Extra ocular movements intact. Nose: patent. Mouth and throat is clear. Neck:supple full range of motion no thyromegaly. Trachea midline, No carotid bruits. No significant lymphadenopathy  Lungs[de-identified] clear to auscultation without wheezes, rales, or rhonchi. Heart :RRR, S1 & S2 are normal intensity. No murmur; no gallop  Abdomen: bowel sounds active. No tenderness, guarding, rebound, masses, hepatic or spleen enlargement  Back: no CVA tenderness. Extremities: without clubbing, cyanosis, or edema  Pulses: radial and femoral pulses are normal  Neuro: HMF intact.  Cranial nerves II through XII grossly normal.  Motor: is 5 over 5 and symmetrical.   Deep tendon reflexes: +2 equal    Results for orders placed or performed in visit on 08/11/17   AMB POC RAPID STREP A   Result Value Ref Range    VALID INTERNAL CONTROL POC Yes     Group A Strep Ag Negative Negative       No results found for this visit on 08/28/17. Assessment/ Plan:     Diagnoses and all orders for this visit:    1. Interstitial lung disease ([address]    2. Loose stools    3. Dry throat    Other orders  -     amoxicillin (AMOXIL) 500 mg capsule; Take 1 Cap by mouth once for 1 dose. 1. Interstitial lung disease ([address]    2. Loose stools    3. Dry throat        Orders Placed This Encounter    amoxicillin (AMOXIL) 500 mg capsule     Sig: Take 1 Cap by mouth once for 1 dose. Dispense:  1 Cap     Refill:  0   RTO in 3 months or sooner as needed. Verbal and written instructions (see AVS) provided.  Patient expresses understanding of diagnosis and treatment plan.     ISAURO Michael

## 2017-08-30 ENCOUNTER — TELEPHONE (OUTPATIENT)
Dept: FAMILY MEDICINE CLINIC | Age: 62
End: 2017-08-30

## 2017-08-30 NOTE — TELEPHONE ENCOUNTER
Patient informed to check with her [facility] doctor about her PCN allergy test. Also given her FOBT result.

## 2017-08-30 NOTE — TELEPHONE ENCOUNTER
ML for patient to call back following up a question she had about trying her on a PCN pill to assess her allergic reaction. Unable to do here ,will need to be seen where there is emergency equipment in case of severe anaphylactic response. Will need to check with [facility] doctor.

## 2017-09-04 NOTE — H&P
Patient ID: Mauricio Calix is a 57 y.o. female.     Chief Complaint: Pain of the Right Hand and Pain of the Left Hand     70-year-old female here at the request of Dr. Yumiko Melendez. She complains of left hand pain. She has a history of multiple trigger fingers. She has had multiple cortisone injections. She has had a left trigger thumb release. She has had a trigger release on the right side as well. Her main complaint is index and ring finger pain. She recently had a cortisone injection for the ring finger without any relief. She denies numbness or tingling. She also recently had a nerve conduction study. No evidence of recurrent carpal tunnel syndrome. She is status post bilateral carpal tunnel releases. She also complains of right index finger pain. Denies numbness or tingling on the right side as well.     Past medical history significant for diabetes on insulin. No current facility-administered medications on file prior to encounter. Current Outpatient Prescriptions on File Prior to Encounter   Medication Sig Dispense Refill    mycophenolate (CELLCEPT) 500 mg tablet Take 1,000 mg by mouth 2 times daily. Do not chew or crush tablet 120 Tab 5    dapsone 100 mg tablet Take 100 mg by mouth daily.  venlafaxine (EFFEXOR) 100 mg tablet TAKE ONE TABLET BY MOUTH 2 TIMES A  Tab 1    omeprazole (PRILOSEC) 40 mg capsule Take 40 mg by mouth daily. 90 Cap 1    simvastatin (ZOCOR) 40 mg tablet TAKE ONE TABLET BY MOUTH EVERY NIGHT 90 Tab 1    ibuprofen (MOTRIN) 800 mg tablet TAKE ONE TABLET BY MOUTH EVERY 6 HOURS WITH FOOD AS NEEDED  Indications: OSTEOARTHRITIS 90 Tab 0    fluticasone (FLONASE) 50 mcg/actuation nasal spray 2 sprays in each nostril once a day.  16 g 3    insulin detemir (LEVEMIR FLEXPEN) 100 unit/mL (3 mL) inpn 40 units sq bid  Indications: DIABETES MELLITUS WITH SEVERE INSULIN RESISTANCE 45 mL 10    metFORMIN ER (GLUCOPHAGE XR) 500 mg tablet Take 1 Tab by mouth two (2) times daily (with meals). (Patient taking differently: Take 1,000 mg by mouth two (2) times daily (with meals). ) 60 Tab 10    insulin syringe-needle U-100 (BD INSULIN SYRINGE ULTRA-FINE) 1/2 mL 31 gauge x 15/64\" syrg 1 Units by Does Not Apply route daily as needed. DX E11.65 90 Pen Needle 1 yr    glucose blood VI test strips (ASCENSIA AUTODISC VI, ONE TOUCH ULTRA TEST VI) strip Use as directed. DX E11.65 100 Strip 1 yr    HUMALOG KWIKPEN 100 unit/mL kwikpen INJECT 4 units SUBCUTANEOUSLY WITH MEALS AS DIRECTED 15 mL 0    losartan (COZAAR) 100 mg tablet Take 1 Tab by mouth daily. 90 Tab 2    ALPRAZolam (XANAX) 0.5 mg tablet Take 1 Tab by mouth nightly as needed for Anxiety or Sleep. Max Daily Amount: 0.5 mg. Indications: ANXIETY 45 Tab 0    calcium-vitamin D (OYSTER SHELL) 500 mg(1,250mg) -200 unit per tablet Take 1 Tab by mouth two (2) times daily (with meals).  meclizine (ANTIVERT) 12.5 mg tablet Take 12.5 mg by mouth three (3) times daily as needed. TAKE 1 PILL A DAY PRN      timolol maleate 0.5 % DrpD ophthalmic solution Administer 1 Drop to both eyes daily.  latanoprost (XALATAN) 0.005 % ophthalmic solution Administer 1 Drop to both eyes nightly.  multivitamin (ONE A DAY) tablet Take 1 Tab by mouth daily.  Omega-3-DHA-EPA-Fish Oil 1,000 (120-180) mg cap Take  by mouth. TAKE TWICE A DAY      aspirin 81 mg tablet Take 81 mg by mouth daily. Past Medical History:   Diagnosis Date    Colon polyps     Depression     Diverticulosis     Fatty liver 2009    due to steroids    GERD (gastroesophageal reflux disease)     Glaucoma     Interstitial lung disease (HCC)     Menopause     onset at age 49    Mild dysplasia of cervix 1984    Palpitations     Peripheral artery disease ([facility] [address]     stent behind left knee.     Pneumonia 2013    hospitalized for 8 days    Pneumonitis 2007    Type 2 diabetes mellitus ([facility] [address]     2007    Undiagnosed cardiac murmurs        Allergies   Allergen Reactions    Cephalosporins Unknown (comments)     Told to avoid by doctor    Codeine Vertigo    Doxycycline Unknown (comments)    Lodine [Etodolac] Hives     Sweats, shakes    Penicillins Hives    Sulfa (Sulfonamide Antibiotics) Rash       ROS neg    Social History     Social History    Marital status:      Spouse name: N/A    Number of children: N/A    Years of education: N/A     Occupational History    Not on file. Social History Main Topics    Smoking status: Former Smoker     Packs/day: 0.80     Years: 25.00     Types: Cigarettes     Quit date: 10/1/2000    Smokeless tobacco: Never Used    Alcohol use 4.2 oz/week     7 Standard drinks or equivalent per week    Drug use: No    Sexual activity: Yes     Partners: Male     Other Topics Concern    Not on file     Social History Narrative    Happily . No concern for abuse. Exercise: none    Diet: Careful. Wear Seatbelts                        Objective   Objective:   Constitutional:  No acute distress. Well nourished. Well developed. Eyes:  Sclera are nonicteric. Respiratory:  No labored breathing. Cardiovascular:  No marked edema. Skin:  No marked skin ulcers. Neurological:  No marked sensory loss noted. Psychiatric: Alert and oriented x3. Musculoskeletal   She has exquisite tenderness over the A1 pulleys of the left index and ring fingers. She has significant mechanical symptoms. Large nodules. She has a well-healed incision over the A1 pulley of the thumb. She has a long incision in the palm overlying the middle finger from a laceration. She has intact median nerve sensibility. Well-healed carpal tunnel incision. On the right side she has tenderness over the index finger A1 pulley. Small nodule. Mild mechanical symptoms. Intact median nerve sensibility.     Radiographs:        No imaging obtained         Assessment   Assessment:      1. Trigger index finger of left hand    2.  Trigger finger, left ring finger 3. Trigger finger, right index finger                Plan   Plan:   We discussed her treatment options at length. My recommendation is surgical management. She is in agreement. We discussed reasonable expectations. We discussed usual postoperative course. She has given informed consent to proceed with left index and ring finger trigger releases.

## 2017-09-05 ENCOUNTER — ANESTHESIA EVENT (OUTPATIENT)
Dept: MEDSURG UNIT | Age: 62
End: 2017-09-05
Payer: COMMERCIAL

## 2017-09-05 ENCOUNTER — TELEPHONE (OUTPATIENT)
Dept: FAMILY MEDICINE CLINIC | Age: 62
End: 2017-09-05

## 2017-09-05 NOTE — TELEPHONE ENCOUNTER
Please review note from 08/30/17 done by you Buffy Nazario like you had already spoken to patient.   Is there any thing else I need to advise her of ?

## 2017-09-05 NOTE — TELEPHONE ENCOUNTER
Patient called to report  diarrhea 2-3 times a day on and off for 1 month,denies fever or pain but does feel \"awful' fatigue and lightheaded,does have her hand surgery tomorrow.   Also she has changed her endocrinologist to DR LEONARD [facility] [place] will schedule her appointment once they review her labs that will be faxed to [phone]

## 2017-09-06 ENCOUNTER — HOSPITAL ENCOUNTER (OUTPATIENT)
Age: 62
Setting detail: OUTPATIENT SURGERY
Discharge: HOME | End: 2017-09-06
Attending: ORTHOPAEDIC SURGERY | Admitting: ORTHOPAEDIC SURGERY
Payer: COMMERCIAL

## 2017-09-06 ENCOUNTER — ANESTHESIA (OUTPATIENT)
Dept: MEDSURG UNIT | Age: 62
End: 2017-09-06
Payer: COMMERCIAL

## 2017-09-06 VITALS
HEIGHT: 70 IN | TEMPERATURE: 97.4 F | WEIGHT: 190 LBS | DIASTOLIC BLOOD PRESSURE: 66 MMHG | HEART RATE: 75 BPM | RESPIRATION RATE: 18 BRPM | OXYGEN SATURATION: 93 % | BODY MASS INDEX: 27.2 KG/M2 | SYSTOLIC BLOOD PRESSURE: 136 MMHG

## 2017-09-06 LAB
GLUCOSE BLD STRIP.AUTO-MCNC: 167 MG/DL (ref 65–100)
GLUCOSE BLD STRIP.AUTO-MCNC: 177 MG/DL (ref 65–100)
SERVICE CMNT-IMP: ABNORMAL
SERVICE CMNT-IMP: ABNORMAL

## 2017-09-06 PROCEDURE — 77030028224 HC PDNG CST BSNM -A: Performed by: ORTHOPAEDIC SURGERY

## 2017-09-06 PROCEDURE — 77030020782 HC GWN BAIR PAWS FLX 3M -B

## 2017-09-06 PROCEDURE — 74011000250 HC RX REV CODE- 250: Performed by: ORTHOPAEDIC SURGERY

## 2017-09-06 PROCEDURE — 74011250637 HC RX REV CODE- 250/637: Performed by: ORTHOPAEDIC SURGERY

## 2017-09-06 PROCEDURE — 74011250636 HC RX REV CODE- 250/636

## 2017-09-06 PROCEDURE — 74011250636 HC RX REV CODE- 250/636: Performed by: ORTHOPAEDIC SURGERY

## 2017-09-06 PROCEDURE — 76060000073 HC AMB SURG ANES FIRST 0.5 HR: Performed by: ORTHOPAEDIC SURGERY

## 2017-09-06 PROCEDURE — 77030020754 HC CUF TRNQT 2BLA STRY -B: Performed by: ORTHOPAEDIC SURGERY

## 2017-09-06 PROCEDURE — 76030000002 HC AMB SURG OR TIME FIRST 0.: Performed by: ORTHOPAEDIC SURGERY

## 2017-09-06 PROCEDURE — 76210000035 HC AMBSU PH I REC 1 TO 1.5 HR: Performed by: ORTHOPAEDIC SURGERY

## 2017-09-06 PROCEDURE — 77030018836 HC SOL IRR NACL ICUM -A: Performed by: ORTHOPAEDIC SURGERY

## 2017-09-06 PROCEDURE — 74011250636 HC RX REV CODE- 250/636: Performed by: ANESTHESIOLOGY

## 2017-09-06 PROCEDURE — 76210000056 HC AMBSU PH II REC 1.5 TO 2 HR: Performed by: ORTHOPAEDIC SURGERY

## 2017-09-06 PROCEDURE — 77030002916 HC SUT ETHLN J&J -A: Performed by: ORTHOPAEDIC SURGERY

## 2017-09-06 PROCEDURE — 82962 GLUCOSE BLOOD TEST: CPT

## 2017-09-06 RX ORDER — SODIUM CHLORIDE 0.9 % (FLUSH) 0.9 %
5-10 SYRINGE (ML) INJECTION AS NEEDED
Status: DISCONTINUED | OUTPATIENT
Start: 2017-09-06 | End: 2017-09-06 | Stop reason: HOSPADM

## 2017-09-06 RX ORDER — LIDOCAINE HYDROCHLORIDE 10 MG/ML
0.1 INJECTION, SOLUTION EPIDURAL; INFILTRATION; INTRACAUDAL; PERINEURAL AS NEEDED
Status: DISCONTINUED | OUTPATIENT
Start: 2017-09-06 | End: 2017-09-06 | Stop reason: HOSPADM

## 2017-09-06 RX ORDER — ONDANSETRON 2 MG/ML
4 INJECTION INTRAMUSCULAR; INTRAVENOUS AS NEEDED
Status: DISCONTINUED | OUTPATIENT
Start: 2017-09-06 | End: 2017-09-06 | Stop reason: HOSPADM

## 2017-09-06 RX ORDER — HYDROCODONE BITARTRATE AND ACETAMINOPHEN 5; 325 MG/1; MG/1
1 TABLET ORAL
Status: COMPLETED | OUTPATIENT
Start: 2017-09-06 | End: 2017-09-06

## 2017-09-06 RX ORDER — FENTANYL CITRATE 50 UG/ML
25 INJECTION, SOLUTION INTRAMUSCULAR; INTRAVENOUS
Status: DISCONTINUED | OUTPATIENT
Start: 2017-09-06 | End: 2017-09-06 | Stop reason: HOSPADM

## 2017-09-06 RX ORDER — SODIUM CHLORIDE, SODIUM LACTATE, POTASSIUM CHLORIDE, CALCIUM CHLORIDE 600; 310; 30; 20 MG/100ML; MG/100ML; MG/100ML; MG/100ML
50 INJECTION, SOLUTION INTRAVENOUS CONTINUOUS
Status: DISCONTINUED | OUTPATIENT
Start: 2017-09-06 | End: 2017-09-06 | Stop reason: HOSPADM

## 2017-09-06 RX ORDER — SODIUM CHLORIDE 0.9 % (FLUSH) 0.9 %
5-10 SYRINGE (ML) INJECTION EVERY 8 HOURS
Status: DISCONTINUED | OUTPATIENT
Start: 2017-09-06 | End: 2017-09-06 | Stop reason: HOSPADM

## 2017-09-06 RX ORDER — FENTANYL CITRATE 50 UG/ML
INJECTION, SOLUTION INTRAMUSCULAR; INTRAVENOUS AS NEEDED
Status: DISCONTINUED | OUTPATIENT
Start: 2017-09-06 | End: 2017-09-06 | Stop reason: HOSPADM

## 2017-09-06 RX ORDER — PROPOFOL 10 MG/ML
INJECTION, EMULSION INTRAVENOUS AS NEEDED
Status: DISCONTINUED | OUTPATIENT
Start: 2017-09-06 | End: 2017-09-06 | Stop reason: HOSPADM

## 2017-09-06 RX ORDER — PROPOFOL 10 MG/ML
INJECTION, EMULSION INTRAVENOUS
Status: DISCONTINUED | OUTPATIENT
Start: 2017-09-06 | End: 2017-09-06 | Stop reason: HOSPADM

## 2017-09-06 RX ORDER — HYDROCODONE BITARTRATE AND ACETAMINOPHEN 5; 325 MG/1; MG/1
1-2 TABLET ORAL
Qty: 15 TAB | Refills: 0 | Status: SHIPPED | OUTPATIENT
Start: 2017-09-06 | End: 2018-03-01

## 2017-09-06 RX ORDER — MIDAZOLAM HYDROCHLORIDE 1 MG/ML
INJECTION, SOLUTION INTRAMUSCULAR; INTRAVENOUS AS NEEDED
Status: DISCONTINUED | OUTPATIENT
Start: 2017-09-06 | End: 2017-09-06 | Stop reason: HOSPADM

## 2017-09-06 RX ORDER — ONDANSETRON 2 MG/ML
INJECTION INTRAMUSCULAR; INTRAVENOUS AS NEEDED
Status: DISCONTINUED | OUTPATIENT
Start: 2017-09-06 | End: 2017-09-06 | Stop reason: HOSPADM

## 2017-09-06 RX ORDER — MORPHINE SULFATE 10 MG/ML
2 INJECTION, SOLUTION INTRAMUSCULAR; INTRAVENOUS
Status: DISCONTINUED | OUTPATIENT
Start: 2017-09-06 | End: 2017-09-06 | Stop reason: HOSPADM

## 2017-09-06 RX ORDER — HYDROMORPHONE HYDROCHLORIDE 1 MG/ML
0.2 INJECTION, SOLUTION INTRAMUSCULAR; INTRAVENOUS; SUBCUTANEOUS
Status: DISCONTINUED | OUTPATIENT
Start: 2017-09-06 | End: 2017-09-06 | Stop reason: HOSPADM

## 2017-09-06 RX ADMIN — SODIUM CHLORIDE, POTASSIUM CHLORIDE, SODIUM LACTATE AND CALCIUM CHLORIDE 50 ML/HR: 600; 310; 30; 20 INJECTION, SOLUTION INTRAVENOUS at 07:20

## 2017-09-06 RX ADMIN — PROPOFOL 50 MG: 10 INJECTION, EMULSION INTRAVENOUS at 07:44

## 2017-09-06 RX ADMIN — PROPOFOL 10 MG: 10 INJECTION, EMULSION INTRAVENOUS at 07:30

## 2017-09-06 RX ADMIN — ONDANSETRON 4 MG: 2 INJECTION INTRAMUSCULAR; INTRAVENOUS at 07:42

## 2017-09-06 RX ADMIN — MIDAZOLAM HYDROCHLORIDE 2 MG: 1 INJECTION, SOLUTION INTRAMUSCULAR; INTRAVENOUS at 07:30

## 2017-09-06 RX ADMIN — VANCOMYCIN HYDROCHLORIDE 1 G: 1 INJECTION, POWDER, LYOPHILIZED, FOR SOLUTION INTRAVENOUS at 07:34

## 2017-09-06 RX ADMIN — FENTANYL CITRATE 25 MCG: 50 INJECTION, SOLUTION INTRAMUSCULAR; INTRAVENOUS at 07:36

## 2017-09-06 RX ADMIN — PROPOFOL 50 MG: 10 INJECTION, EMULSION INTRAVENOUS at 07:47

## 2017-09-06 RX ADMIN — FENTANYL CITRATE 25 MCG: 50 INJECTION, SOLUTION INTRAMUSCULAR; INTRAVENOUS at 07:30

## 2017-09-06 RX ADMIN — FENTANYL CITRATE 25 MCG: 50 INJECTION, SOLUTION INTRAMUSCULAR; INTRAVENOUS at 07:51

## 2017-09-06 RX ADMIN — PROPOFOL 40 MG: 10 INJECTION, EMULSION INTRAVENOUS at 07:39

## 2017-09-06 RX ADMIN — FENTANYL CITRATE 25 MCG: 50 INJECTION, SOLUTION INTRAMUSCULAR; INTRAVENOUS at 07:56

## 2017-09-06 RX ADMIN — HYDROCODONE BITARTRATE AND ACETAMINOPHEN 1 TABLET: 5; 325 TABLET ORAL at 10:40

## 2017-09-06 RX ADMIN — PROPOFOL 20 MG: 10 INJECTION, EMULSION INTRAVENOUS at 07:35

## 2017-09-06 RX ADMIN — PROPOFOL 50 MCG/KG/MIN: 10 INJECTION, EMULSION INTRAVENOUS at 07:30

## 2017-09-06 NOTE — ROUTINE PROCESS
Patient: Charbel [facility] MRN: [id number]  SSN: [id number]   YOB: 1955  Age: 57 y.o. Sex: female     Patient is status post Procedure(s):  LEFT INDEX FINGER AND RING FINGER TRIGGER RELEASE.     Surgeon(s) and Role:     * Cristina Pritchett MD - Primary    Local/Dose/Irrigation:                                           Dressing/Packing:  Wound Hand Left-DRESSING TYPE:  (xeroform, 4x4 cast padding coban) (09/06/17 0700)  Splint/Cast:  ]    Other:

## 2017-09-06 NOTE — DISCHARGE INSTRUCTIONS
Dr. Radha Abdalla Postoperative Instructions    1. Please maintain the dressing placed at surgery for 5 days. You may remove it in 5 days. Please keep the dressing clean and dry for 5 days. In 5 days you may get the wound wet and then cover it with a bandaid. If you have any questions or problems with your dressing, please call our office at [phone].  2. Please elevate the operative extremity to the level of the heart to keep swelling at a minimum. You may get up to move around, but when seated please keep the extremity elevated as much as possible. This will decrease swelling and postoperative pain. You may do activities as tolerated. 3. You may ice your arm/hand 5 times a day for 20 minutes at a time. 4. A prescription for pain medication is provided. The key to pain control is staying ahead of the pain. For the first 48-72 hours after your surgery, you may want to take your pain medication on a regular schedule. After that, you may only need it on an as needed basis. 5. Signs and symptoms of infection include: redness, increased pain, increased swelling not relieved by elevation, drainage, fever, or chills, If you develop any of these symptoms, call the office at [phone]. 6. Please Follow-up at my office 14 days after surgery or when specified at your pre-operative appointment. DISCHARGE SUMMARY from Nurse    The following personal items are in your possession at time of discharge:    Dental Appliances: None           Jewelry: None  Clothing:  (clothes in locker)                PATIENT INSTRUCTIONS:    After general anesthesia or intravenous sedation, for 24 hours or while taking prescription Narcotics:  · Limit your activities  · Do not drive and operate hazardous machinery  · Do not make important personal or business decisions  · Do  not drink alcoholic beverages  · If you have not urinated within 8 hours after discharge, please contact your surgeon on call.     Report the following to your surgeon:  · Excessive pain, swelling, redness or odor of or around the surgical area  · Temperature over 100.5  · Nausea and vomiting lasting longer than 4 hours or if unable to take medications  · Any signs of decreased circulation or nerve impairment to extremity: change in color, persistent  numbness, tingling, coldness or increase pain  · Any questions        What to do at Home:  Recommended activity: Activity as tolerated and no driving for today and No driving while on analgesics,     If you experience any of the following symptoms ; as noted above, please follow up with . *  Please give a list of your current medications to your Primary Care Provider. *  Please update this list whenever your medications are discontinued, doses are      changed, or new medications (including over-the-counter products) are added. *  Please carry medication information at all times in case of emergency situations. These are general instructions for a healthy lifestyle:    No smoking/ No tobacco products/ Avoid exposure to second hand smoke    Surgeon General's Warning:  Quitting smoking now greatly reduces serious risk to your health. Obesity, smoking, and sedentary lifestyle greatly increases your risk for illness    A healthy diet, regular physical exercise & weight monitoring are important for maintaining a healthy lifestyle    You may be retaining fluid if you have a history of heart failure or if you experience any of the following symptoms:  Weight gain of 3 pounds or more overnight or 5 pounds in a week, increased swelling in our hands or feet or shortness of breath while lying flat in bed. Please call your doctor as soon as you notice any of these symptoms; do not wait until your next office visit.     Recognize signs and symptoms of STROKE:    F-face looks uneven    A-arms unable to move or move unevenly    S-speech slurred or non-existent    T-time-call 911 as soon as signs and symptoms begin-DO NOT go       Back to bed or wait to see if you get better-TIME IS BRAIN. Warning Signs of HEART ATTACK     Call 911 if you have these symptoms:   Chest discomfort. Most heart attacks involve discomfort in the center of the chest that lasts more than a few minutes, or that goes away and comes back. It can feel like uncomfortable pressure, squeezing, fullness, or pain.  Discomfort in other areas of the upper body. Symptoms can include pain or discomfort in one or both arms, the back, neck, jaw, or stomach.  Shortness of breath with or without chest discomfort.  Other signs may include breaking out in a cold sweat, nausea, or lightheadedness. Don't wait more than five minutes to call 911 - MINUTES MATTER! Fast action can save your life. Calling 911 is almost always the fastest way to get lifesaving treatment. Emergency Medical Services staff can begin treatment when they arrive -- up to an hour sooner than if someone gets to the hospital by car. The discharge information has been reviewed with the patient and spouse. The patient and spouse verbalized understanding. Discharge medications reviewed with the patient and spouse and appropriate educational materials and side effects teaching were provided.

## 2017-09-06 NOTE — INTERVAL H&P NOTE
H&P Update:  Mickie Breen was seen and examined. History and physical has been reviewed. The patient has been examined.  There have been no significant clinical changes since the completion of the originally dated History and Physical.    Signed By: Ambika León MD     September 6, 2017 7:28 AM

## 2017-09-06 NOTE — OP NOTES
PREOPERATIVE DIAGNOSIS: Left index and ring finger trigger.     POSTOPERATIVE DIAGNOSIS: Left index and ring finger trigger.     PROCEDURES PERFORMED: Release of left index and ring finger trigger.     SURGEON: Ramona Carrasco. Luca Condon MD.     ANESTHESIA: Sedation.     ESTIMATED BLOOD LOSS: Minimal.     SPECIMENS REMOVED: None.      COMPLICATIONS: None.     IMPLANTS: None.     INDICATIONS FOR PROCEDURE: This is a 56 yo female who   has left index and ring trigger fingers, which have not responded to   conservative treatment. She has elected for surgical management. We   discussed risks, benefits, potential complications and alternatives of   surgery, and she gave informed consent to proceed.     DESCRIPTION OF PROCEDURE: The patient was identified in the   preoperative holding area. Informed consent was obtained. The   operative site was marked. She was then transported to the OR and   placed supine on the operating table. All bony prominences were well-  padded. A tourniquet was applied to the upper brachium. After   induction of deep sedation, the planned surgical sites were anesthetized   with 0.5% Marcaine and 1% lidocaine. The left upper extremity was   then sterilely prepped and draped in the usual fashion. Surgical time-  out was held. The operative site was confirmed. Preoperative   antibiotics were used. After Esmarch exsanguination, the   tourniquet was elevated to 250 mmHg. An oblique incision was made   in the palm over the A1 pulley of the affected digits. Blunt dissection was performed. The radial and ulnar neurovascular bundles were protected throughout the   remainder of the case. The A1 pulley was incised. Complete release   was confirmed both proximally and distally. The digits were taken   through a full range of motion without triggering. The wound   was irrigated. The skin was closed with 4-0 nylon sutures. Sterile   dressings were applied.  The tourniquet was let down and brisk cap   refill returned to the digits. She was transferred to the PACU in stable   condition without complication.

## 2017-09-06 NOTE — IP AVS SNAPSHOT
27[address] [facility] [address] [place] [address] 
[phone] Patient: Gorge Ruffin MRN: [id number] :1955 You are allergic to the following Allergen Reactions Cephalosporins Unknown (comments) Told to avoid by doctor Codeine Vertigo Doxycycline Unknown (comments) Lodine (Etodolac) Hives Sweats, shakes Penicillins Hives Sulfa (Sulfonamide Antibiotics) Rash Recent Documentation Height Weight BMI OB Status Smoking Status 1.778 m 86.2 kg 27.26 kg/m2 Postmenopausal Former Smoker Emergency Contacts Name Discharge Info Relation Home Work Mobile Lamont Hart  Spouse [3] [phone] Roz Delgado  Other Relative [6] Rob Rodriguez  Spouse [3] [phone] About your hospitalization You were admitted on:  2017 You last received care in the:  [facility] PACU You were discharged on:  2017 Unit phone number:  [phone] Why you were hospitalized Your primary diagnosis was:  Not on File Providers Seen During Your Hospitalizations Provider Role Specialty Primary office phone Osmany Esteves MD Attending Provider Orthopedic Surgery [phone] Your Primary Care Physician (PCP) Primary Care Physician Office Phone Office Fax Sarah Estevez [phone] Follow-up Information Follow up With Details Comments Contact Info Osmany Esteves MD In 2 weeks  [address] [facility] [address] 
[phone] Sammi George NP   [address] [facility] [address] [employer]  
[phone] Your Appointments   3:30 PM EDT ROUTINE CARE with MD Jose Brewer Diabetes and Endocrinology [facility] [facility]) [address] [place] [address] [address] [facility]  
[phone] Current Discharge Medication List  
  
START taking these medications Dose & Instructions Dispensing Information Comments Morning Noon Evening Bedtime HYDROcodone-acetaminophen 5-325 mg per tablet Commonly known as:  Reid Pires Your last dose was: Your next dose is:    
   
   
 Dose:  1-2 Tab Take 1-2 Tabs by mouth every four (4) hours as needed for Pain. Max Daily Amount: 12 Tabs. Quantity:  15 Tab Refills:  0 CONTINUE these medications which have CHANGED Dose & Instructions Dispensing Information Comments Morning Noon Evening Bedtime  
 metFORMIN  mg tablet Commonly known as:  GLUCOPHAGE XR What changed:  how much to take Your last dose was: Your next dose is:    
   
   
 Dose:  500 mg Take 1 Tab by mouth two (2) times daily (with meals). Quantity:  60 Tab Refills:  10  
     
   
   
   
  
  
CONTINUE these medications which have NOT CHANGED Dose & Instructions Dispensing Information Comments Morning Noon Evening Bedtime ALPRAZolam 0.5 mg tablet Commonly known as:  Shari Escobar Your last dose was: Your next dose is:    
   
   
 Dose:  0.5 mg Take 1 Tab by mouth nightly as needed for Anxiety or Sleep. Max Daily Amount: 0.5 mg. Indications: ANXIETY Quantity:  45 Tab Refills:  0  
     
   
   
   
  
 aspirin 81 mg tablet Your last dose was: Your next dose is:    
   
   
 Dose:  81 mg Take 81 mg by mouth daily. Refills:  0  
     
   
   
   
  
 calcium-vitamin D 500 mg(1,250mg) -200 unit per tablet Commonly known as:  OYSTER SHELL Your last dose was: Your next dose is:    
   
   
 Dose:  1 Tab Take 1 Tab by mouth two (2) times daily (with meals). Refills:  0  
     
   
   
   
  
 dapsone 100 mg tablet Your last dose was: Your next dose is: Take 100 mg by mouth daily. Refills:  0 fluticasone 50 mcg/actuation nasal spray Commonly known as:  Leafy Dhaval Your last dose was: Your next dose is:    
   
   
 2 sprays in each nostril once a day. Quantity:  16 g Refills:  3  
     
   
   
   
  
 glucose blood VI test strips strip Commonly known as:  ASCENSIA AUTODISC VI, ONE TOUCH ULTRA TEST VI Your last dose was: Your next dose is:    
   
   
 Use as directed. DX E11.65 Quantity:  100 Strip Refills:  1 yr HumaLOG KwikPen 100 unit/mL kwikpen Generic drug:  insulin lispro Your last dose was: Your next dose is: INJECT 4 units SUBCUTANEOUSLY WITH MEALS AS DIRECTED Quantity:  15 mL Refills:  0  
     
   
   
   
  
 ibuprofen 800 mg tablet Commonly known as:  MOTRIN Your last dose was: Your next dose is: TAKE ONE TABLET BY MOUTH EVERY 6 HOURS WITH FOOD AS NEEDED  Indications: OSTEOARTHRITIS Quantity:  90 Tab Refills:  0  
     
   
   
   
  
 insulin detemir 100 unit/mL (3 mL) Inpn Commonly known as:  Josué Block Your last dose was: Your next dose is:    
   
   
 40 units sq bid  Indications: DIABETES MELLITUS WITH SEVERE INSULIN RESISTANCE Quantity:  45 mL Refills:  10  
     
   
   
   
  
 insulin syringe-needle U-100 1/2 mL 31 gauge x 15/64\" Syrg Commonly known as:  BD INSULIN SYRINGE ULTRA-FINE Your last dose was: Your next dose is:    
   
   
 Dose:  1 Units 1 Units by Does Not Apply route daily as needed. DX E11.65 Quantity:  90 Pen Needle Refills:  1 yr  
     
   
   
   
  
 latanoprost 0.005 % ophthalmic solution Commonly known as:  Alisha Goldsmith Your last dose was: Your next dose is:    
   
   
 Dose:  1 Drop Administer 1 Drop to both eyes nightly. Refills:  0  
     
   
   
   
  
 losartan 100 mg tablet Commonly known as:  COZAAR Your last dose was: Your next dose is: Dose:  100 mg Take 1 Tab by mouth daily. Quantity:  90 Tab Refills:  2  
     
   
   
   
  
 meclizine 12.5 mg tablet Commonly known as:  ANTIVERT Your last dose was: Your next dose is:    
   
   
 Dose:  12.5 mg Take 12.5 mg by mouth three (3) times daily as needed. TAKE 1 PILL A DAY PRN Refills:  0  
     
   
   
   
  
 multivitamin tablet Commonly known as:  ONE A DAY Your last dose was: Your next dose is:    
   
   
 Dose:  1 Tab Take 1 Tab by mouth daily. Refills:  0  
     
   
   
   
  
 mycophenolate 500 mg tablet Commonly known as:  CELLCEPT Your last dose was: Your next dose is: Take 1,000 mg by mouth 2 times daily. Do not chew or crush tablet Quantity:  120 Tab Refills:  5 Omega-3-DHA-EPA-Fish Oil 1,000 mg (120 mg-180 mg) Cap Your last dose was: Your next dose is: Take  by mouth. TAKE TWICE A DAY Refills:  0  
     
   
   
   
  
 omeprazole 40 mg capsule Commonly known as:  PRILOSEC Your last dose was: Your next dose is: Take 40 mg by mouth daily. Quantity:  90 Cap Refills:  1  
     
   
   
   
  
 simvastatin 40 mg tablet Commonly known as:  ZOCOR Your last dose was: Your next dose is: TAKE ONE TABLET BY MOUTH EVERY NIGHT Quantity:  90 Tab Refills:  1  
     
   
   
   
  
 timolol maleate 0.5 % Drpd ophthalmic solution Your last dose was: Your next dose is:    
   
   
 Dose:  1 Drop Administer 1 Drop to both eyes daily. Refills:  0  
     
   
   
   
  
 venlafaxine 100 mg tablet Commonly known as:  [facility] Your last dose was: Your next dose is: TAKE ONE TABLET BY MOUTH 2 TIMES A DAY Quantity:  180 Tab Refills:  1 Where to Get Your Medications Information on where to get these meds will be given to you by the nurse or doctor. ! Ask your nurse or doctor about these medications HYDROcodone-acetaminophen 5-325 mg per tablet Discharge Instructions Dr. Green Beams Postoperative Instructions 1. Please maintain the dressing placed at surgery for 5 days. You may remove it in 5 days. Please keep the dressing clean and dry for 5 days. In 5 days you may get the wound wet and then cover it with a bandaid. If you have any questions or problems with your dressing, please call our office at [phone]. 
2. Please elevate the operative extremity to the level of the heart to keep swelling at a minimum. You may get up to move around, but when seated please keep the extremity elevated as much as possible. This will decrease swelling and postoperative pain. You may do activities as tolerated. 3. You may ice your arm/hand 5 times a day for 20 minutes at a time. 4. A prescription for pain medication is provided. The key to pain control is staying ahead of the pain. For the first 48-72 hours after your surgery, you may want to take your pain medication on a regular schedule. After that, you may only need it on an as needed basis. 5. Signs and symptoms of infection include: redness, increased pain, increased swelling not relieved by elevation, drainage, fever, or chills, If you develop any of these symptoms, call the office at [phone]. 6. Please Follow-up at my office 14 days after surgery or when specified at your pre-operative appointment. DISCHARGE SUMMARY from Nurse The following personal items are in your possession at time of discharge: 
 
Dental Appliances: None Jewelry: None Clothing:  (clothes in locker) PATIENT INSTRUCTIONS: 
 
 
F-face looks uneven A-arms unable to move or move unevenly S-speech slurred or non-existent T-time-call 911 as soon as signs and symptoms begin-DO NOT go Back to bed or wait to see if you get better-TIME IS BRAIN. Warning Signs of HEART ATTACK Call 911 if you have these symptoms: 
? Chest discomfort. Most heart attacks involve discomfort in the center of the chest that lasts more than a few minutes, or that goes away and comes back. It can feel like uncomfortable pressure, squeezing, fullness, or pain. ? Discomfort in other areas of the upper body. Symptoms can include pain or discomfort in one or both arms, the back, neck, jaw, or stomach. ? Shortness of breath with or without chest discomfort. ? Other signs may include breaking out in a cold sweat, nausea, or lightheadedness. Don't wait more than five minutes to call [address]! Fast action can save your life. Calling 911 is almost always the fastest way to get lifesaving treatment. Emergency Medical Services staff can begin treatment when they arrive  up to an hour sooner than if someone gets to the hospital by car. The discharge information has been reviewed with the patient and spouse. The patient and spouse verbalized understanding. Discharge medications reviewed with the patient and spouse and appropriate educational materials and side effects teaching were provided. Discharge Orders None Introducing hospitals & [facility] SERVICES! Dear Oscar Rocha: Thank you for requesting a [employer] account. Our records indicate that you have previously registered for a [employer] account but its currently inactive. Please call our [employer] support line at [phone]. Additional Information If you have questions, please visit the Frequently Asked Questions section of the [employer] website at [email or web address] Remember, MyChart is NOT to be used for urgent needs. For medical emergencies, dial 911. Now available from your iPhone and Android! General Information Please provide this summary of care documentation to your next provider. Patient Signature:  ____________________________________________________________ Date:  ____________________________________________________________  
  
Claudene Born Provider Signature:  ____________________________________________________________ Date:  ____________________________________________________________

## 2017-09-06 NOTE — ANESTHESIA PREPROCEDURE EVALUATION
Anesthetic History               Review of Systems / Medical History  Patient summary reviewed, nursing notes reviewed and pertinent labs reviewed    Pulmonary                   Neuro/Psych              Cardiovascular    Hypertension                Comments: SUMMARY:  Left ventricle: Systolic function was normal. Ejection fraction was  estimated in the range of 70 % to 75 %. There were no regional wall motion  abnormalities.    GI/Hepatic/Renal     GERD      Liver disease     Endo/Other    Diabetes         Other Findings                 Anesthetic Plan    ASA: 3  Anesthesia type: MAC          Induction: Intravenous

## 2017-09-06 NOTE — ANESTHESIA POSTPROCEDURE EVALUATION
Post-Anesthesia Evaluation and Assessment    Patient: Con Crowell MRN: [id number]  SSN: [id number]    YOB: 1955  Age: 57 y.o. Sex: female       Cardiovascular Function/Vital Signs  Visit Vitals    /66    Pulse 75    Temp 36.3 °C (97.4 °F)    Resp 18    Ht 5' 10\" (1.778 m)    Wt 86.2 kg (190 lb)    SpO2 93%    BMI 27.26 kg/m2       Patient is status post MAC anesthesia for Procedure(s):  LEFT INDEX FINGER AND RING FINGER TRIGGER RELEASE. Nausea/Vomiting: None    Postoperative hydration reviewed and adequate. Pain:  Pain Scale 1: Numeric (0 - 10) (09/06/17 0950)  Pain Intensity 1: 0 (09/06/17 0950)   Managed    Neurological Status:   Neuro (WDL): Within Defined Limits (09/06/17 0845)  Neuro  Neurologic State: Alert (09/06/17 0845)   At baseline    Mental Status and Level of Consciousness: Arousable    Pulmonary Status:   O2 Device: Room air (09/06/17 0950)   Adequate oxygenation and airway patent    Complications related to anesthesia: None    Post-anesthesia assessment completed.  No concerns    Signed By: Milagro Hendrickson MD     September 6, 2017

## 2017-09-18 DIAGNOSIS — I10 ESSENTIAL HYPERTENSION, BENIGN: ICD-10-CM

## 2017-09-18 RX ORDER — LOSARTAN POTASSIUM 100 MG/1
100 TABLET ORAL DAILY
Qty: 90 TAB | Refills: 2 | Status: SHIPPED | OUTPATIENT
Start: 2017-09-18 | End: 2017-10-24 | Stop reason: ALTCHOICE

## 2017-09-18 RX ORDER — ALPRAZOLAM 0.5 MG/1
0.5 TABLET ORAL
Qty: 45 TAB | Refills: 0 | Status: SHIPPED | OUTPATIENT
Start: 2017-09-18 | End: 2018-03-19 | Stop reason: SDUPTHER

## 2017-09-21 ENCOUNTER — CLINICAL SUPPORT (OUTPATIENT)
Dept: FAMILY MEDICINE CLINIC | Age: 62
End: 2017-09-21

## 2017-09-21 DIAGNOSIS — R30.0 DYSURIA: Primary | ICD-10-CM

## 2017-09-21 DIAGNOSIS — R19.7 DIARRHEA, UNSPECIFIED TYPE: ICD-10-CM

## 2017-09-21 RX ORDER — CIPROFLOXACIN 500 MG/1
500 TABLET ORAL 2 TIMES DAILY
Qty: 20 TAB | Refills: 0 | Status: SHIPPED | OUTPATIENT
Start: 2017-09-21 | End: 2017-09-28 | Stop reason: ALTCHOICE

## 2017-09-21 NOTE — MR AVS SNAPSHOT
Visit Information Date & Time Provider Department Dept. Phone Encounter #  
 9/21/2017  4:45 PM [facility] [phone] [id number] Your Appointments 9/21/2017  4:45 PM  
[facility] Appt Note: Urine [address] [address]  
[phone]  
  
   
 [address] [address]  
  
    
 9/28/2017  3:30 PM  
ROUTINE CARE with Garrett Melchor MD  
[facility] Appt Note: f/u diabetes cp0.00  
 [address]  [address] [facility] [address]  
[address]   
  
    
 2/16/2018  2:40 PM  
ESTABLISHED PATIENT with Miguel A Kilpatrick MD  
[address] [facility]) Appt Note: 6 mo f/u  
 [address] [facility] [address] [address] [phone]  
  
   
 [address] [place] [address] Upcoming Health Maintenance Date Due INFLUENZA AGE 9 TO ADULT 10/3/2017* DTaP/Tdap/Td series (1 - Tdap) 10/10/2017* EYE EXAM RETINAL OR DILATED Q1 10/18/2017* FOBT Q 1 YEAR AGE 50-75 10/19/2017* FOOT EXAM Q1 11/16/2017* HEMOGLOBIN A1C Q6M 2/1/2018 MICROALBUMIN Q1 8/1/2018 LIPID PANEL Q1 8/1/2018 BREAST CANCER SCRN MAMMOGRAM 8/17/2018 PAP AKA CERVICAL CYTOLOGY 9/16/2019 *Topic was postponed. The date shown is not the original due date. Allergies as of 9/21/2017  Review Complete On: 9/21/2017 By: MANJIT Otero Severity Noted Reaction Type Reactions Cephalosporins  08/19/2013    Unknown (comments) Told to avoid by doctor Codeine  08/19/2013    Vertigo Doxycycline  08/19/2013    Unknown (comments) Lodine [Etodolac]  11/27/2013    Hives Sweats, shakes Penicillins  08/19/2013    Hives Sulfa (Sulfonamide Antibiotics)  08/19/2013    Rash Current Immunizations  Reviewed on 12/15/2016 Name Date Hep A Vaccine (Adult) 6/2/2017  1:53 PM, 10/11/2016 Hep B Vaccine (Adult) 6/2/2017  1:54 PM, 12/15/2016, 10/11/2016 Influenza High Dose Vaccine PF 10/11/2016 Influenza Vaccine 10/14/2015 12:00 AM, 10/5/2015 12:00 AM  
 Influenza Vaccine (Quad) 10/14/2015  4:46 PM  
 Pneumococcal Conjugate (PCV-13) 3/16/2016 12:00 AM  
 Pneumococcal Polysaccharide (PPSV-23) 8/1/2013 Zoster Vaccine, Live 10/14/2014 12:00 AM  
  
 Not reviewed this visit You Were Diagnosed With   
  
 Codes Comments Dysuria    -  Primary ICD-10-CM: R30.0 ICD-9-CM: 788.1 Diarrhea, unspecified type     ICD-10-CM: R19.7 ICD-9-CM: 787.91 Vitals OB Status Smoking Status Postmenopausal Former Smoker Preferred Pharmacy Pharmacy Name Phone [address], [facility] [place] [address] [phone] Your Updated Medication List  
  
   
This list is accurate as of: 9/21/17  4:27 PM.  Always use your most recent med list.  
  
  
  
  
 ALPRAZolam 0.5 mg tablet Commonly known as:  Cecillia Matti Take 1 Tab by mouth nightly as needed for Anxiety or Sleep. Max Daily Amount: 0.5 mg. Indications: anxiety  
  
 aspirin 81 mg tablet Take 81 mg by mouth daily. calcium-vitamin D 500 mg(1,250mg) -200 unit per tablet Commonly known as:  OYSTER SHELL Take 1 Tab by mouth two (2) times daily (with meals). ciprofloxacin HCl 500 mg tablet Commonly known as:  CIPRO Take 1 Tab by mouth two (2) times a day for 10 days. dapsone 100 mg tablet Take 100 mg by mouth daily. fluticasone 50 mcg/actuation nasal spray Commonly known as:  FLONASE  
2 sprays in each nostril once a day. glucose blood VI test strips strip Commonly known as:  ASCENSIA AUTODISC VI, ONE TOUCH ULTRA TEST VI  
Use as directed.  DX E11.65  
  
 HumaLOG KwikPen 100 unit/mL kwikpen Generic drug:  insulin lispro INJECT 4 units SUBCUTANEOUSLY WITH MEALS AS DIRECTED HYDROcodone-acetaminophen 5-325 mg per tablet Commonly known as:  Mai Arts Take 1-2 Tabs by mouth every four (4) hours as needed for Pain. Max Daily Amount: 12 Tabs. ibuprofen 800 mg tablet Commonly known as:  MOTRIN  
TAKE ONE TABLET BY MOUTH EVERY 6 HOURS WITH FOOD AS NEEDED  Indications: OSTEOARTHRITIS  
  
 insulin detemir 100 unit/mL (3 mL) Inpn Commonly known as:  LEVEMIR FLEXPEN  
40 units sq bid  Indications: DIABETES MELLITUS WITH SEVERE INSULIN RESISTANCE  
  
 insulin syringe-needle U-100 1/2 mL 31 gauge x 15/64\" Syrg Commonly known as:  BD INSULIN SYRINGE ULTRA-FINE  
1 Units by Does Not Apply route daily as needed. DX E11.65  
  
 latanoprost 0.005 % ophthalmic solution Commonly known as:  Thao Fry Administer 1 Drop to both eyes nightly. losartan 100 mg tablet Commonly known as:  COZAAR Take 1 Tab by mouth daily. meclizine 12.5 mg tablet Commonly known as:  ANTIVERT Take 12.5 mg by mouth three (3) times daily as needed. TAKE 1 PILL A DAY PRN  
  
 metFORMIN  mg tablet Commonly known as:  GLUCOPHAGE XR Take 1 Tab by mouth two (2) times daily (with meals). multivitamin tablet Commonly known as:  ONE A DAY Take 1 Tab by mouth daily. mycophenolate 500 mg tablet Commonly known as:  CELLCEPT Take 1,000 mg by mouth 2 times daily. Do not chew or crush tablet Omega-3-DHA-EPA-Fish Oil 1,000 mg (120 mg-180 mg) Cap Take  by mouth. TAKE TWICE A DAY  
  
 omeprazole 40 mg capsule Commonly known as:  PRILOSEC Take 40 mg by mouth daily. simvastatin 40 mg tablet Commonly known as:  ZOCOR  
TAKE ONE TABLET BY MOUTH EVERY NIGHT  
  
 timolol maleate 0.5 % Drpd ophthalmic solution Administer 1 Drop to both eyes daily. venlafaxine 100 mg tablet Commonly known as:  [facility]  
 TAKE ONE TABLET BY MOUTH 2 TIMES A DAY Prescriptions Sent to Pharmacy Refills  
 ciprofloxacin HCl (CIPRO) 500 mg tablet 0 Sig: Take 1 Tab by mouth two (2) times a day for 10 days. Class: Normal  
 Pharmacy: [address], [address]  #: [phone] Route: Oral  
  
We Performed the Following AMB POC URINALYSIS DIP STICK MANUAL W/O MICRO [08873 CPT(R)] CULTURE, STOOL [id number] CPT(R)] [address] [facility] [place], [place], STOOL [63220 CPT(R)] Introducing [facility] & [facility] SERVICES! Dear Arvind Amezcua: Thank you for requesting a [employer] account. Our records indicate that you have previously registered for a Like[employer] account but its currently inactive. Please call our [employer] support line at [phone]. Additional Information If you have questions, please visit the Frequently Asked Questions section of the [employer] website at [email or web address] [employer]/"[email or web address]"/. Remember, [employer] is NOT to be used for urgent needs. For medical emergencies, dial 911. Now available from your iPhone and Android! Please provide this summary of care documentation to your next provider. Your primary care clinician is listed as Stewart Wang. If you have any questions after today's visit, please call [phone].

## 2017-09-21 NOTE — PROGRESS NOTES
HISTORY OF PRESENT ILLNESS  Nancy Barron is a 57 y.o. female. HPI presents today as a walk in with c/o dysuria and diarrhea. Dysuria x 1 week      Diarrhea over the past 6 weeks. 2-3  BM per day     Review of Systems   Constitutional: Negative for chills, fever and weight loss. Respiratory: Negative. Gastrointestinal: Positive for diarrhea. Genitourinary: Positive for dysuria and urgency. Physical Exam   Constitutional: No distress. Abdominal: Soft. There is tenderness in the epigastric area. Vitals reviewed. No results found for this visit on 09/21/17. ASSESSMENT and PLAN    ICD-10-CM ICD-9-CM    1. Dysuria R30.0 788.1 AMB POC URINALYSIS DIP STICK MANUAL W/O MICRO      ciprofloxacin HCl (CIPRO) 500 mg tablet   2.  Diarrhea, unspecified type R19.7 787.91 CULTURE, STOOL      GIARDIA LAMBLIA, AG, STOOL

## 2017-09-22 LAB
BILIRUB UR QL STRIP: NEGATIVE
GLUCOSE UR-MCNC: NEGATIVE MG/DL
KETONES P FAST UR STRIP-MCNC: NEGATIVE MG/DL
PH UR STRIP: 5.5 [PH] (ref 4.6–8)
PROT UR QL STRIP: NORMAL MG/DL
SP GR UR STRIP: 1.02 (ref 1–1.03)
UA UROBILINOGEN AMB POC: NORMAL (ref 0.2–1)
URINALYSIS CLARITY POC: CLEAR
URINALYSIS COLOR POC: YELLOW
URINE BLOOD POC: NORMAL
URINE LEUKOCYTES POC: NORMAL
URINE NITRITES POC: NEGATIVE

## 2017-09-25 LAB
CAMPYLOBACTER STL CULT: NORMAL
E COLI SXT STL QL IA: NEGATIVE
G LAMBLIA AG STL QL IA: NEGATIVE
SALM + SHIG STL CULT: NORMAL

## 2017-09-28 ENCOUNTER — OFFICE VISIT (OUTPATIENT)
Dept: FAMILY MEDICINE CLINIC | Age: 62
End: 2017-09-28

## 2017-09-28 ENCOUNTER — TELEPHONE (OUTPATIENT)
Dept: FAMILY MEDICINE CLINIC | Age: 62
End: 2017-09-28

## 2017-09-28 VITALS
BODY MASS INDEX: 27.77 KG/M2 | OXYGEN SATURATION: 97 % | WEIGHT: 194 LBS | HEIGHT: 70 IN | SYSTOLIC BLOOD PRESSURE: 171 MMHG | TEMPERATURE: 99.2 F | DIASTOLIC BLOOD PRESSURE: 97 MMHG | RESPIRATION RATE: 18 BRPM | HEART RATE: 77 BPM

## 2017-09-28 DIAGNOSIS — L03.114 CELLULITIS OF HAND, LEFT: Primary | ICD-10-CM

## 2017-09-28 RX ORDER — CLINDAMYCIN HYDROCHLORIDE 150 MG/1
150 CAPSULE ORAL 3 TIMES DAILY
Qty: 30 CAP | Refills: 0 | Status: SHIPPED | OUTPATIENT
Start: 2017-09-28 | End: 2017-10-08

## 2017-09-28 NOTE — TELEPHONE ENCOUNTER
Patient would like to be seen for a site infection that is red and sore. S/p hand surgery a few weeks ago. She'd rather not go back to [place] to have this checked if possible.

## 2017-09-28 NOTE — PROGRESS NOTES
Chief Complaint   Patient presents with    Wound Infection     left hand         HPI:         is a 57 y.o. female who notes the onset of a skin problem. The details are as follows: Underwent repair of trigger digits of left palm with Dr Yoselin Manley. Uneventful recovery until last week when she noted swelling and pain over the 2d and 3rd palmar areas of the left hand. Treated with cipro due to multiple drug allergies. No better. Immunocompromised on the basis of her use of cellcept for hypersensitivity pneumonitis. Allergies   Allergen Reactions    Cephalosporins Unknown (comments)     Told to avoid by doctor    Codeine Vertigo    Doxycycline Unknown (comments)    Lodine [Etodolac] Hives     Sweats, shakes    Penicillins Hives    Sulfa (Sulfonamide Antibiotics) Rash       Current Outpatient Prescriptions   Medication Sig    clindamycin (CLEOCIN) 150 mg capsule Take 1 Cap by mouth three (3) times daily for 10 days.  ALPRAZolam (XANAX) 0.5 mg tablet Take 1 Tab by mouth nightly as needed for Anxiety or Sleep. Max Daily Amount: 0.5 mg. Indications: anxiety    losartan (COZAAR) 100 mg tablet Take 1 Tab by mouth daily.  mycophenolate (CELLCEPT) 500 mg tablet Take 1,000 mg by mouth 2 times daily. Do not chew or crush tablet    dapsone 100 mg tablet Take 100 mg by mouth daily.  venlafaxine (EFFEXOR) 100 mg tablet TAKE ONE TABLET BY MOUTH 2 TIMES A DAY    omeprazole (PRILOSEC) 40 mg capsule Take 40 mg by mouth daily.  simvastatin (ZOCOR) 40 mg tablet TAKE ONE TABLET BY MOUTH EVERY NIGHT    ibuprofen (MOTRIN) 800 mg tablet TAKE ONE TABLET BY MOUTH EVERY 6 HOURS WITH FOOD AS NEEDED  Indications: OSTEOARTHRITIS    fluticasone (FLONASE) 50 mcg/actuation nasal spray 2 sprays in each nostril once a day.     insulin detemir (LEVEMIR FLEXPEN) 100 unit/mL (3 mL) inpn 40 units sq bid  Indications: DIABETES MELLITUS WITH SEVERE INSULIN RESISTANCE    metFORMIN ER (GLUCOPHAGE XR) 500 mg tablet Take 1 Tab by mouth two (2) times daily (with meals). (Patient taking differently: Take 1,000 mg by mouth two (2) times daily (with meals). )    insulin syringe-needle U-100 (BD INSULIN SYRINGE ULTRA-FINE) 1/2 mL 31 gauge x 15/64\" syrg 1 Units by Does Not Apply route daily as needed. DX E11.65    glucose blood VI test strips (ASCENSIA AUTODISC VI, ONE TOUCH ULTRA TEST VI) strip Use as directed. DX E11.65    HUMALOG KWIKPEN 100 unit/mL kwikpen INJECT 4 units SUBCUTANEOUSLY WITH MEALS AS DIRECTED    calcium-vitamin D (OYSTER SHELL) 500 mg(1,250mg) -200 unit per tablet Take 1 Tab by mouth two (2) times daily (with meals).  meclizine (ANTIVERT) 12.5 mg tablet Take 12.5 mg by mouth three (3) times daily as needed. TAKE 1 PILL A DAY PRN    timolol maleate 0.5 % DrpD ophthalmic solution Administer 1 Drop to both eyes daily.  latanoprost (XALATAN) 0.005 % ophthalmic solution Administer 1 Drop to both eyes nightly.  multivitamin (ONE A DAY) tablet Take 1 Tab by mouth daily.  Omega-3-DHA-EPA-Fish Oil 1,000 (120-180) mg cap Take  by mouth. TAKE TWICE A DAY    aspirin 81 mg tablet Take 81 mg by mouth daily.  HYDROcodone-acetaminophen (NORCO) 5-325 mg per tablet Take 1-2 Tabs by mouth every four (4) hours as needed for Pain. Max Daily Amount: 12 Tabs. No current facility-administered medications for this visit. Past Medical History:   Diagnosis Date    Colon polyps     Depression     Diverticulosis     Fatty liver 2009    due to steroids    GERD (gastroesophageal reflux disease)     Glaucoma     Interstitial lung disease (HCC)     Menopause     onset at age 49    Mild dysplasia of cervix 1984    Palpitations     Peripheral artery disease ([facility] [address]     stent behind left knee.     Pneumonia 2013    hospitalized for 8 days    Pneumonitis 2007    Type 2 diabetes mellitus ([facility] [address]     2007    Undiagnosed cardiac murmurs          ROS:  Denies fever, chills, cough, chest pain, SOB,  nausea, vomiting, or diarrhea. Denies wt loss, wt gain, hemoptysis, hematochezia or melena. Physical Examination:    Visit Vitals    BP (!) 171/97 (BP 1 Location: Left arm, BP Patient Position: Sitting)    Pulse 77    Temp 99.2 °F (37.3 °C) (Temporal)    Resp 18    Ht 5' 10\" (1.778 m)    Wt 194 lb (88 kg)    SpO2 97%    BMI 27.84 kg/m2      General:  Alert, cooperative, no distress. Head:  Normocephalic, without obvious abnormality, atraumatic. Eyes:  Conjunctivae/corneas clear. Pupils equal, round, reactive to light. Chest wall:  No tenderness or deformity. Extremities: Extremities normal, atraumatic, no cyanosis or edema. Skin:             Lymph nodes: Cervical and supraclavicular nodes are normal.   Neurologic: Moves all extremities, fluent speech         ASSESSMENT AND PLAN:    1. Stop Cipro  2. Start Clinda 150 TID for 10 days  3. Expect gradual reduction in swelling and pain over the next 5 days; IF NOT-->contact Dr Manzano Credaksha office for an appt. 4.  If there is not resolution of this problem by next week, see me Thursday or sooner if needed    Orders Placed This Encounter    clindamycin (CLEOCIN) 150 mg capsule     Sig: Take 1 Cap by mouth three (3) times daily for 10 days.      Dispense:  30 Cap     Refill:  0       Karyle Sprinkle, MD, [place]

## 2017-10-20 ENCOUNTER — CLINICAL SUPPORT (OUTPATIENT)
Dept: FAMILY MEDICINE CLINIC | Age: 62
End: 2017-10-20

## 2017-10-20 DIAGNOSIS — Z23 ENCOUNTER FOR IMMUNIZATION: Primary | ICD-10-CM

## 2017-10-20 NOTE — MR AVS SNAPSHOT
Visit Information Date & Time Provider Department Dept. Phone Encounter #  
 10/20/2017  2:15 PM [address] [place] [address] [address] [phone] [id number] Your Appointments 2/16/2018  2:40 PM  
ESTABLISHED PATIENT with Thu Anguiano MD  
[address] [facility] Appt Note: 6 mo f/u  
 [address] [id number] [phone]  
  
   
 [address] [place] [address] Upcoming Health Maintenance Date Due DTaP/Tdap/Td series (1 - Tdap) 10/14/1975 FOBT Q 1 YEAR AGE 50-75 10/14/2004 EYE EXAM RETINAL OR DILATED Q1 3/24/2017 INFLUENZA AGE 9 TO ADULT 8/1/2017 FOOT EXAM Q1 11/16/2017* HEMOGLOBIN A1C Q6M 2/1/2018 MICROALBUMIN Q1 8/1/2018 LIPID PANEL Q1 8/1/2018 BREAST CANCER SCRN MAMMOGRAM 8/17/2018 PAP AKA CERVICAL CYTOLOGY 9/16/2019 *Topic was postponed. The date shown is not the original due date. Allergies as of 10/20/2017  Review Complete On: 9/28/2017 By: Agnes Ring MD  
  
 Severity Noted Reaction Type Reactions Cephalosporins  08/19/2013    Unknown (comments) Told to avoid by doctor Codeine  08/19/2013    Vertigo Doxycycline  08/19/2013    Unknown (comments) Lodine [Etodolac]  11/27/2013    Hives Sweats, shakes Penicillins  08/19/2013    Hives Sulfa (Sulfonamide Antibiotics)  08/19/2013    Rash Current Immunizations  Reviewed on 12/15/2016 Name Date Hep A Vaccine (Adult) 6/2/2017  1:53 PM, 10/11/2016 Hep B Vaccine (Adult) 6/2/2017  1:54 PM, 12/15/2016, 10/11/2016 Influenza High Dose Vaccine PF 10/11/2016 Influenza Vaccine 10/14/2015 12:00 AM, 10/5/2015 12:00 AM  
 Influenza Vaccine (Quad) 10/14/2015  4:46 PM  
 Pneumococcal Conjugate (PCV-13) 3/16/2016 12:00 AM  
 Pneumococcal Polysaccharide (PPSV-23) 8/1/2013 Zoster Vaccine, Live 10/14/2014 12:00 AM  
  
 Not reviewed this visit Vitals OB Status Smoking Status Postmenopausal Former Smoker Your Updated Medication List  
  
   
This list is accurate as of: 10/20/17  2:36 PM.  Always use your most recent med list.  
  
  
  
  
 ALPRAZolam 0.5 mg tablet Commonly known as:  Nikki Fell Take 1 Tab by mouth nightly as needed for Anxiety or Sleep. Max Daily Amount: 0.5 mg. Indications: anxiety  
  
 aspirin 81 mg tablet Take 81 mg by mouth daily. calcium-vitamin D 500 mg(1,250mg) -200 unit per tablet Commonly known as:  OYSTER SHELL Take 1 Tab by mouth two (2) times daily (with meals). dapsone 100 mg tablet Take 100 mg by mouth daily. fluticasone 50 mcg/actuation nasal spray Commonly known as:  FLONASE  
2 sprays in each nostril once a day. glucose blood VI test strips strip Commonly known as:  ASCENSIA AUTODISC VI, ONE TOUCH ULTRA TEST VI  
Use as directed. DX E11.65 HumaLOG KwikPen 100 unit/mL kwikpen Generic drug:  insulin lispro INJECT 4 units SUBCUTANEOUSLY WITH MEALS AS DIRECTED HYDROcodone-acetaminophen 5-325 mg per tablet Commonly known as:  Jayy Alarcon Take 1-2 Tabs by mouth every four (4) hours as needed for Pain. Max Daily Amount: 12 Tabs. ibuprofen 800 mg tablet Commonly known as:  MOTRIN  
TAKE ONE TABLET BY MOUTH EVERY 6 HOURS WITH FOOD AS NEEDED  Indications: OSTEOARTHRITIS  
  
 insulin detemir 100 unit/mL (3 mL) Inpn Commonly known as:  LEVEMIR FLEXPEN  
40 units sq bid  Indications: Diabetes Mellitus with Severe Insulin Resistance  
  
 insulin syringe-needle U-100 1/2 mL 31 gauge x 15/64\" Syrg Commonly known as:  BD INSULIN SYRINGE ULTRA-FINE  
1 Units by Does Not Apply route daily as needed. DX E11.65  
  
 latanoprost 0.005 % ophthalmic solution Commonly known as:  Conley Nageotte Administer 1 Drop to both eyes nightly. losartan 100 mg tablet Commonly known as:  COZAAR Take 1 Tab by mouth daily. meclizine 12.5 mg tablet Commonly known as:  ANTIVERT Take 12.5 mg by mouth three (3) times daily as needed. TAKE 1 PILL A DAY PRN  
  
 metFORMIN  mg tablet Commonly known as:  GLUCOPHAGE XR Take 1 Tab by mouth two (2) times daily (with meals). multivitamin tablet Commonly known as:  ONE A DAY Take 1 Tab by mouth daily. mycophenolate 500 mg tablet Commonly known as:  CELLCEPT Take 1,000 mg by mouth 2 times daily. Do not chew or crush tablet Omega-3-DHA-EPA-Fish Oil 1,000 mg (120 mg-180 mg) Cap Take  by mouth. TAKE TWICE A DAY  
  
 omeprazole 40 mg capsule Commonly known as:  PRILOSEC Take 40 mg by mouth daily. simvastatin 40 mg tablet Commonly known as:  ZOCOR  
TAKE ONE TABLET BY MOUTH EVERY NIGHT  
  
 timolol maleate 0.5 % Drpd ophthalmic solution Administer 1 Drop to both eyes daily. venlafaxine 100 mg tablet Commonly known as:  EFFEXOR  
TAKE ONE TABLET BY MOUTH 2 TIMES A DAY Introducing [facility] SERVICES! Dear Aide Das: Thank you for requesting a [employer] account. Our records indicate that you have previously registered for a [employer] account but its currently inactive. Please call our [employer] support line at [phone]. Additional Information If you have questions, please visit the Frequently Asked Questions section of the [employer] website at [email or web address] Remember, [employer] is NOT to be used for urgent needs. For medical emergencies, dial 911. Now available from your iPhone and Android! Please provide this summary of care documentation to your next provider. Your primary care clinician is listed as Rory Ivey. If you have any questions after today's visit, please call [phone].

## 2017-10-24 ENCOUNTER — TELEPHONE (OUTPATIENT)
Dept: FAMILY MEDICINE CLINIC | Age: 62
End: 2017-10-24

## 2017-10-24 ENCOUNTER — OFFICE VISIT (OUTPATIENT)
Dept: FAMILY MEDICINE CLINIC | Age: 62
End: 2017-10-24

## 2017-10-24 VITALS
SYSTOLIC BLOOD PRESSURE: 147 MMHG | WEIGHT: 191 LBS | DIASTOLIC BLOOD PRESSURE: 81 MMHG | TEMPERATURE: 99.1 F | BODY MASS INDEX: 27.35 KG/M2 | RESPIRATION RATE: 16 BRPM | OXYGEN SATURATION: 96 % | HEART RATE: 79 BPM | HEIGHT: 70 IN

## 2017-10-24 DIAGNOSIS — E78.2 MIXED HYPERLIPIDEMIA: ICD-10-CM

## 2017-10-24 DIAGNOSIS — R19.5 LOOSE STOOLS: ICD-10-CM

## 2017-10-24 DIAGNOSIS — I10 ESSENTIAL HYPERTENSION, BENIGN: Primary | ICD-10-CM

## 2017-10-24 RX ORDER — ATORVASTATIN CALCIUM 40 MG/1
40 TABLET, FILM COATED ORAL DAILY
Qty: 90 TAB | Refills: 3 | Status: SHIPPED | OUTPATIENT
Start: 2017-10-24 | End: 2018-02-12 | Stop reason: SDUPTHER

## 2017-10-24 RX ORDER — VALSARTAN 320 MG/1
320 TABLET ORAL DAILY
Qty: 90 TAB | Refills: 3 | Status: SHIPPED | OUTPATIENT
Start: 2017-10-24 | End: 2018-02-12 | Stop reason: SDUPTHER

## 2017-10-24 NOTE — PROGRESS NOTES
Art Mckeon is a 60 y.o. female presenting for/with:    Diarrhea    HPI:  Diabetes. Sugars controlled a little too well per last A1c. Hypoglycemia: none that she's noticed  Tolerating current treatment moderately, getting loose stools with the metformin about every night around 2am.  Current medications include metformin ER 1g BID, humalog (plain) 4 units pre-meal PRN sugars >150, and levemir 40 units BID    Lab Results   Component Value Date/Time    Hemoglobin A1c 5.0 08/01/2017 10:13 AM    Hemoglobin A1c 11.6 09/08/2015 12:11 PM    Hemoglobin A1c 8.0 12/29/2014 07:38 AM    Glucose 153 08/01/2017 10:13 AM    Glucose (POC) 167 09/06/2017 08:10 AM    Microalb/Creat ratio (ug/mg creat.) 10.4 08/01/2017 10:13 AM    LDL, calculated 104 08/01/2017 10:13 AM    Creatinine 0.74 08/01/2017 10:13 AM     Lab Results   Component Value Date/Time    Microalb/Creat ratio (ug/mg creat.) 10.4 08/01/2017 10:13 AM     Hypertension. Blood pressures have been up and down. Management at last visit included continuing current regimen . Current regimen: angiotensin II receptor antagonist. Symptoms include no symptoms. Patient denies chest pain, palpitations, peripheral edema. Lab review:   Lab Results   Component Value Date/Time    Sodium 140 08/01/2017 10:13 AM    Potassium 5.2 08/01/2017 10:13 AM    Chloride 98 08/01/2017 10:13 AM    CO2 25 08/01/2017 10:13 AM    Glucose 153 08/01/2017 10:13 AM    BUN 20 08/01/2017 10:13 AM    Creatinine 0.74 08/01/2017 10:13 AM    BUN/Creatinine ratio 27 08/01/2017 10:13 AM    GFR est  08/01/2017 10:13 AM    GFR est non-AA 87 08/01/2017 10:13 AM    Calcium 10.3 08/01/2017 10:13 AM     Hyperlipidemia. On zocor 40 and ASA 81mg qd. Conor well. No myalgias, arthralgias, unusual weakness.   Lab Results   Component Value Date/Time    Cholesterol, total 210 08/01/2017 10:13 AM    HDL Cholesterol 66 08/01/2017 10:13 AM    LDL, calculated 104 08/01/2017 10:13 AM    VLDL, calculated 40 08/01/2017 10:13 AM    Triglyceride 200 08/01/2017 10:13 AM     Lab Results   Component Value Date/Time    ALT (SGPT) 68 12/29/2014 07:38 AM    AST (SGOT) 83 09/08/2015 12:11 PM    Alk. phosphatase 43 12/29/2014 07:38 AM    Bilirubin, total 0.3 12/29/2014 07:38 AM     PMH, SH, Medications/Allergies: reviewed, on chart. ROS:  Constitutional: No fever, chills or weight loss  Respiratory: No cough, SOB   CV: No chest pain or Palpitations    Visit Vitals    /81 (BP 1 Location: Left arm, BP Patient Position: Sitting)    Pulse 79    Temp 99.1 °F (37.3 °C) (Temporal)    Resp 16    Ht 5' 10\" (1.778 m)    Wt 191 lb (86.6 kg)    SpO2 96%    BMI 27.41 kg/m2     Wt Readings from Last 3 Encounters:   10/24/17 191 lb (86.6 kg)   09/28/17 194 lb (88 kg)   09/06/17 190 lb (86.2 kg)   -3#    BP Readings from Last 3 Encounters:   10/24/17 147/81   09/28/17 (!) 171/97   09/06/17 136/66     Physical Examination: General appearance - alert, well appearing, and in no distress  Mental status - alert, oriented to person, place, and time  Eyes - pupils equal and reactive, extraocular eye movements intact  ENT - bilateral external ears and nose normal. Normal lips  Neck - supple, no significant adenopathy, no thyromegaly or mass  Lymphatics - no palpable lymphadenopathy, no hepatosplenomegaly  Chest - clear to auscultation, no wheezes, rales or rhonchi, symmetric air entry  Heart - normal rate, regular rhythm, normal S1, S2, no murmurs, rubs, clicks or gallops  Extremities - peripheral pulses normal, no pedal edema, no clubbing or cyanosis    A/P:  DM2  well controlled, but having loose stool in AM. Try shifting metformin ER to 2g QAM. D/C humalog for now due to low A1c on summer check. Plan recheck A1c, CMP at f/u. HTN  Not in goal. Change losartan to diovan 320mg every day, should work better. HLD  Not in goal, but close. Try change to lipitor 40 every day. Plan labs at f/u.

## 2017-10-24 NOTE — TELEPHONE ENCOUNTER
Offered patient appointment with Dr Radha Meyer today at 21 [phone]. She has accepted appointment.

## 2017-10-24 NOTE — MR AVS SNAPSHOT
Visit Information Date & Time Provider Department Dept. Phone Encounter #  
 10/24/2017 10:50 AM Janann Gitelman, MD [address] [place] [address] [phone] [id number] Follow-up Instructions Return in about 3 months (around 1/24/2018). Follow-up and Disposition History Your Appointments 2/16/2018  2:40 PM  
ESTABLISHED PATIENT with Idalia Yan MD  
[address] [facility] [place] [facility]) Appt Note: 6 mo f/u  
 [address] [facility] [address] [phone]  
  
   
 [address] [facility] Upcoming Health Maintenance Date Due DTaP/Tdap/Td series (1 - Tdap) 10/14/1975 FOBT Q 1 YEAR AGE 50-75 10/14/2004 EYE EXAM RETINAL OR DILATED Q1 3/24/2017 FOOT EXAM Q1 11/16/2017* HEMOGLOBIN A1C Q6M 2/1/2018 MICROALBUMIN Q1 8/1/2018 LIPID PANEL Q1 8/1/2018 BREAST CANCER SCRN MAMMOGRAM 8/17/2018 PAP AKA CERVICAL CYTOLOGY 9/16/2019 *Topic was postponed. The date shown is not the original due date. Allergies as of 10/24/2017  Review Complete On: 10/24/2017 By: Janann Gitelman, MD  
  
 Severity Noted Reaction Type Reactions Cephalosporins  08/19/2013    Unknown (comments) Told to avoid by doctor Codeine  08/19/2013    Vertigo Doxycycline  08/19/2013    Unknown (comments) Lodine [Etodolac]  11/27/2013    Hives Sweats, shakes Penicillins  08/19/2013    Hives Pt states that she is not allergic. Sulfa (Sulfonamide Antibiotics)  08/19/2013    Rash Current Immunizations  Reviewed on 12/15/2016 Name Date Hep A Vaccine (Adult) 6/2/2017  1:53 PM, 10/11/2016 Hep B Vaccine (Adult) 6/2/2017  1:54 PM, 12/15/2016, 10/11/2016 Influenza High Dose Vaccine PF 10/11/2016 Influenza Vaccine 10/14/2015 12:00 AM, 10/5/2015 12:00 AM  
 Influenza Vaccine (Quad) 10/14/2015  4:46 PM  
 Influenza Vaccine (Quad) PF 10/20/2017 Pneumococcal Conjugate (PCV-13) 3/16/2016 12:00 AM  
 Pneumococcal Polysaccharide (PPSV-23) 8/1/2013 Zoster Vaccine, Live 10/14/2014 12:00 AM  
  
 Not reviewed this visit You Were Diagnosed With   
  
 Codes Comments Essential hypertension, benign    -  Primary ICD-10-CM: I10 
ICD-9-CM: 401.1 Mixed hyperlipidemia     ICD-10-CM: E78.2 ICD-9-CM: 272.2 Uncontrolled type 2 diabetes mellitus without complication, with long-term current use of insulin (HCC)     ICD-10-CM: E11.65, Z79.4 ICD-9-CM: 250.02, V58.67 Loose stools     ICD-10-CM: R19.5 ICD-9-CM: 228. 7 Vitals BP Pulse Temp Resp Height(growth percentile) 147/81 (BP 1 Location: Left arm, BP Patient Position: Sitting) 79 99.1 °F (37.3 °C) (Temporal) 16 5' 10\" (1.778 m) Weight(growth percentile) SpO2 BMI OB Status Smoking Status 191 lb (86.6 kg) 96% 27.41 kg/m2 Postmenopausal Former Smoker Vitals History BMI and BSA Data Body Mass Index Body Surface Area  
 27.41 kg/m 2 2.07 m 2 Preferred Pharmacy Pharmacy Name Phone [address], [facility] El[facility] Padilla [phone] Your Updated Medication List  
  
   
This list is accurate as of: 10/24/17 11:40 AM.  Always use your most recent med list.  
  
  
  
  
 ALPRAZolam 0.5 mg tablet Commonly known as:  Wilson Leaks Take 1 Tab by mouth nightly as needed for Anxiety or Sleep. Max Daily Amount: 0.5 mg. Indications: anxiety  
  
 aspirin 81 mg tablet Take 81 mg by mouth daily. atorvastatin 40 mg tablet Commonly known as:  LIPITOR Take 1 Tab by mouth daily. Indications: cholesterol and heart, replaces simastatin  
  
 calcium-vitamin D 500 mg(1,250mg) -200 unit per tablet Commonly known as:  OYSTER SHELL Take 1 Tab by mouth two (2) times daily (with meals). dapsone 100 mg tablet Take 100 mg by mouth daily. fluticasone 50 mcg/actuation nasal spray Commonly known as:  Scotn Betsey 2 sprays in each nostril once a day. glucose blood VI test strips strip Commonly known as:  ASCENSIA AUTODISC VI, ONE TOUCH ULTRA TEST VI  
Use as directed. DX E11.65 HYDROcodone-acetaminophen 5-325 mg per tablet Commonly known as:  Lynnetta Lopez Take 1-2 Tabs by mouth every four (4) hours as needed for Pain. Max Daily Amount: 12 Tabs. ibuprofen 800 mg tablet Commonly known as:  MOTRIN  
TAKE ONE TABLET BY MOUTH EVERY 6 HOURS WITH FOOD AS NEEDED  Indications: OSTEOARTHRITIS  
  
 insulin detemir 100 unit/mL (3 mL) Inpn Commonly known as:  LEVEMIR FLEXPEN  
40 units sq bid  Indications: Diabetes Mellitus with Severe Insulin Resistance  
  
 insulin syringe-needle U-100 1/2 mL 31 gauge x 15/64\" Syrg Commonly known as:  BD INSULIN SYRINGE ULTRA-FINE  
1 Units by Does Not Apply route daily as needed. DX E11.65  
  
 latanoprost 0.005 % ophthalmic solution Commonly known as:  Deyanne Pace Administer 1 Drop to both eyes nightly. meclizine 12.5 mg tablet Commonly known as:  ANTIVERT Take 12.5 mg by mouth three (3) times daily as needed. TAKE 1 PILL A DAY PRN  
  
 metFORMIN  mg tablet Commonly known as:  GLUCOPHAGE XR Take 1 Tab by mouth two (2) times daily (with meals). multivitamin tablet Commonly known as:  ONE A DAY Take 1 Tab by mouth daily. mycophenolate 500 mg tablet Commonly known as:  CELLCEPT Take 1,000 mg by mouth 2 times daily. Do not chew or crush tablet Omega-3-DHA-EPA-Fish Oil 1,000 mg (120 mg-180 mg) Cap Take  by mouth. TAKE TWICE A DAY  
  
 omeprazole 40 mg capsule Commonly known as:  PRILOSEC Take 40 mg by mouth daily. timolol maleate 0.5 % Drpd ophthalmic solution Administer 1 Drop to both eyes daily. valsartan 320 mg tablet Commonly known as:  DIOVAN Take 1 Tab by mouth daily. Indications: pressure and kidney, replaces losartan  
  
 venlafaxine 100 mg tablet Commonly known as:  DONNELL [facility]  
 TAKE ONE TABLET BY MOUTH 2 TIMES A DAY Prescriptions Sent to Pharmacy Refills  
 atorvastatin (LIPITOR) 40 mg tablet 3 Sig: Take 1 Tab by mouth daily. Indications: cholesterol and heart, replaces simastatin Class: Normal  
 Pharmacy: [address], [address] Ph #: [phone] Route: Oral  
 valsartan (DIOVAN) 320 mg tablet 3 Sig: Take 1 Tab by mouth daily. Indications: pressure and kidney, replaces losartan Class: Normal  
 Pharmacy: [address], [address] Ph #: [phone] Route: Oral  
  
We Performed the Following HEMOGLOBIN A1C WITH EAG [49699 CPT(R)] METABOLIC PANEL, COMPREHENSIVE [39171 CPT(R)] Follow-up Instructions Return in about 3 months (around 1/24/2018). Patient Instructions We're changing your simvastatin to atorvastatin and your losartan to valsartan. Introducing [facility] SERVICES! Dear José Button: Thank you for requesting a [employer] account. Our records indicate that you have previously registered for a [employer] account but its currently inactive. Please call our [employer] support line at [phone]. Additional Information If you have questions, please visit the Frequently Asked Questions section of the [employer] website at [email or web address]. Remember, [employer] is NOT to be used for urgent needs. For medical emergencies, dial 911. Now available from your iPhone and Android! Please provide this summary of care documentation to your next provider. Your primary care clinician is listed as Caridad Palomo. If you have any questions after today's visit, please call [phone].

## 2017-10-25 LAB
ALBUMIN SERPL-MCNC: 4.8 G/DL (ref 3.6–4.8)
ALBUMIN/GLOB SERPL: 2.1 {RATIO} (ref 1.2–2.2)
ALP SERPL-CCNC: 50 IU/L (ref 39–117)
ALT SERPL-CCNC: 58 IU/L (ref 0–32)
AST SERPL-CCNC: 46 IU/L (ref 0–40)
BILIRUB SERPL-MCNC: 0.6 MG/DL (ref 0–1.2)
BUN SERPL-MCNC: 12 MG/DL (ref 8–27)
BUN/CREAT SERPL: 19 (ref 12–28)
CALCIUM SERPL-MCNC: 10.6 MG/DL (ref 8.7–10.3)
CHLORIDE SERPL-SCNC: 97 MMOL/L (ref 96–106)
CO2 SERPL-SCNC: 27 MMOL/L (ref 18–29)
CREAT SERPL-MCNC: 0.63 MG/DL (ref 0.57–1)
EST. AVERAGE GLUCOSE BLD GHB EST-MCNC: 143 MG/DL
GFR SERPLBLD CREATININE-BSD FMLA CKD-EPI: 110 ML/MIN/1.73
GFR SERPLBLD CREATININE-BSD FMLA CKD-EPI: 96 ML/MIN/1.73
GLOBULIN SER CALC-MCNC: 2.3 G/DL (ref 1.5–4.5)
GLUCOSE SERPL-MCNC: 159 MG/DL (ref 65–99)
HBA1C MFR BLD: 6.6 % (ref 4.8–5.6)
POTASSIUM SERPL-SCNC: 4.4 MMOL/L (ref 3.5–5.2)
PROT SERPL-MCNC: 7.1 G/DL (ref 6–8.5)
SODIUM SERPL-SCNC: 143 MMOL/L (ref 134–144)

## 2017-10-25 RX ORDER — DAPSONE 100 MG/1
TABLET ORAL
Qty: 30 TAB | Refills: 11 | Status: SHIPPED | OUTPATIENT
Start: 2017-10-25 | End: 2017-11-21 | Stop reason: SDUPTHER

## 2017-10-30 RX ORDER — INSULIN LISPRO 100 [IU]/ML
INJECTION, SOLUTION INTRAVENOUS; SUBCUTANEOUS
Qty: 15 ML | Refills: 0 | Status: SHIPPED | OUTPATIENT
Start: 2017-10-30

## 2017-10-31 ENCOUNTER — TELEPHONE (OUTPATIENT)
Dept: FAMILY MEDICINE CLINIC | Age: 62
End: 2017-10-31

## 2017-10-31 DIAGNOSIS — J01.00 ACUTE NON-RECURRENT MAXILLARY SINUSITIS: Primary | ICD-10-CM

## 2017-10-31 DIAGNOSIS — Z79.4 CONTROLLED TYPE 2 DIABETES MELLITUS WITHOUT COMPLICATION, WITH LONG-TERM CURRENT USE OF INSULIN (HCC): ICD-10-CM

## 2017-10-31 DIAGNOSIS — E11.9 CONTROLLED TYPE 2 DIABETES MELLITUS WITHOUT COMPLICATION, WITH LONG-TERM CURRENT USE OF INSULIN (HCC): ICD-10-CM

## 2017-10-31 RX ORDER — AMOXICILLIN 500 MG/1
500 CAPSULE ORAL 2 TIMES DAILY
Qty: 14 CAP | Refills: 0 | Status: SHIPPED | OUTPATIENT
Start: 2017-10-31 | End: 2017-11-07

## 2017-10-31 NOTE — TELEPHONE ENCOUNTER
Patient says the change in her insulin by Dr. Erik Santana has helped her diarrhea but her sugars are still around 200. She also says her cold has become worse with productive yellow-green mucous which had been clear.

## 2017-10-31 NOTE — TELEPHONE ENCOUNTER
Contacted pt. Having some nasal mucus. sinus pressure. No shaking chills. Lungs a little worse. No heme. Normal exercise tolerance. Sx gradually worsening over past 10 days. Will call in amoxil 500 BID x7d to [facility].

## 2017-10-31 NOTE — TELEPHONE ENCOUNTER
Spoke with patient. Unsure if she has a fever or not does not own a thermometer. Positive for chills,some sob and sinus pressure with pain and pressure in ears. Has not tried any OTC med does not like to use those. Has used pain reliever without relief. She is concerned about elevated blood sugar.

## 2017-11-14 ENCOUNTER — TELEPHONE (OUTPATIENT)
Dept: FAMILY MEDICINE CLINIC | Age: 62
End: 2017-11-14

## 2017-11-14 ENCOUNTER — CLINICAL SUPPORT (OUTPATIENT)
Dept: FAMILY MEDICINE CLINIC | Age: 62
End: 2017-11-14

## 2017-11-14 DIAGNOSIS — R82.90 URINE ABNORMALITY: Primary | ICD-10-CM

## 2017-11-14 DIAGNOSIS — R82.90 ABNORMAL FINDING ON URINALYSIS: Primary | ICD-10-CM

## 2017-11-14 LAB
BILIRUB UR QL STRIP: NEGATIVE
GLUCOSE UR-MCNC: NORMAL MG/DL
KETONES P FAST UR STRIP-MCNC: NEGATIVE MG/DL
PH UR STRIP: 6 [PH] (ref 4.6–8)
PROT UR QL STRIP: NORMAL
SP GR UR STRIP: 1.02 (ref 1–1.03)
UA UROBILINOGEN AMB POC: NORMAL (ref 0.2–1)
URINALYSIS CLARITY POC: NORMAL
URINALYSIS COLOR POC: YELLOW
URINE BLOOD POC: NORMAL
URINE LEUKOCYTES POC: NORMAL
URINE NITRITES POC: NEGATIVE

## 2017-11-14 RX ORDER — NITROFURANTOIN 25; 75 MG/1; MG/1
100 CAPSULE ORAL 2 TIMES DAILY
Qty: 14 CAP | Refills: 0 | Status: SHIPPED | OUTPATIENT
Start: 2017-11-14 | End: 2017-11-18

## 2017-11-14 NOTE — TELEPHONE ENCOUNTER
Spoke with patient on telephone. HX Frequent UTIs . Having  frequent urination every 15 minutes. Dropped off a UA to the office this  with 500  glucose and large blood.  this morning. Patient followed by endocrinologist.  Who is adjusting her meds. Will update. Discussed frequent UTIs agreed for referral to urologist Dr. Carl Hicks in [place]. Please send endocrinologist copy of labs and UA results.

## 2017-11-14 NOTE — MR AVS SNAPSHOT
Visit Information Date & Time Provider Department Dept. Phone Encounter #  
 11/14/2017 10:00 AM [facility] [address] [facility] [phone] [id number] Your Appointments 2/16/2018  2:40 PM  
ESTABLISHED PATIENT with Mendel Maki MD  
[address] Ronnie Carvalho - [facility]) Appt Note: 6 mo f/u  
 [address] [facility] [id number] [phone]  
  
   
 [address] [place] [address] [facility] Upcoming Health Maintenance Date Due DTaP/Tdap/Td series (1 - Tdap) 10/14/1975 FOBT Q 1 YEAR AGE 50-75 10/14/2004 EYE EXAM RETINAL OR DILATED Q1 3/24/2017 FOOT EXAM Q1 11/16/2017* HEMOGLOBIN A1C Q6M 4/24/2018 LIPID PANEL Q1 8/1/2018 BREAST CANCER SCRN MAMMOGRAM 8/17/2018 MICROALBUMIN Q1 11/10/2018 PAP AKA CERVICAL CYTOLOGY 9/16/2019 *Topic was postponed. The date shown is not the original due date. Allergies as of 11/14/2017  Review Complete On: 10/31/2017 By: Idelle Felty, MD  
  
 Severity Noted Reaction Type Reactions Codeine  08/19/2013    Vertigo Doxycycline  08/19/2013    Unknown (comments) Lodine [Etodolac]  11/27/2013    Hives Sweats, shakes Sulfa (Sulfonamide Antibiotics)  08/19/2013    Rash Current Immunizations  Reviewed on 12/15/2016 Name Date Hep A Vaccine (Adult) 6/2/2017  1:53 PM, 10/11/2016 Hep B Vaccine (Adult) 6/2/2017  1:54 PM, 12/15/2016, 10/11/2016 Influenza High Dose Vaccine PF 10/11/2016 Influenza Vaccine 10/14/2015 12:00 AM, 10/5/2015 12:00 AM  
 Influenza Vaccine (Quad) 10/14/2015  4:46 PM  
 Influenza Vaccine (Quad) PF 10/20/2017 Pneumococcal Conjugate (PCV-13) 3/16/2016 12:00 AM  
 Pneumococcal Polysaccharide (PPSV-23) 8/1/2013 Zoster Vaccine, Live 10/14/2014 12:00 AM  
  
 Not reviewed this visit Vitals OB Status Smoking Status Postmenopausal Former Smoker Your Updated Medication List  
  
   
This list is accurate as of: 11/14/17 10:32 AM.  Always use your most recent med list.  
  
  
  
  
 ALPRAZolam 0.5 mg tablet Commonly known as:  Waldo Catherine Take 1 Tab by mouth nightly as needed for Anxiety or Sleep. Max Daily Amount: 0.5 mg. Indications: anxiety  
  
 aspirin 81 mg tablet Take 81 mg by mouth daily. atorvastatin 40 mg tablet Commonly known as:  LIPITOR Take 1 Tab by mouth daily. Indications: cholesterol and heart, replaces simastatin  
  
 calcium-vitamin D 500 mg(1,250mg) -200 unit per tablet Commonly known as:  OYSTER SHELL Take 1 Tab by mouth two (2) times daily (with meals). dapsone 100 mg tablet Take 100 mg by mouth daily. fluticasone 50 mcg/actuation nasal spray Commonly known as:  FLONASE  
2 sprays in each nostril once a day. glucose blood VI test strips strip Commonly known as:  ASCENSIA AUTODISC VI, ONE TOUCH ULTRA TEST VI  
Use as directed. DX E11.65 HumaLOG KwikPen 100 unit/mL kwikpen Generic drug:  insulin lispro INJECT 4 units SUBCUTANEOUSLY WITH MEALS AS DIRECTED HYDROcodone-acetaminophen 5-325 mg per tablet Commonly known as:  Don Dolphin Take 1-2 Tabs by mouth every four (4) hours as needed for Pain. Max Daily Amount: 12 Tabs. ibuprofen 800 mg tablet Commonly known as:  MOTRIN  
TAKE ONE TABLET BY MOUTH EVERY 6 HOURS WITH FOOD AS NEEDED  Indications: OSTEOARTHRITIS  
  
 insulin detemir 100 unit/mL (3 mL) Inpn Commonly known as:  LEVEMIR FLEXPEN  
40 units am and 42 units pm  Indications: Diabetes Mellitus with Severe Insulin Resistance  
  
 insulin syringe-needle U-100 1/2 mL 31 gauge x 15/64\" Syrg Commonly known as:  BD INSULIN SYRINGE ULTRA-FINE  
1 Units by Does Not Apply route daily as needed. DX E11.65  
  
 latanoprost 0.005 % ophthalmic solution Commonly known as:  Priya Morgan Administer 1 Drop to both eyes nightly. meclizine 12.5 mg tablet Commonly known as:  ANTIVERT Take 12.5 mg by mouth three (3) times daily as needed. TAKE 1 PILL A DAY PRN  
  
 metFORMIN  mg tablet Commonly known as:  GLUCOPHAGE XR Take 1 Tab by mouth two (2) times daily (with meals). multivitamin tablet Commonly known as:  ONE A DAY Take 1 Tab by mouth daily. mycophenolate 500 mg tablet Commonly known as:  CELLCEPT Take 1,000 mg by mouth 2 times daily. Do not chew or crush tablet  
  
 nitrofurantoin (macrocrystal-monohydrate) 100 mg capsule Commonly known as:  MACROBID Take 1 Cap by mouth two (2) times a day. Omega-3-DHA-EPA-Fish Oil 1,000 mg (120 mg-180 mg) Cap Take  by mouth. TAKE TWICE A DAY  
  
 omeprazole 40 mg capsule Commonly known as:  PRILOSEC Take 40 mg by mouth daily. timolol maleate 0.5 % Drpd ophthalmic solution Administer 1 Drop to both eyes daily. valsartan 320 mg tablet Commonly known as:  DIOVAN Take 1 Tab by mouth daily. Indications: pressure and kidney, replaces losartan  
  
 venlafaxine 100 mg tablet Commonly known as:  EFFEXOR  
TAKE ONE TABLET BY MOUTH 2 TIMES A DAY Introducing [facility]! Dear Carlos Raygoza: Thank you for requesting a [employer] account. Our records indicate that you have previously registered for a [employer] account but its currently inactive. Please call our [employer] support line at [phone]. Additional Information If you have questions, please visit the Frequently Asked Questions section of the [employer] website at [email or web address] Remember, [employer] is NOT to be used for urgent needs. For medical emergencies, dial 911. Now available from your iPhone and Android! Please provide this summary of care documentation to your next provider. Your primary care clinician is listed as Demetria Frausto. If you have any questions after today's visit, please call [phone].

## 2017-11-14 NOTE — TELEPHONE ENCOUNTER
Contacted patient from where a U/A was dropped off this morning with no orders,contacted patient to state that we would need to see her today and gave her an appointment @ 1. Patient stated that she was not coming in to be seen that she only wanted an RX to be called into the pharmacy. Patient would would like for you to give her a call.

## 2017-11-14 NOTE — PROGRESS NOTES
Urine culture pending  Referral to [facility] for frequent UTI  Please send results to endocrinologist  Started on macrobid

## 2017-11-17 LAB — BACTERIA UR CULT: ABNORMAL

## 2017-11-18 RX ORDER — CIPROFLOXACIN 500 MG/1
500 TABLET ORAL 2 TIMES DAILY
Qty: 20 TAB | Refills: 0 | Status: SHIPPED | OUTPATIENT
Start: 2017-11-18 | End: 2017-11-28

## 2017-11-20 RX ORDER — OMEPRAZOLE 40 MG/1
CAPSULE, DELAYED RELEASE ORAL
Qty: 90 CAP | Refills: 1 | Status: SHIPPED | OUTPATIENT
Start: 2017-11-20

## 2017-11-20 NOTE — TELEPHONE ENCOUNTER
RX on Saturday went to the wrong pharmacy. RX needs to be sent to [facility]. Please switch.

## 2017-11-22 ENCOUNTER — TELEPHONE (OUTPATIENT)
Dept: FAMILY MEDICINE CLINIC | Age: 62
End: 2017-11-22

## 2017-11-22 DIAGNOSIS — J84.10 POSTINFLAMMATORY PULMONARY FIBROSIS (HCC): Primary | ICD-10-CM

## 2017-11-22 RX ORDER — DAPSONE 100 MG/1
TABLET ORAL
Qty: 90 TAB | Refills: 3 | Status: SHIPPED | OUTPATIENT
Start: 2017-11-22 | End: 2018-03-05 | Stop reason: SDUPTHER

## 2017-12-13 RX ORDER — MYCOPHENOLATE MOFETIL 500 MG/1
TABLET ORAL
Qty: 360 TAB | Refills: 3 | Status: SHIPPED | OUTPATIENT
Start: 2017-12-13

## 2017-12-21 ENCOUNTER — OFFICE VISIT (OUTPATIENT)
Dept: FAMILY MEDICINE CLINIC | Age: 62
End: 2017-12-21

## 2017-12-21 VITALS
BODY MASS INDEX: 28.03 KG/M2 | OXYGEN SATURATION: 93 % | HEIGHT: 70 IN | HEART RATE: 92 BPM | SYSTOLIC BLOOD PRESSURE: 130 MMHG | RESPIRATION RATE: 24 BRPM | WEIGHT: 195.8 LBS | TEMPERATURE: 98 F | DIASTOLIC BLOOD PRESSURE: 80 MMHG

## 2017-12-21 DIAGNOSIS — L08.9 SKIN INFECTION: Primary | ICD-10-CM

## 2017-12-21 RX ORDER — IBUPROFEN 800 MG/1
TABLET ORAL
Qty: 90 TAB | Refills: 0 | Status: SHIPPED | OUTPATIENT
Start: 2017-12-21 | End: 2018-08-14 | Stop reason: SDUPTHER

## 2017-12-21 RX ORDER — CLINDAMYCIN HYDROCHLORIDE 150 MG/1
150 CAPSULE ORAL 3 TIMES DAILY
Qty: 30 CAP | Refills: 0 | Status: SHIPPED | OUTPATIENT
Start: 2017-12-21 | End: 2017-12-31

## 2017-12-21 RX ORDER — INSULIN LISPRO 100 [IU]/ML
INJECTION, SOLUTION SUBCUTANEOUS
COMMUNITY
End: 2018-09-19 | Stop reason: ALTCHOICE

## 2017-12-21 NOTE — MR AVS SNAPSHOT
Visit Information Date & Time Provider Department Dept. Phone Encounter #  
 12/21/2017  2:30 PM Kimi Barron NP [address] [phone] [id number] Your Appointments 2/16/2018  2:40 PM  
ESTABLISHED PATIENT with Mariam Kim MD  
[address] Ronnie Seanor - [facility]) Appt Note: 6 mo f/u  
 [address] [facility] [phone]  
  
   
 [address] [place] [address] Upcoming Health Maintenance Date Due DTaP/Tdap/Td series (1 - Tdap) 10/14/1975 FOBT Q 1 YEAR AGE 50-75 10/14/2004 EYE EXAM RETINAL OR DILATED Q1 3/24/2017 FOOT EXAM Q1 7/18/2017 HEMOGLOBIN A1C Q6M 6/15/2018 LIPID PANEL Q1 8/1/2018 MICROALBUMIN Q1 11/14/2018 PAP AKA CERVICAL CYTOLOGY 9/16/2019 Allergies as of 12/21/2017  Review Complete On: 12/21/2017 By: Jolena Ganser, RN Severity Noted Reaction Type Reactions Codeine  08/19/2013    Vertigo Doxycycline  08/19/2013    Unknown (comments) Lodine [Etodolac]  11/27/2013    Hives Sweats, shakes Sulfa (Sulfonamide Antibiotics)  08/19/2013    Rash Current Immunizations  Reviewed on 12/15/2016 Name Date Hep A Vaccine (Adult) 6/2/2017  1:53 PM, 10/11/2016 Hep B Vaccine (Adult) 6/2/2017  1:54 PM, 12/15/2016, 10/11/2016 Influenza High Dose Vaccine PF 10/11/2016 Influenza Vaccine 10/14/2015 12:00 AM, 10/5/2015 12:00 AM  
 Influenza Vaccine (Quad) 10/14/2015  4:46 PM  
 Influenza Vaccine (Quad) PF 10/20/2017 Pneumococcal Conjugate (PCV-13) 3/16/2016 12:00 AM  
 Pneumococcal Polysaccharide (PPSV-23) 8/1/2013 Zoster Vaccine, Live 10/14/2014 12:00 AM  
  
 Not reviewed this visit You Were Diagnosed With   
  
 Codes Comments Skin infection    -  Primary ICD-10-CM: L08.9 ICD-9-CM: 834. 9 Vitals BP Pulse Temp Resp Height(growth percentile) Weight(growth percentile) 130/80 (BP 1 Location: Left arm, BP Patient Position: Sitting) 92 98 °F (36.7 °C) (Temporal) 24 5' 10\" (1.778 m) 195 lb 12.8 oz (88.8 kg) SpO2 BMI OB Status Smoking Status 93% 28.09 kg/m2 Postmenopausal Former Smoker Vitals History BMI and BSA Data Body Mass Index Body Surface Area 28.09 kg/m 2 2.09 m 2 Preferred Pharmacy Pharmacy Name Phone [address] [phone] Your Updated Medication List  
  
   
This list is accurate as of: 12/21/17  3:48 PM.  Always use your most recent med list.  
  
  
  
  
 ALPRAZolam 0.5 mg tablet Commonly known as:  Pixie Oz Take 1 Tab by mouth nightly as needed for Anxiety or Sleep. Max Daily Amount: 0.5 mg. Indications: anxiety  
  
 aspirin 81 mg tablet Take 81 mg by mouth daily. atorvastatin 40 mg tablet Commonly known as:  LIPITOR Take 1 Tab by mouth daily. Indications: cholesterol and heart, replaces simastatin BACTRIM PO Take  by mouth.  
  
 calcium-vitamin D 500 mg(1,250mg) -200 unit per tablet Commonly known as:  OYSTER SHELL Take 1 Tab by mouth two (2) times daily (with meals). clindamycin 150 mg capsule Commonly known as:  CLEOCIN Take 1 Cap by mouth three (3) times daily for 10 days. dapsone 100 mg tablet TAKE ONE TABLET BY MOUTH DAILY  
  
 fluticasone 50 mcg/actuation nasal spray Commonly known as:  FLONASE  
2 sprays in each nostril once a day. glucose blood VI test strips strip Commonly known as:  ASCENSIA AUTODISC VI, ONE TOUCH ULTRA TEST VI  
Use as directed. DX E11.65  
  
 * insulin lispro 100 unit/mL Inph  
by SubCUTAneous route. * HumaLOG KwikPen 100 unit/mL kwikpen Generic drug:  insulin lispro INJECT 4 units SUBCUTANEOUSLY WITH MEALS AS DIRECTED HYDROcodone-acetaminophen 5-325 mg per tablet Commonly known as:  Rian White  
 Take 1-2 Tabs by mouth every four (4) hours as needed for Pain. Max Daily Amount: 12 Tabs. ibuprofen 800 mg tablet Commonly known as:  MOTRIN  
TAKE ONE TABLET BY MOUTH EVERY 6 HOURS WITH FOOD AS NEEDED  Indications: OSTEOARTHRITIS  
  
 insulin detemir 100 unit/mL (3 mL) Inpn Commonly known as:  LEVEMIR FLEXPEN  
40 units am and 42 units pm  Indications: Diabetes Mellitus with Severe Insulin Resistance  
  
 insulin syringe-needle U-100 1/2 mL 31 gauge x 15/64\" Syrg Commonly known as:  BD INSULIN SYRINGE ULTRA-FINE  
1 Units by Does Not Apply route daily as needed. DX E11.65  
  
 latanoprost 0.005 % ophthalmic solution Commonly known as:  Tomy Ratliff Administer 1 Drop to both eyes nightly. meclizine 12.5 mg tablet Commonly known as:  ANTIVERT Take 12.5 mg by mouth three (3) times daily as needed. TAKE 1 PILL A DAY PRN  
  
 metFORMIN  mg tablet Commonly known as:  GLUCOPHAGE XR Take 1 Tab by mouth two (2) times daily (with meals). multivitamin tablet Commonly known as:  ONE A DAY Take 1 Tab by mouth daily. mycophenolate 500 mg tablet Commonly known as:  CELLCEPT Take 1,000 mg by mouth 2 times daily. Do not chew or crush tablet Omega-3-DHA-EPA-Fish Oil 1,000 mg (120 mg-180 mg) Cap Take  by mouth. TAKE TWICE A DAY  
  
 omeprazole 40 mg capsule Commonly known as:  PRILOSEC Take 40 mg by mouth daily. timolol maleate 0.5 % Drpd ophthalmic solution Administer 1 Drop to both eyes daily. valsartan 320 mg tablet Commonly known as:  DIOVAN Take 1 Tab by mouth daily. Indications: pressure and kidney, replaces losartan  
  
 venlafaxine 100 mg tablet Commonly known as:  EFFEXOR  
TAKE ONE TABLET BY MOUTH 2 TIMES A DAY * Notice: This list has 2 medication(s) that are the same as other medications prescribed for you. Read the directions carefully, and ask your doctor or other care provider to review them with you. Prescriptions Sent to Pharmacy Refills  
 ibuprofen (MOTRIN) 800 mg tablet 0 Sig: TAKE ONE TABLET BY MOUTH EVERY 6 HOURS WITH FOOD AS NEEDED  Indications: OSTEOARTHRITIS Class: Normal  
 Pharmacy: [address], [address] [place] [address] [address] [place] [address] #: [phone]  
 clindamycin (CLEOCIN) 150 mg capsule 0 Sig: Take 1 Cap by mouth three (3) times daily for 10 days. Class: Normal  
 Pharmacy: [address], [address] [place] [address] Ph #: [phone] Route: Oral  
  
Introducing [facility]! Dear Madhuri Aragon: Thank you for requesting a [employer] account. Our records indicate that you have previously registered for a [employer] account but its currently inactive. Please call our [employer] support line at [phone]. Additional Information If you have questions, please visit the Frequently Asked Questions section of the [employer] website at [email or web address] Remember, [employer] is NOT to be used for urgent needs. For medical emergencies, dial 911. Now available from your iPhone and Android! Please provide this summary of care documentation to your next provider. Your primary care clinician is listed as Ivone Aranda. If you have any questions after today's visit, please call [phone].

## 2017-12-25 NOTE — PROGRESS NOTES
Subjective:     Goldie Meyers is a 60 y.o. female who presents today with the following:  Chief Complaint   Patient presents with    Skin Problem     nose irritation   Goldie Meyers presents for an acute visit. Skin around nose erythematous  . COMPLIANT WITH MEDICATION:       ROS:  Gen: denies fever, chills, fatigue, weight loss, weight gain  HEENT:denies blurry vision, nasal congestion, sore throat  Resp: denies dypsnea, cough, wheezing  CV: denies chest pain radiating to the jaws or arms, palpitations, lower extremity edema  Abd: denies nausea, vomiting, diarrhea, constipation  Neuro: denies numbness/tingling  Endo: denies polyuria, polydipsia, heat/cold intolerance  Heme: no lymphadenopathy    Allergies   Allergen Reactions    Codeine Vertigo    Doxycycline Unknown (comments)    Lodine [Etodolac] Hives     Sweats, shakes    Sulfa (Sulfonamide Antibiotics) Rash         Current Outpatient Prescriptions:     ibuprofen (MOTRIN) 800 mg tablet, TAKE ONE TABLET BY MOUTH EVERY 6 HOURS WITH FOOD AS NEEDED  Indications: OSTEOARTHRITIS, Disp: 90 Tab, Rfl: 0    SULFAMETHOXAZOLE/TRIMETHOPRIM (BACTRIM PO), Take  by mouth., Disp: , Rfl:     clindamycin (CLEOCIN) 150 mg capsule, Take 1 Cap by mouth three (3) times daily for 10 days. , Disp: 30 Cap, Rfl: 0    mycophenolate (CELLCEPT) 500 mg tablet, Take 1,000 mg by mouth 2 times daily. Do not chew or crush tablet, Disp: 360 Tab, Rfl: 3    dapsone 100 mg tablet, TAKE ONE TABLET BY MOUTH DAILY, Disp: 90 Tab, Rfl: 3    omeprazole (PRILOSEC) 40 mg capsule, Take 40 mg by mouth daily. , Disp: 90 Cap, Rfl: 1    HUMALOG KWIKPEN 100 unit/mL kwikpen, INJECT 4 units SUBCUTANEOUSLY WITH MEALS AS DIRECTED, Disp: 15 mL, Rfl: 0    atorvastatin (LIPITOR) 40 mg tablet, Take 1 Tab by mouth daily. Indications: cholesterol and heart, replaces simastatin, Disp: 90 Tab, Rfl: 3    valsartan (DIOVAN) 320 mg tablet, Take 1 Tab by mouth daily.  Indications: pressure and kidney, replaces losartan, Disp: 90 Tab, Rfl: 3    ALPRAZolam (XANAX) 0.5 mg tablet, Take 1 Tab by mouth nightly as needed for Anxiety or Sleep. Max Daily Amount: 0.5 mg. Indications: anxiety, Disp: 45 Tab, Rfl: 0    venlafaxine (EFFEXOR) 100 mg tablet, TAKE ONE TABLET BY MOUTH 2 TIMES A DAY, Disp: 180 Tab, Rfl: 1    fluticasone (FLONASE) 50 mcg/actuation nasal spray, 2 sprays in each nostril once a day., Disp: 16 g, Rfl: 3    metFORMIN ER (GLUCOPHAGE XR) 500 mg tablet, Take 1 Tab by mouth two (2) times daily (with meals). (Patient taking differently: Take 1,000 mg by mouth two (2) times daily (with meals). ), Disp: 60 Tab, Rfl: 10    insulin syringe-needle U-100 (BD INSULIN SYRINGE ULTRA-FINE) 1/2 mL 31 gauge x 15/64\" syrg, 1 Units by Does Not Apply route daily as needed. DX E11.65, Disp: 90 Pen Needle, Rfl: 1 yr    glucose blood VI test strips (ASCENSIA AUTODISC VI, ONE TOUCH ULTRA TEST VI) strip, Use as directed. DX E11.65, Disp: 100 Strip, Rfl: 1 yr    calcium-vitamin D (OYSTER SHELL) 500 mg(1,250mg) -200 unit per tablet, Take 1 Tab by mouth two (2) times daily (with meals). , Disp: , Rfl:     meclizine (ANTIVERT) 12.5 mg tablet, Take 12.5 mg by mouth three (3) times daily as needed. TAKE 1 PILL A DAY PRN, Disp: , Rfl:     timolol maleate 0.5 % DrpD ophthalmic solution, Administer 1 Drop to both eyes daily. , Disp: , Rfl:     latanoprost (XALATAN) 0.005 % ophthalmic solution, Administer 1 Drop to both eyes nightly., Disp: , Rfl:     multivitamin (ONE A DAY) tablet, Take 1 Tab by mouth daily. , Disp: , Rfl:     Omega-3-DHA-EPA-Fish Oil 1,000 (120-180) mg cap, Take  by mouth. TAKE TWICE A DAY, Disp: , Rfl:     aspirin 81 mg tablet, Take 81 mg by mouth daily. , Disp: , Rfl:     insulin lispro 100 unit/mL inph, by SubCUTAneous route., Disp: , Rfl:     insulin detemir (LEVEMIR FLEXPEN) 100 unit/mL (3 mL) inpn, 40 units am and 42 units pm  Indications: Diabetes Mellitus with Severe Insulin Resistance, Disp: 45 mL, Rfl: 10   HYDROcodone-acetaminophen (NORCO) 5-325 mg per tablet, Take 1-2 Tabs by mouth every four (4) hours as needed for Pain. Max Daily Amount: 12 Tabs., Disp: 15 Tab, Rfl: 0    Past Medical History:   Diagnosis Date    Colon polyps     Depression     Diverticulosis     Fatty liver 2009    due to steroids    GERD (gastroesophageal reflux disease)     Glaucoma     Interstitial lung disease (HCC)     Menopause     onset at age 49    Mild dysplasia of cervix 1984    Palpitations     Peripheral artery disease ([address]     stent behind left knee.     Pneumonia 2013    hospitalized for 8 days    Pneumonitis 2007    Type 2 diabetes mellitus ([address]     2007    Undiagnosed cardiac murmurs        Past Surgical History:   Procedure Laterality Date    HX CARPAL TUNNEL RELEASE  1/2000    bilateral    HX COLONOSCOPY  1/2006, 2013    polyps present    HX GYN  1/1984    Cervical conization    HX HEART CATHETERIZATION  09/2016    UVa-normal coronaries, normal LV function, normal hemodynamics    HX KNEE ARTHROSCOPY  1/1995    HX KNEE ARTHROSCOPY  1/2000    HX ORTHOPAEDIC      bilat. knee surgery    HX ORTHOPAEDIC Right 12/27/2016    A1 pulley release of the right middle finger    HX OTHER SURGICAL  2007    lung biopsy    VASCULAR SURGERY PROCEDURE UNLIST  10/14    left leg clot; s/p stent        History   Smoking Status    Former Smoker    Packs/day: 0.80    Years: 25.00    Types: Cigarettes    Quit date: 10/1/2000   Smokeless Tobacco    Never Used       Social History     Social History    Marital status:      Spouse name: N/A    Number of children: N/A    Years of education: N/A     Social History Main Topics    Smoking status: Former Smoker     Packs/day: 0.80     Years: 25.00     Types: Cigarettes     Quit date: 10/1/2000    Smokeless tobacco: Never Used    Alcohol use 4.2 oz/week     7 Standard drinks or equivalent per week    Drug use: No    Sexual activity: Yes     Partners: Male     Other Topics Concern    None     Social History Narrative    Happily . No concern for abuse. Exercise: none    Diet: Careful. Wear Seatbelts               Family History   Problem Relation Age of Onset    Breast Cancer Mother     Cancer Mother      multiple myeloma    Osteoporosis Mother     Deep Vein Thrombosis Mother     Hypertension Mother     Heart Disease Father     Cancer Maternal Grandmother      multiple myeloma    Cancer Sister      Melanoma         Objective:     Visit Vitals    /80 (BP 1 Location: Left arm, BP Patient Position: Sitting)    Pulse 92    Temp 98 °F (36.7 °C) (Temporal)    Resp 24    Ht 5' 10\" (1.778 m)    Wt 195 lb 12.8 oz (88.8 kg)    SpO2 93%    BMI 28.09 kg/m2     Body mass index is 28.09 kg/(m^2). General: Alert and oriented. No acute distress. Well nourished  HEENT :  Ears:TMs are normal. Canals are clear. Eyes: pupils equal, round, react to light and accommodation. Extra ocular movements intact. Nose: patent. Mouth and throat is clear. Neck:supple full range of motion no thyromegaly. Trachea midline, No carotid bruits. No significant lymphadenopathy  Lungs[de-identified] clear to auscultation without wheezes, rales, or rhonchi. Skin: erythematous area next to nose.        Results for orders placed or performed during the hospital encounter of 12/15/17   ACTH   Result Value Ref Range    ACTH, plasma 13.1 7.2 - 58.2 pg/mL   FOLLICLE STIMULATING HORMONE   Result Value Ref Range    FSH 36.9 mIU/mL   GLUCOSE, FASTING   Result Value Ref Range    Glucose 139 (H) 65 - 100 MG/DL   HEMOGLOBIN A1C WITH EAG   Result Value Ref Range    Hemoglobin A1c 4.2 (L) 4.5 - 6.2 %    Est. average glucose Cannot be calculated mg/dL   LUTEINIZING HORMONE   Result Value Ref Range    Luteinizing hormone 14.8 mIU/mL   PROLACTIN   Result Value Ref Range    Prolactin 3.6 ng/mL   CORTISOL   Result Value Ref Range    Cortisol, random 16.9 ug/dL   INSULIN-LIKE GROWTH FACTOR 1   Result Value Ref Range    Insulin-Like Growth Factor I 125 42 - 169 ng/mL   ALPHA SUBUNIT, FREE, PITUITARY GLYCOPROTEIN HORMONES   Result Value Ref Range    Alpha Subunit (Free) 0.90 ng/mL       No results found for this visit on 12/21/17. Assessment/ Plan:     Diagnoses and all orders for this visit:    1. Skin infection    Other orders  -     ibuprofen (MOTRIN) 800 mg tablet; TAKE ONE TABLET BY MOUTH EVERY 6 HOURS WITH FOOD AS NEEDED  Indications: OSTEOARTHRITIS  -     clindamycin (CLEOCIN) 150 mg capsule; Take 1 Cap by mouth three (3) times daily for 10 days. 1. Skin infection        Orders Placed This Encounter    ibuprofen (MOTRIN) 800 mg tablet     Sig: TAKE ONE TABLET BY MOUTH EVERY 6 HOURS WITH FOOD AS NEEDED  Indications: OSTEOARTHRITIS     Dispense:  90 Tab     Refill:  0    insulin lispro 100 unit/mL inph     Sig: by SubCUTAneous route.  SULFAMETHOXAZOLE/TRIMETHOPRIM (BACTRIM PO)     Sig: Take  by mouth.  clindamycin (CLEOCIN) 150 mg capsule     Sig: Take 1 Cap by mouth three (3) times daily for 10 days. Dispense:  30 Cap     Refill:  0         Verbal and written instructions (see AVS) provided.  Patient expresses understanding of diagnosis and treatment plan. Follow-up Disposition:  Return if symptoms worsen or fail to improve.       Henrique Oscar, MARZENAP-C

## 2018-02-12 DIAGNOSIS — E78.2 MIXED HYPERLIPIDEMIA: ICD-10-CM

## 2018-02-12 DIAGNOSIS — I10 ESSENTIAL HYPERTENSION, BENIGN: ICD-10-CM

## 2018-02-13 RX ORDER — VALSARTAN 320 MG/1
320 TABLET ORAL DAILY
Qty: 90 TAB | Refills: 3 | Status: SHIPPED | OUTPATIENT
Start: 2018-02-13 | End: 2018-03-01 | Stop reason: SDUPTHER

## 2018-02-13 RX ORDER — ATORVASTATIN CALCIUM 40 MG/1
40 TABLET, FILM COATED ORAL DAILY
Qty: 90 TAB | Refills: 3 | Status: SHIPPED | OUTPATIENT
Start: 2018-02-13 | End: 2018-03-01 | Stop reason: SDUPTHER

## 2018-03-01 ENCOUNTER — OFFICE VISIT (OUTPATIENT)
Dept: FAMILY MEDICINE CLINIC | Age: 63
End: 2018-03-01

## 2018-03-01 VITALS
RESPIRATION RATE: 16 BRPM | DIASTOLIC BLOOD PRESSURE: 66 MMHG | HEART RATE: 83 BPM | HEIGHT: 70 IN | SYSTOLIC BLOOD PRESSURE: 128 MMHG | WEIGHT: 195 LBS | OXYGEN SATURATION: 96 % | BODY MASS INDEX: 27.92 KG/M2 | TEMPERATURE: 98.3 F

## 2018-03-01 DIAGNOSIS — I10 ESSENTIAL HYPERTENSION, BENIGN: ICD-10-CM

## 2018-03-01 DIAGNOSIS — E78.2 MIXED HYPERLIPIDEMIA: ICD-10-CM

## 2018-03-01 RX ORDER — LEVOTHYROXINE SODIUM 50 UG/1
1 TABLET ORAL DAILY
COMMUNITY
Start: 2018-02-12 | End: 2018-04-19 | Stop reason: ALTCHOICE

## 2018-03-01 RX ORDER — VALSARTAN 160 MG/1
160 TABLET ORAL DAILY
Qty: 30 TAB | Refills: 4
Start: 2018-03-01 | End: 2018-08-15

## 2018-03-01 RX ORDER — ATORVASTATIN CALCIUM 40 MG/1
20 TABLET, FILM COATED ORAL DAILY
Qty: 90 TAB | Refills: 3
Start: 2018-03-01

## 2018-03-01 RX ORDER — BRIMONIDINE TARTRATE, TIMOLOL MALEATE 2; 5 MG/ML; MG/ML
1 SOLUTION/ DROPS OPHTHALMIC 2 TIMES DAILY
COMMUNITY
Start: 2018-02-20

## 2018-03-01 NOTE — MR AVS SNAPSHOT
[place] [facility] 
 
 
 [address] [employer] 
[phone] Patient: Lisa Ramirez MRN: [id number] Visit Information Date & Time Provider Department Dept. Phone Encounter #  
 3/1/2018  3:00 PM Dann Wing, [address] [phone] [id number] Follow-up Instructions Return in about 4 weeks (around 3/29/2018). Follow-up and Disposition History Your Appointments 4/3/2018  8:00 AM  
Complete Physical with Alexa Samuel NP  
[address] ([facility]) Appt Note: pap  
 [address] [address]  
[address] [facility] [address] [address] Upcoming Health Maintenance Date Due FOBT Q 1 YEAR AGE 50-75 10/14/2004 EYE EXAM RETINAL OR DILATED Q1 3/24/2017 HEMOGLOBIN A1C Q6M 6/15/2018 LIPID PANEL Q1 2018 BREAST CANCER SCRN MAMMOGRAM 2018 MICROALBUMIN Q1 2018 FOOT EXAM Q1 3/1/2019 PAP AKA CERVICAL CYTOLOGY 2019 DTaP/Tdap/Td series (2 - Td) 3/1/2028 Allergies as of 3/1/2018  Review Complete On: 3/1/2018 By: Dann Wing MD  
  
 Severity Noted Reaction Type Reactions Codeine  2013    Vertigo Doxycycline  2013    Unknown (comments) Lodine [Etodolac]  2013    Hives Sweats, shakes Sulfa (Sulfonamide Antibiotics)  2013    Rash Current Immunizations  Reviewed on 12/15/2016 Name Date Hep A Vaccine (Adult) 2017  1:53 PM, 10/11/2016 Hep B Vaccine (Adult) 2017  1:54 PM, 12/15/2016, 10/11/2016 Influenza High Dose Vaccine PF 10/11/2016 Influenza Vaccine 10/5/2015 12:00 AM, 10/5/2015 12:00 AM  
 Influenza Vaccine (Quad) 10/14/2015  4:46 PM  
 Influenza Vaccine (Quad) PF 10/20/2017  Pneumococcal Conjugate (PCV-13) 3/16/2016 12:00 AM  
 Pneumococcal Polysaccharide (PPSV-23) 8/1/2013 Zoster Vaccine, Live 10/14/2014 12:00 AM  
  
 Not reviewed this visit You Were Diagnosed With   
  
 Codes Comments Essential hypertension, benign     ICD-10-CM: I10 
ICD-9-CM: 401.1 Mixed hyperlipidemia     ICD-10-CM: E78.2 ICD-9-CM: 272.2 Vitals BP Pulse Temp Resp Height(growth percentile) Weight(growth percentile) 128/66 (BP 1 Location: Left arm, BP Patient Position: Sitting) 83 98.3 °F (36.8 °C) (Oral) 16 5' 10\" (1.778 m) 195 lb (88.5 kg) SpO2 BMI OB Status Smoking Status 96% 27.98 kg/m2 Postmenopausal Former Smoker BMI and BSA Data Body Mass Index Body Surface Area  
 27.98 kg/m 2 2.09 m 2 Preferred Pharmacy Pharmacy Name Phone [address] [facility] [phone] Your Updated Medication List  
  
   
This list is accurate as of 3/1/18  3:48 PM.  Always use your most recent med list.  
  
  
  
  
 ALPRAZolam 0.5 mg tablet Commonly known as:  Sohail Tyesha Take 1 Tab by mouth nightly as needed for Anxiety or Sleep. Max Daily Amount: 0.5 mg. Indications: anxiety  
  
 aspirin 81 mg tablet Take 81 mg by mouth daily. atorvastatin 40 mg tablet Commonly known as:  LIPITOR Take 0.5 Tabs by mouth daily. Indications: cholesterol and heart, replaces simastatin BACTRIM PO Take  by mouth.  
  
 calcium-vitamin D 500 mg(1,250mg) -200 unit per tablet Commonly known as:  OYSTER SHELL Take 1 Tab by mouth two (2) times daily (with meals). COMBIGAN 0.2-0.5 % Drop ophthalmic solution Generic drug:  brimonidine-timolol Administer 1 Drop to both eyes two (2) times a day. dapsone 100 mg tablet TAKE ONE TABLET BY MOUTH DAILY  
  
 fluticasone 50 mcg/actuation nasal spray Commonly known as:  FLONASE  
2 sprays in each nostril once a day. glucose blood VI test strips strip Commonly known as:  ASCENSIA AUTODISC VI, ONE TOUCH ULTRA TEST VI  
Use as directed. DX E11.65  
  
 * insulin lispro 100 unit/mL Inph  
by SubCUTAneous route. * HumaLOG KwikPen Insulin 100 unit/mL kwikpen Generic drug:  insulin lispro INJECT 4 units SUBCUTANEOUSLY WITH MEALS AS DIRECTED  
  
 ibuprofen 800 mg tablet Commonly known as:  MOTRIN  
TAKE ONE TABLET BY MOUTH EVERY 6 HOURS WITH FOOD AS NEEDED  Indications: OSTEOARTHRITIS  
  
 insulin syringe-needle U-100 1/2 mL 31 gauge x 15/64\" Syrg Commonly known as:  BD INSULIN SYRINGE ULTRA-FINE  
1 Units by Does Not Apply route daily as needed. DX E11.65  
  
 latanoprost 0.005 % ophthalmic solution Commonly known as:  Osiris Agosto Administer 1 Drop to both eyes nightly. meclizine 12.5 mg tablet Commonly known as:  ANTIVERT Take 12.5 mg by mouth three (3) times daily as needed. TAKE 1 PILL A DAY PRN  
  
 metFORMIN  mg tablet Commonly known as:  GLUCOPHAGE XR Take 1 Tab by mouth two (2) times daily (with meals). multivitamin tablet Commonly known as:  ONE A DAY Take 1 Tab by mouth daily. mycophenolate 500 mg tablet Commonly known as:  CELLCEPT Take 1,000 mg by mouth 2 times daily. Do not chew or crush tablet Omega-3-DHA-EPA-Fish Oil 1,000 mg (120 mg-180 mg) Cap Take  by mouth. TAKE TWICE A DAY  
  
 omeprazole 40 mg capsule Commonly known as:  PRILOSEC Take 40 mg by mouth daily. synthroid 50 mcg tablet Generic drug:  levothyroxine Take 1 Tab by mouth daily. valsartan 160 mg tablet Commonly known as:  DIOVAN Take 1 Tab by mouth daily. Indications: pressure and kidney  
  
 venlafaxine 100 mg tablet Commonly known as:  EFFEXOR  
TAKE ONE TABLET BY MOUTH 2 TIMES A DAY * Notice: This list has 2 medication(s) that are the same as other medications prescribed for you. Read the directions carefully, and ask your doctor or other care provider to review them with you. Follow-up Instructions Return in about 4 weeks (around 3/29/2018). Introducing [facility]! Dear Amy Bunn: Thank you for requesting a [employer] account. Our records indicate that you have previously registered for a [employer] account but its currently inactive. Please call our [employer] support line at [phone]. Additional Information If you have questions, please visit the Frequently Asked Questions section of the [employer] website at [email or web address] Remember, [employer] is NOT to be used for urgent needs. For medical emergencies, dial 911. Now available from your iPhone and Android! Please provide this summary of care documentation to your next provider. Your primary care clinician is listed as Enriqueta Velasquez. If you have any questions after today's visit, please call [phone].

## 2018-03-01 NOTE — PROGRESS NOTES
José Garcia is a 60 y.o. female presenting for/with:    Hypertension (follow up)    HPI:  Diabetes. Followed by Dr. Juan Pablo Spaulding in [place]. Sugars controlled a little too well per last A1c. Hypoglycemia: none that she's noticed  Tolerating current treatment moderately, getting loose stools with the metformin about every night around 2am.  Current medications include metformin ER 1g BID, humalog (plain) 4 units pre-meal PRN sugars >150, and levemir 40 units BID    Lab Results   Component Value Date/Time    Hemoglobin A1c 4.2 (L) 12/15/2017 07:10 AM    Hemoglobin A1c 6.6 (H) 10/24/2017 11:36 AM    Hemoglobin A1c 5.0 08/01/2017 10:13 AM    Glucose 188 (H) 01/31/2018 09:06 AM    Glucose 139 (H) 12/15/2017 07:10 AM    Glucose (POC) 167 (H) 09/06/2017 08:10 AM    Microalbumin/Creat. Ratio 51 (H) 11/14/2017 02:35 PM    Microalbumin,urine 24 hr 75 (H) 11/14/2017 02:35 PM    Microalbumin,urine random 0.65 11/10/2017 02:55 PM    LDL, calculated 104 (H) 08/01/2017 10:13 AM    Creatinine 0.76 01/31/2018 09:06 AM     Lab Results   Component Value Date/Time    Microalbumin/Creat. Ratio 51 (H) 11/14/2017 02:35 PM    Microalbumin,urine 24 hr 75 (H) 11/14/2017 02:35 PM    Microalbumin,urine random 0.65 11/10/2017 02:55 PM     Hypertension. Blood pressures have been improving. Management at last visit with us included change losartan to diovan 320, but had some HA's and dizziness with that, so changed by pulm at last visit with them in Feb to 160mg daily. HA's went away, but still having dizziness. Has sx of dizziness still, but usually with changing position. Current regimen: angiotensin II receptor antagonist. Symptoms include no symptoms. Patient denies chest pain, palpitations, peripheral edema.   Lab review:   Lab Results   Component Value Date/Time    Sodium 140 01/31/2018 09:06 AM    Potassium 4.3 01/31/2018 09:06 AM    Chloride 101 01/31/2018 09:06 AM    CO2 29 01/31/2018 09:06 AM    Anion gap 10 01/31/2018 09:06 AM    Glucose 188 (H) 01/31/2018 09:06 AM    Glucose 139 (H) 12/15/2017 07:10 AM    BUN 14 01/31/2018 09:06 AM    Creatinine 0.76 01/31/2018 09:06 AM    BUN/Creatinine ratio 18 01/31/2018 09:06 AM    GFR est AA >60 01/31/2018 09:06 AM    GFR est non-AA >60 01/31/2018 09:06 AM    Calcium 9.2 01/31/2018 09:06 AM     Hyperlipidemia. On lipitor 40 and ASA 81mg qd. Conor well. No myalgias, arthralgias, unusual weakness, but LFT's ore by Endocrine were pretty high last check since making change. Lab Results   Component Value Date/Time    Cholesterol, total 210 (H) 08/01/2017 10:13 AM    HDL Cholesterol 66 08/01/2017 10:13 AM    LDL, calculated 104 (H) 08/01/2017 10:13 AM    VLDL, calculated 40 08/01/2017 10:13 AM    Triglyceride 200 (H) 08/01/2017 10:13 AM     Lab Results   Component Value Date/Time    ALT (SGPT) 202 (H) 01/31/2018 09:06 AM    AST (SGOT) 124 (H) 01/31/2018 09:06 AM    Alk. phosphatase 246 (H) 01/31/2018 09:06 AM    Bilirubin, total 1.4 (H) 01/31/2018 09:06 AM     PMH, SH, Medications/Allergies: reviewed, on chart.     ROS:  Constitutional: No fever, chills or weight loss  Respiratory: No cough, SOB   CV: No chest pain or Palpitations    Visit Vitals    /66 (BP 1 Location: Left arm, BP Patient Position: Sitting)    Pulse 83    Temp 98.3 °F (36.8 °C) (Oral)    Resp 16    Ht 5' 10\" (1.778 m)    Wt 195 lb (88.5 kg)    SpO2 96%    BMI 27.98 kg/m2     Wt Readings from Last 3 Encounters:   03/01/18 195 lb (88.5 kg)   12/21/17 195 lb 12.8 oz (88.8 kg)   10/24/17 191 lb (86.6 kg)   -0#    BP Readings from Last 3 Encounters:   03/01/18 128/66   12/21/17 130/80   10/24/17 147/81     Physical Examination: General appearance - alert, well appearing, and in no distress  Mental status - alert, oriented to person, place, and time  Eyes - pupils equal and reactive, extraocular eye movements intact  ENT - bilateral external ears and nose normal. Normal lips  Neck - supple, no significant adenopathy, no thyromegaly or mass  Lymphatics - no palpable lymphadenopathy, no hepatosplenomegaly  Chest - clear to auscultation, no wheezes, rales or rhonchi, symmetric air entry  Heart - normal rate, regular rhythm, normal S1, S2, no murmurs, rubs, clicks or gallops  Extremities - peripheral pulses normal, no pedal edema, no clubbing or cyanosis    A/P:  DM2  Maybe a little overcontrolled. Rec to con't f/u with Endocrine in [address]. Consider checking some early AM BG's to see if she's dipping in her sleep. HTN  In goal even on lower dose diovan 160 from pulm. Try cutting to 80 BID. very day, should work better. HLD  With big bump in LFT's since last check. Try change to lipitor 20 every day. Plan lft's and lipids at f/u.

## 2018-03-05 RX ORDER — VENLAFAXINE 100 MG/1
TABLET ORAL
Qty: 180 TAB | Refills: 3 | Status: SHIPPED | OUTPATIENT
Start: 2018-03-05 | End: 2019-02-18 | Stop reason: SDUPTHER

## 2018-03-05 RX ORDER — DAPSONE 100 MG/1
TABLET ORAL
Qty: 90 TAB | Refills: 3 | Status: SHIPPED | OUTPATIENT
Start: 2018-03-05

## 2018-04-19 ENCOUNTER — OFFICE VISIT (OUTPATIENT)
Dept: FAMILY MEDICINE CLINIC | Age: 63
End: 2018-04-19

## 2018-04-19 VITALS
WEIGHT: 194 LBS | DIASTOLIC BLOOD PRESSURE: 70 MMHG | OXYGEN SATURATION: 94 % | HEIGHT: 70 IN | BODY MASS INDEX: 27.77 KG/M2 | SYSTOLIC BLOOD PRESSURE: 120 MMHG | TEMPERATURE: 98 F | HEART RATE: 85 BPM | RESPIRATION RATE: 16 BRPM

## 2018-04-19 DIAGNOSIS — E03.9 ACQUIRED HYPOTHYROIDISM: ICD-10-CM

## 2018-04-19 DIAGNOSIS — I87.2 VENOUS STASIS DERMATITIS OF RIGHT LOWER EXTREMITY: ICD-10-CM

## 2018-04-19 DIAGNOSIS — E78.2 MIXED HYPERLIPIDEMIA: ICD-10-CM

## 2018-04-19 DIAGNOSIS — I10 ESSENTIAL HYPERTENSION, BENIGN: Primary | ICD-10-CM

## 2018-04-19 PROBLEM — E11.21 TYPE 2 DIABETES WITH NEPHROPATHY (HCC): Status: ACTIVE | Noted: 2018-04-19

## 2018-04-19 RX ORDER — LEVOTHYROXINE SODIUM 75 UG/1
75 TABLET ORAL
Qty: 90 TAB | Refills: 3
Start: 2018-04-19

## 2018-04-19 RX ORDER — MUPIROCIN 20 MG/G
OINTMENT TOPICAL 2 TIMES DAILY
Qty: 22 G | Refills: 1 | Status: SHIPPED | OUTPATIENT
Start: 2018-04-19 | End: 2018-09-19 | Stop reason: ALTCHOICE

## 2018-04-19 RX ORDER — INSULIN GLARGINE 100 [IU]/ML
45 INJECTION, SOLUTION SUBCUTANEOUS DAILY
COMMUNITY
Start: 2018-04-13

## 2018-04-19 NOTE — PROGRESS NOTES
1. Have you been to the ER, urgent care clinic since your last visit? Hospitalized since your last visit? No    2. Have you seen or consulted any other health care providers outside of the [address] [place] [address] since your last visit? Include any pap smears or colon screening. Yes, Endocrinologist, Dr. Joanie Schaumann.

## 2018-04-19 NOTE — PATIENT INSTRUCTIONS
Learning About Low-Carbohydrate Diets for Weight Loss  What is a low-carbohydrate diet? Low-carb diets avoid foods that are high in carbohydrate. These high-carb foods include pasta, bread, rice, cereal, fruits, and starchy vegetables. Instead, these diets usually have you eat foods that are high in fat and protein. Many people lose weight quickly on a low-carb diet. But the early weight loss is water. People on this diet often gain the weight back after they start eating carbs again. Not all diet plans are safe or work well. A lot of the evidence shows that low-carb diets aren't healthy. That's because these diets often don't include healthy foods like fruits and vegetables. Losing weight safely means balancing protein, fat, and carbs with every meal and snack. And low-carb diets don't always provide the vitamins, minerals, and fiber you need. If you have a serious medical condition, talk to your doctor before you try any diet. These conditions include kidney disease, heart disease, type 2 diabetes, high cholesterol, and high blood pressure. If you are pregnant, it may not be safe for your baby if you are on a low-carb diet. How can you lose weight safely? You might have heard that a diet plan helped another person lose weight. But that doesn't mean that it will work for you. It is very hard to stay on a diet that includes lots of big changes in your eating habits. If you want to get to a healthy weight and stay there, making healthy lifestyle changes will often work better than dieting. These steps can help. · Make a plan for change. Work with your doctor to create a plan that is right for you. · See a dietitian. He or she can show you how to make healthy changes in your eating habits. · Manage stress. If you have a lot of stress in your life, it can be hard to focus on making healthy changes to your daily habits. · Track your food and activity.  You are likely to do better at losing weight if you keep track of what you eat and what you do. Follow-up care is a key part of your treatment and safety. Be sure to make and go to all appointments, and call your doctor if you are having problems. It's also a good idea to know your test results and keep a list of the medicines you take. Where can you learn more? Go to http://teodoro-roxi.info/. Enter A121 in the search box to learn more about \"Learning About Low-Carbohydrate Diets for Weight Loss. \"  Current as of: May 12, 2017  Content Version: 11.4  © 0069-8438 [employer], [employer]. Care instructions adapted under license by [employer] (which disclaims liability or warranty for this information). If you have questions about a medical condition or this instruction, always ask your healthcare professional. [address] any warranty or liability for your use of this information.

## 2018-04-19 NOTE — MR AVS SNAPSHOT
[address] [place] [address] [place]  
[phone] Patient: Marianne Craig MRN: [id number] :1955 Visit Information Date & Time Provider Department Dept. Phone Encounter #  
 2018  1:00 PM Mamadou Singh, [address] [phone] [id number] Follow-up Instructions Return in about 6 months (around 10/19/2018), or if symptoms worsen or fail to improve. Follow-up and Disposition History Upcoming Health Maintenance Date Due FOBT Q 1 YEAR AGE 50-75 10/14/2004 BREAST CANCER SCRN MAMMOGRAM 2018 HEMOGLOBIN A1C Q6M 6/15/2018 MICROALBUMIN Q1 2018 EYE EXAM RETINAL OR DILATED Q1 2019 FOOT EXAM Q1 3/1/2019 LIPID PANEL Q1 2019 PAP AKA CERVICAL CYTOLOGY 2019 DTaP/Tdap/Td series (2 - Td) 3/1/2028 Allergies as of 2018  Review Complete On: 2018 By: Mamadou Singh MD  
  
 Severity Noted Reaction Type Reactions Codeine  2013    Vertigo Doxycycline  2013    Unknown (comments) Lodine [Etodolac]  2013    Hives Sweats, shakes Sulfa (Sulfonamide Antibiotics)  2013    Rash Current Immunizations  Reviewed on 12/15/2016 Name Date Hep A Vaccine (Adult) 2017  1:53 PM, 10/11/2016 Hep B Vaccine (Adult) 2017  1:54 PM, 12/15/2016, 10/11/2016 Influenza High Dose Vaccine PF 10/11/2016 Influenza Vaccine 10/5/2015 12:00 AM, 10/5/2015 12:00 AM  
 Influenza Vaccine (Quad) 10/14/2015  4:46 PM  
 Influenza Vaccine (Quad) PF 10/20/2017 Pneumococcal Conjugate (PCV-13) 3/16/2016 12:00 AM  
 Pneumococcal Polysaccharide (PPSV-23) 2013 Zoster Vaccine, Live 10/14/2014 12:00 AM  
  
 Not reviewed this visit You Were Diagnosed With   
  
 Codes Comments Essential hypertension, benign    -  Primary ICD-10-CM: I10 
ICD-9-CM: 401.1 Mixed hyperlipidemia     ICD-10-CM: E78.2 ICD-9-CM: 272.2 Acquired hypothyroidism     ICD-10-CM: E03.9 ICD-9-CM: 907. 9 Venous stasis dermatitis of right lower extremity     ICD-10-CM: I87.2 ICD-9-CM: 454.1 Vitals BP Pulse Temp Resp Height(growth percentile) Weight(growth percentile) 120/70 (BP 1 Location: Left arm, BP Patient Position: Sitting) 85 98 °F (36.7 °C) (Oral) 16 5' 10\" (1.778 m) 194 lb (88 kg) SpO2 BMI OB Status Smoking Status 94% 27.84 kg/m2 Postmenopausal Former Smoker BMI and BSA Data Body Mass Index Body Surface Area  
 27.84 kg/m 2 2.08 m 2 Preferred Pharmacy Pharmacy Name Phone [address], [address] [facility] [phone] Your Updated Medication List  
  
   
This list is accurate as of 4/19/18  2:29 PM.  Always use your most recent med list.  
  
  
  
  
 ALPRAZolam 0.5 mg tablet Commonly known as:  XANAX  
TAKE ONE TABLET BY MOUTH EVERY NIGHT AS NEEDED FOR ANXIETY OR SLEEP  
  
 aspirin 81 mg tablet Take 81 mg by mouth daily. atorvastatin 40 mg tablet Commonly known as:  LIPITOR Take 0.5 Tabs by mouth daily. Indications: cholesterol and heart, replaces simastatin BACTRIM PO Take  by mouth.  
  
 calcium-vitamin D 500 mg(1,250mg) -200 unit per tablet Commonly known as:  OYSTER SHELL Take 1 Tab by mouth two (2) times daily (with meals). COMBIGAN 0.2-0.5 % Drop ophthalmic solution Generic drug:  brimonidine-timolol Administer 1 Drop to both eyes two (2) times a day. dapsone 100 mg tablet TAKE ONE TABLET BY MOUTH DAILY  
  
 fluticasone 50 mcg/actuation nasal spray Commonly known as:  FLONASE  
2 sprays in each nostril once a day. glucose blood VI test strips strip Commonly known as:  ASCENSIA AUTODISC VI, ONE TOUCH ULTRA TEST VI  
Use as directed. DX E11.65  
  
 * insulin lispro 100 unit/mL Inph  
by SubCUTAneous route. * HumaLOG KwikPen Insulin 100 unit/mL kwikpen Generic drug:  insulin lispro INJECT 4 units SUBCUTANEOUSLY WITH MEALS AS DIRECTED  
  
 ibuprofen 800 mg tablet Commonly known as:  MOTRIN  
TAKE ONE TABLET BY MOUTH EVERY 6 HOURS WITH FOOD AS NEEDED  Indications: OSTEOARTHRITIS  
  
 insulin syringe-needle U-100 1/2 mL 31 gauge x 15/64\" Syrg Commonly known as:  BD INSULIN SYRINGE ULTRA-FINE  
1 Units by Does Not Apply route daily as needed. DX E11.65  
  
 LANTUS SOLOSTAR U-100 INSULIN 100 unit/mL (3 mL) Inpn Generic drug:  insulin glargine 100 Units by SubCUTAneous route daily. 45 units  
  
 latanoprost 0.005 % ophthalmic solution Commonly known as:  Clifm Min Administer 1 Drop to both eyes nightly. levothyroxine 75 mcg tablet Commonly known as:  SYNTHROID Take 1 Tab by mouth Daily (before breakfast). meclizine 12.5 mg tablet Commonly known as:  ANTIVERT Take 12.5 mg by mouth three (3) times daily as needed. TAKE 1 PILL A DAY PRN  
  
 metFORMIN  mg tablet Commonly known as:  GLUCOPHAGE XR Take 1 Tab by mouth two (2) times daily (with meals). multivitamin tablet Commonly known as:  ONE A DAY Take 1 Tab by mouth daily. mupirocin 2 % ointment Commonly known as:  [employer] Apply  to affected area two (2) times a day. mycophenolate 500 mg tablet Commonly known as:  CELLCEPT Take 1,000 mg by mouth 2 times daily. Do not chew or crush tablet  
  
 omega 3-DHA-EPA-fish oil 1,000 mg (120 mg-180 mg) capsule Take  by mouth. TAKE TWICE A DAY  
  
 omeprazole 40 mg capsule Commonly known as:  PRILOSEC Take 40 mg by mouth daily. valsartan 160 mg tablet Commonly known as:  DIOVAN Take 1 Tab by mouth daily. Indications: pressure and kidney  
  
 venlafaxine 100 mg tablet Commonly known as:  EFFEXOR  
TAKE ONE TABLET BY MOUTH 2 TIMES A DAY * Notice: This list has 2 medication(s) that are the same as other medications prescribed for you.  Read the directions carefully, and ask your doctor or other care provider to review them with you. Prescriptions Sent to Pharmacy Refills  
 mupirocin (BACTROBAN) 2 % ointment 1 Sig: Apply  to affected area two (2) times a day. Class: Normal  
 Pharmacy: [address], [address] Ph #: [phone] Route: Topical  
  
Follow-up Instructions Return in about 6 months (around 10/19/2018), or if symptoms worsen or fail to improve. Patient Instructions Learning About Low-Carbohydrate Diets for Weight Loss What is a low-carbohydrate diet? Low-carb diets avoid foods that are high in carbohydrate. These high-carb foods include pasta, bread, rice, cereal, fruits, and starchy vegetables. Instead, these diets usually have you eat foods that are high in fat and protein. Many people lose weight quickly on a low-carb diet. But the early weight loss is water. People on this diet often gain the weight back after they start eating carbs again. Not all diet plans are safe or work well. A lot of the evidence shows that low-carb diets aren't healthy. That's because these diets often don't include healthy foods like fruits and vegetables. Losing weight safely means balancing protein, fat, and carbs with every meal and snack. And low-carb diets don't always provide the vitamins, minerals, and fiber you need. If you have a serious medical condition, talk to your doctor before you try any diet. These conditions include kidney disease, heart disease, type 2 diabetes, high cholesterol, and high blood pressure. If you are pregnant, it may not be safe for your baby if you are on a low-carb diet. How can you lose weight safely? You might have heard that a diet plan helped another person lose weight. But that doesn't mean that it will work for you. It is very hard to stay on a diet that includes lots of big changes in your eating habits.  If you want to get to a healthy weight and stay there, making healthy lifestyle changes will often work better than dieting. These steps can help. · Make a plan for change. Work with your doctor to create a plan that is right for you. · See a dietitian. He or she can show you how to make healthy changes in your eating habits. · Manage stress. If you have a lot of stress in your life, it can be hard to focus on making healthy changes to your daily habits. · Track your food and activity. You are likely to do better at losing weight if you keep track of what you eat and what you do. Follow-up care is a key part of your treatment and safety. Be sure to make and go to all appointments, and call your doctor if you are having problems. It's also a good idea to know your test results and keep a list of the medicines you take. Where can you learn more? Go to http://teodoro-roxi.info/. Enter A121 in the search box to learn more about \"Learning About Low-Carbohydrate Diets for Weight Loss. \" Current as of: May 12, 2017 Content Version: 11.4 © 0211-9230 [employer]. Care instructions adapted under license by [employer] (which disclaims liability or warranty for this information). If you have questions about a medical condition or this instruction, always ask your healthcare professional. [address] any warranty or liability for your use of this information. Patient Instructions History Introducing [facility]! [employer] introduces [employer] patient portal. Now you can access parts of your medical record, email your doctor's office, and request medication refills online. 1. In your internet browser, go to [email or web address] 2. Click on the First Time User? Click Here link in the Sign In box. You will see the New Member Sign Up page. 3. Enter your [employer]ia Access Code exactly as it appears below.  You will not need to use this code after youve completed the sign-up process. If you do not sign up before the expiration date, you must request a new code. · [facility] Access Code: [email or web address] Expires: 7/4/2018  8:00 AM 
 
4. Enter the last four digits of your Social Security Number (xxxx) and Date of Birth (mm/dd/yyyy) as indicated and click Submit. You will be taken to the next sign-up page. 5. Create a [facility] ID. This will be your [facility] login ID and cannot be changed, so think of one that is secure and easy to remember. 6. Create a [facility] password. You can change your password at any time. 7. Enter your Password Reset Question and Answer. This can be used at a later time if you forget your password. 8. Enter your e-mail address. You will receive e-mail notification when new information is available in [address] 9. Click Sign Up. You can now view and download portions of your medical record. 10. Click the Download Summary menu link to download a portable copy of your medical information. If you have questions, please visit the Frequently Asked Questions section of the [facility] website. Remember, [facility] is NOT to be used for urgent needs. For medical emergencies, dial 911. Now available from your iPhone and Android! Please provide this summary of care documentation to your next provider. Your primary care clinician is listed as Noel Vegrara. If you have any questions after today's visit, please call [phone].

## 2018-04-19 NOTE — PROGRESS NOTES
Mickie Breen is a 60 y.o. female presenting for/with:    Hypertension (follow up) and Skin Problem (sore on right shin )    HPI:  Diabetes. Followed by Dr. [place] Blum in [place]. Sugars controlled a little too well per last A1c. Hypoglycemia: none that she's noticed  Tolerating current treatment moderately, getting loose stools with the metformin about every night around 2am.  Current medications include metformin ER 1g BID, humalog (plain) 4-14 units pre-meal PRN sugars >150, and lantus 45 units daily    Lab Results   Component Value Date/Time    Hemoglobin A1c 4.2 (L) 12/15/2017 07:10 AM    Hemoglobin A1c 6.6 (H) 10/24/2017 11:36 AM    Hemoglobin A1c 5.0 08/01/2017 10:13 AM    Glucose 170 (H) 04/05/2018 08:10 AM    Glucose 139 (H) 12/15/2017 07:10 AM    Glucose (POC) 167 (H) 09/06/2017 08:10 AM    Microalbumin/Creat. Ratio 51 (H) 11/14/2017 02:35 PM    Microalbumin,urine 24 hr 75 (H) 11/14/2017 02:35 PM    Microalbumin,urine random 0.65 11/10/2017 02:55 PM    LDL, calculated 68.2 04/05/2018 08:10 AM    Creatinine 0.70 04/05/2018 08:10 AM     Lab Results   Component Value Date/Time    Microalbumin/Creat. Ratio 51 (H) 11/14/2017 02:35 PM    Microalbumin,urine 24 hr 75 (H) 11/14/2017 02:35 PM    Microalbumin,urine random 0.65 11/10/2017 02:55 PM     Hypertension. Blood pressures have been improving. Management at last visit with us included change diovan to 80mg BID. No further problems with dizziness. Current regimen: angiotensin II receptor antagonist. Symptoms include no symptoms. Patient denies chest pain, palpitations, peripheral edema.   Lab review:   Lab Results   Component Value Date/Time    Sodium 142 04/05/2018 08:10 AM    Potassium 4.3 04/05/2018 08:10 AM    Chloride 106 04/05/2018 08:10 AM    CO2 26 04/05/2018 08:10 AM    Anion gap 10 04/05/2018 08:10 AM    Glucose 170 (H) 04/05/2018 08:10 AM    Glucose 139 (H) 12/15/2017 07:10 AM    BUN 11 04/05/2018 08:10 AM    Creatinine 0.70 04/05/2018 08:10 AM    BUN/Creatinine ratio 16 04/05/2018 08:10 AM    GFR est AA >60 04/05/2018 08:10 AM    GFR est non-AA >60 04/05/2018 08:10 AM    Calcium 8.9 04/05/2018 08:10 AM     Hyperlipidemia. On lipitor 40 and ASA 81mg qd. Conor well. No myalgias, arthralgias. LFT's much improved since last visit. Lab Results   Component Value Date/Time    Cholesterol, total 158 04/05/2018 08:10 AM    HDL Cholesterol 75 04/05/2018 08:10 AM    LDL, calculated 68.2 04/05/2018 08:10 AM    VLDL, calculated 14.8 04/05/2018 08:10 AM    Triglyceride 74 04/05/2018 08:10 AM    CHOL/HDL Ratio 2.1 04/05/2018 08:10 AM     Lab Results   Component Value Date/Time    ALT (SGPT) 56 04/05/2018 08:10 AM    AST (SGOT) 46 (H) 04/05/2018 08:10 AM    Alk. phosphatase 119 (H) 04/05/2018 08:10 AM    Bilirubin, total 1.2 (H) 04/05/2018 08:10 AM     Spot on R shin  Started with a small abrasion about a month ago. Red puffy, tender. Has been cleaning it with peroxide and alcohol. PMH, SH, Medications/Allergies: reviewed, on chart.     ROS:  Constitutional: No fever, chills or weight loss  Respiratory: No cough, SOB   CV: No chest pain or Palpitations    Visit Vitals    /70 (BP 1 Location: Left arm, BP Patient Position: Sitting)    Pulse 85    Temp 98 °F (36.7 °C) (Oral)    Resp 16    Ht 5' 10\" (1.778 m)    Wt 194 lb (88 kg)    SpO2 94%    BMI 27.84 kg/m2     Wt Readings from Last 3 Encounters:   04/19/18 194 lb (88 kg)   03/01/18 195 lb (88.5 kg)   12/21/17 195 lb 12.8 oz (88.8 kg)   -1#    BP Readings from Last 3 Encounters:   04/19/18 120/70   03/01/18 128/66   12/21/17 130/80     Physical Examination: General appearance - alert, well appearing, and in no distress  Mental status - alert, oriented to person, place, and time  Eyes - pupils equal and reactive, extraocular eye movements intact  ENT - bilateral external ears and nose normal. Normal lips  Neck - supple, no significant adenopathy, no thyromegaly or mass  Lymphatics - no palpable lymphadenopathy, no hepatosplenomegaly  Chest - clear to auscultation, no wheezes, rales or rhonchi, symmetric air entry  Heart - normal rate, regular rhythm, normal S1, S2, no murmurs, rubs, clicks or gallops  Extremities - peripheral pulses normal, no pedal edema, no clubbing or cyanosis. Pt with vesicular eruption, sparse, to the volar radial base of thumb. R anterior shin with 5mm circular abrasion with central erythema and mildly dilated superficial veins surrounding. A/P:  DM2  Maybe a little overcontrolled. Rec to con't f/u with Endocrine in [address] [place] [address] Drive. Consider checking some early AM BG's to see if she's dipping in her sleep. HTN  In goal, no futher SE's. Con't diovan 80 BID. HLD  With big improvement in LFT's. Con't lipitor 20 every day. Lipids still great. Hypothyroid  mgd by endo. New dose synthroid 75mcg daily put into profile. Dyshidrotic eczema  Skin care ed, use top CS cream PRN itching. Pt can ask for Rx cream if OTC not helping. Abrasion R shin with venous stasis dermatitis  Tx with support stockings and top CS cream, but if not improving or worsening, add bactroban.      F/U 6mo/PRN

## 2018-05-10 ENCOUNTER — TELEPHONE (OUTPATIENT)
Dept: FAMILY MEDICINE CLINIC | Age: 63
End: 2018-05-10

## 2018-05-10 DIAGNOSIS — L30.9 ECZEMA, UNSPECIFIED TYPE: Primary | ICD-10-CM

## 2018-05-10 RX ORDER — FLUOCINONIDE 0.5 MG/G
CREAM TOPICAL
Qty: 60 G | Refills: 2 | Status: SHIPPED | OUTPATIENT
Start: 2018-05-10 | End: 2018-05-15

## 2018-05-10 NOTE — TELEPHONE ENCOUNTER
Patient is calling back as a follow-up request from Dr. Jacinta Burns about the bumps on her thumb. She states they have not improved and now she's getting bumps on her other hand. [place].

## 2018-05-10 NOTE — TELEPHONE ENCOUNTER
Patient states her bumps on her thump you saw on last visit have spread to both hands, no pain just tch at times. The cortisone she finished but did no t help. She uses [employer].

## 2018-05-11 ENCOUNTER — TELEPHONE (OUTPATIENT)
Dept: FAMILY MEDICINE CLINIC | Age: 63
End: 2018-05-11

## 2018-05-11 NOTE — TELEPHONE ENCOUNTER
Pt called back and said a nurse has not gotten back with her about her situation.  Please call [facility]

## 2018-05-14 ENCOUNTER — TELEPHONE (OUTPATIENT)
Dept: FAMILY MEDICINE CLINIC | Age: 63
End: 2018-05-14

## 2018-05-14 NOTE — TELEPHONE ENCOUNTER
Spoke with patient. Unable to see in our office today per provider. Offered to call another office for her she declined x 2. She will get in touch with her endocrinologist and call us back in am if appointment is still needed.

## 2018-05-15 ENCOUNTER — OFFICE VISIT (OUTPATIENT)
Dept: FAMILY MEDICINE CLINIC | Age: 63
End: 2018-05-15

## 2018-05-15 VITALS
BODY MASS INDEX: 27.43 KG/M2 | HEIGHT: 70 IN | SYSTOLIC BLOOD PRESSURE: 140 MMHG | OXYGEN SATURATION: 93 % | TEMPERATURE: 97.4 F | HEART RATE: 96 BPM | RESPIRATION RATE: 18 BRPM | DIASTOLIC BLOOD PRESSURE: 70 MMHG | WEIGHT: 191.6 LBS

## 2018-05-15 DIAGNOSIS — R21 RASH: Primary | ICD-10-CM

## 2018-05-15 DIAGNOSIS — E11.21 TYPE 2 DIABETES WITH NEPHROPATHY (HCC): ICD-10-CM

## 2018-05-15 RX ORDER — TRIAMCINOLONE ACETONIDE 5 MG/G
OINTMENT TOPICAL 2 TIMES DAILY
Qty: 30 G | Refills: 0 | Status: SHIPPED | OUTPATIENT
Start: 2018-05-15 | End: 2018-05-17 | Stop reason: ALTCHOICE

## 2018-05-15 NOTE — MR AVS SNAPSHOT
Juani Chirinos 
 
 
 [address] 
[phone] Patient: Mickie Breen MRN: [id number] :1955 Visit Information Date & Time Provider Department Dept. Phone Encounter #  
 5/15/2018  3:00 PM Carmen Cordova NP [address] [phone] [id number] Upcoming Health Maintenance Date Due FOBT Q 1 YEAR AGE 50-75 10/14/2004 HEMOGLOBIN A1C Q6M 6/15/2018 BREAST CANCER SCRN MAMMOGRAM 2018 Influenza Age 5 to Adult 2018 MICROALBUMIN Q1 2018 EYE EXAM RETINAL OR DILATED Q1 2019 FOOT EXAM Q1 3/1/2019 LIPID PANEL Q1 2019 PAP AKA CERVICAL CYTOLOGY 2019 DTaP/Tdap/Td series (2 - Td) 3/1/2028 Allergies as of 5/15/2018  Review Complete On: 5/15/2018 By: Lucie Vallejo RN Severity Noted Reaction Type Reactions Codeine  2013    Vertigo Doxycycline  2013    Unknown (comments) Lodine [Etodolac]  2013    Hives Sweats, shakes Sulfa (Sulfonamide Antibiotics)  2013    Rash Current Immunizations  Reviewed on 12/15/2016 Name Date Hep A Vaccine (Adult) 2017  1:53 PM, 10/11/2016 Hep B Vaccine (Adult) 2017  1:54 PM, 12/15/2016, 10/11/2016 Influenza High Dose Vaccine PF 10/11/2016 Influenza Vaccine 10/5/2015 12:00 AM, 10/5/2015 12:00 AM  
 Influenza Vaccine (Quad) 10/14/2015  4:46 PM  
 Influenza Vaccine (Quad) PF 10/20/2017 Pneumococcal Conjugate (PCV-13) 3/16/2016 12:00 AM  
 Pneumococcal Polysaccharide (PPSV-23) 2013 Zoster Vaccine, Live 10/14/2014 12:00 AM  
  
 Not reviewed this visit You Were Diagnosed With   
  
 Codes Comments Rash    -  Primary ICD-10-CM: R21 
ICD-9-CM: 782.1 BMI 27.0-27.9,adult     ICD-10-CM: R15.26 ICD-9-CM: V85.23 Vitals BP Pulse Temp Resp Height(growth percentile) 140/70 (BP 1 Location: Left arm, BP Patient Position: Sitting) 96 97.4 °F (36.3 °C) (Temporal) 18 5' 10\" (1.778 m) Weight(growth percentile) SpO2 BMI OB Status Smoking Status 191 lb 9.6 oz (86.9 kg) 93% 27.49 kg/m2 Postmenopausal Former Smoker Vitals History BMI and BSA Data Body Mass Index Body Surface Area  
 27.49 kg/m 2 2.07 m 2 Preferred Pharmacy Pharmacy Name Phone [address], [address] [place] [address] [phone] Your Updated Medication List  
  
   
This list is accurate as of 5/15/18  4:02 PM.  Always use your most recent med list.  
  
  
  
  
 ALPRAZolam 0.5 mg tablet Commonly known as:  XANAX  
TAKE ONE TABLET BY MOUTH EVERY NIGHT AS NEEDED FOR ANXIETY OR SLEEP  
  
 aspirin 81 mg tablet Take 81 mg by mouth daily. atorvastatin 40 mg tablet Commonly known as:  LIPITOR Take 0.5 Tabs by mouth daily. Indications: cholesterol and heart, replaces simastatin BACTRIM PO Take  by mouth.  
  
 calcium-vitamin D 500 mg(1,250mg) -200 unit per tablet Commonly known as:  OYSTER SHELL Take 1 Tab by mouth two (2) times daily (with meals). COMBIGAN 0.2-0.5 % Drop ophthalmic solution Generic drug:  brimonidine-timolol Administer 1 Drop to both eyes two (2) times a day. dapsone 100 mg tablet TAKE ONE TABLET BY MOUTH DAILY  
  
 fluticasone 50 mcg/actuation nasal spray Commonly known as:  FLONASE  
2 sprays in each nostril once a day. glucose blood VI test strips strip Commonly known as:  ASCENSIA AUTODISC VI, ONE TOUCH ULTRA TEST VI  
Use as directed. DX E11.65  
  
 * insulin lispro 100 unit/mL Inph  
by SubCUTAneous route. * HumaLOG KwikPen Insulin 100 unit/mL kwikpen Generic drug:  insulin lispro INJECT 4 units SUBCUTANEOUSLY WITH MEALS AS DIRECTED  
  
 ibuprofen 800 mg tablet Commonly known as:  MOTRIN  
TAKE ONE TABLET BY MOUTH EVERY 6 HOURS WITH FOOD AS NEEDED  Indications: OSTEOARTHRITIS  
  
 insulin syringe-needle U-100 1/2 mL 31 gauge x 15/64\" Syrg Commonly known as:  BD INSULIN SYRINGE ULTRA-FINE  
1 Units by Does Not Apply route daily as needed. DX E11.65  
  
 LANTUS SOLOSTAR U-100 INSULIN 100 unit/mL (3 mL) Inpn Generic drug:  insulin glargine 100 Units by SubCUTAneous route daily. 45 units  
  
 latanoprost 0.005 % ophthalmic solution Commonly known as:  Jillian Cam Administer 1 Drop to both eyes nightly. levothyroxine 75 mcg tablet Commonly known as:  SYNTHROID Take 1 Tab by mouth Daily (before breakfast). meclizine 12.5 mg tablet Commonly known as:  ANTIVERT Take 12.5 mg by mouth three (3) times daily as needed. TAKE 1 PILL A DAY PRN  
  
 metFORMIN  mg tablet Commonly known as:  GLUCOPHAGE XR Take 1 Tab by mouth two (2) times daily (with meals). multivitamin tablet Commonly known as:  ONE A DAY Take 1 Tab by mouth daily. mupirocin 2 % ointment Commonly known as:  [employer] Apply  to affected area two (2) times a day. mycophenolate 500 mg tablet Commonly known as:  CELLCEPT Take 1,000 mg by mouth 2 times daily. Do not chew or crush tablet  
  
 omega 3-DHA-EPA-fish oil 1,000 mg (120 mg-180 mg) capsule Take  by mouth. TAKE TWICE A DAY  
  
 omeprazole 40 mg capsule Commonly known as:  PRILOSEC Take 40 mg by mouth daily. triamcinolone acetonide 0.5 % ointment Commonly known as:  KENALOG Apply  to affected area two (2) times a day. use thin layer  
  
 valsartan 160 mg tablet Commonly known as:  DIOVAN Take 1 Tab by mouth daily. Indications: pressure and kidney  
  
 venlafaxine 100 mg tablet Commonly known as:  EFFEXOR  
TAKE ONE TABLET BY MOUTH 2 TIMES A DAY * Notice: This list has 2 medication(s) that are the same as other medications prescribed for you. Read the directions carefully, and ask your doctor or other care provider to review them with you. Prescriptions Sent to Pharmacy Refills  
 triamcinolone acetonide (KENALOG) 0.5 % ointment 0 Sig: Apply  to affected area two (2) times a day. use thin layer Class: Normal  
 Pharmacy: [address], [address]  #: [phone] Route: Topical  
  
Introducing hospitals & HEALTH SERVICES! [employer] introduces [employer] patient portal. Now you can access parts of your medical record, email your doctor's office, and request medication refills online. 1. In your internet browser, go to [email or web address] 2. Click on the First Time User? Click Here link in the Sign In box. You will see the New Member Sign Up page. 3. Enter your [employer] Access Code exactly as it appears below. You will not need to use this code after youve completed the sign-up process. If you do not sign up before the expiration date, you must request a new code. · [employer] Access Code: [email or web address] Expires: 7/4/2018  8:00 AM 
 
4. Enter the last four digits of your Social Security Number (xxxx) and Date of Birth (mm/dd/yyyy) as indicated and click Submit. You will be taken to the next sign-up page. 5. Create a [employer] ID. This will be your [employer] login ID and cannot be changed, so think of one that is secure and easy to remember. 6. Create a [employer] password. You can change your password at any time. 7. Enter your Password Reset Question and Answer. This can be used at a later time if you forget your password. 8. Enter your e-mail address. You will receive e-mail notification when new information is available in [address] 9. Click Sign Up. You can now view and download portions of your medical record. 10. Click the Download Summary menu link to download a portable copy of your medical information. If you have questions, please visit the Frequently Asked Questions section of the [employer] website.  Remember, [employer] is NOT to be used for urgent needs. For medical emergencies, dial 911. Now available from your iPhone and Android! Please provide this summary of care documentation to your next provider. Your primary care clinician is listed as Noel Vergara. If you have any questions after today's visit, please call [phone].

## 2018-05-15 NOTE — PROGRESS NOTES
1. Have you been to the ER, urgent care clinic since your last visit? Hospitalized since your last visit? No    2. Have you seen or consulted any other health care providers outside of the [facility] since your last visit? Include any pap smears or colon screening.  Yes, endocrinologist, DR Gatito de oliveira , checkup for diabetes  6 weeks ago

## 2018-05-16 ENCOUNTER — TELEPHONE (OUTPATIENT)
Dept: FAMILY MEDICINE CLINIC | Age: 63
End: 2018-05-16

## 2018-05-16 NOTE — TELEPHONE ENCOUNTER
Patient says [facility] has contacted her that we prescribed a cream for her problem but she was under the impression that Dr. Bibiana Smalls and Ralph Villatoro decided not to prescribe this. Please call patient.

## 2018-05-17 NOTE — TELEPHONE ENCOUNTER
Patient says she was never called back about the cream question. I read Juany Ramos comment and patient will cancel Rx with the pharmacy.

## 2018-05-22 NOTE — PROGRESS NOTES
Subjective:     Vandana Tamez is a 60 y.o. female who presents today with the following:  Chief Complaint   Patient presents with    Nail Problem     left big toe,  skin swelling  and tenderness    Skin Problem     lower rt leg       Patient Active Problem List   Diagnosis Code    Pneumonitis, hypersensitivity ([facility] J67.9    Personal history of colonic polyps Z86.010    Postinflammatory pulmonary fibrosis ([facility] J84.10    DVT (deep venous thrombosis) ([facility]) I82.409    Vascular device, implant, or graft complication Z79. 9XXA    Interstitial lung disease ([facility] J84.9    Mixed hyperlipidemia E78.2    Essential hypertension, benign I10    Type 2 diabetes with nephropathy ([facility]) E11.21         COMPLIANT WITH MEDICATION:   HTN; Denies chest pain, dyspnea, palpitations, headache and blurred vision. Blood pressure normotensive. ROS:  Gen: denies fever, chills, fatigue, weight loss, weight gain  HEENT:denies blurry vision, nasal congestion, sore throat  Resp: denies dypsnea, cough, wheezing  CV: denies chest pain radiating to the jaws or arms, palpitations, lower extremity edema  Abd: denies nausea, vomiting, diarrhea, constipation  Neuro: denies numbness/tingling  Endo: denies polyuria, polydipsia, heat/cold intolerance  Heme: no lymphadenopathy  Spot on R shin  Started with a small abrasion about six weeks ago. Red puffy, tender. Has been cleaning it with peroxide and alcohol. Seen by Dr. Fili Alejandro Tx with support stockings and CS cream and thin layer of Bactroban. Allergies   Allergen Reactions    Codeine Vertigo    Doxycycline Unknown (comments)    Lodine [Etodolac] Hives     Sweats, shakes    Sulfa (Sulfonamide Antibiotics) Rash         Current Outpatient Prescriptions:     LANTUS SOLOSTAR U-100 INSULIN 100 unit/mL (3 mL) inpn, 100 Units by SubCUTAneous route daily. 45 units, Disp: , Rfl:     levothyroxine (SYNTHROID) 75 mcg tablet, Take 1 Tab by mouth Daily (before breakfast). , Disp: 90 Tab, Rfl: 3    mupirocin (BACTROBAN) 2 % ointment, Apply  to affected area two (2) times a day., Disp: 22 g, Rfl: 1    ALPRAZolam (XANAX) 0.5 mg tablet, TAKE ONE TABLET BY MOUTH EVERY NIGHT AS NEEDED FOR ANXIETY OR SLEEP, Disp: 45 Tab, Rfl: 0    venlafaxine (EFFEXOR) 100 mg tablet, TAKE ONE TABLET BY MOUTH 2 TIMES A DAY, Disp: 180 Tab, Rfl: 3    dapsone 100 mg tablet, TAKE ONE TABLET BY MOUTH DAILY, Disp: 90 Tab, Rfl: 3    COMBIGAN 0.2-0.5 % drop ophthalmic solution, Administer 1 Drop to both eyes two (2) times a day., Disp: , Rfl:     valsartan (DIOVAN) 160 mg tablet, Take 1 Tab by mouth daily. Indications: pressure and kidney, Disp: 30 Tab, Rfl: 4    atorvastatin (LIPITOR) 40 mg tablet, Take 0.5 Tabs by mouth daily. Indications: cholesterol and heart, replaces simastatin, Disp: 90 Tab, Rfl: 3    ibuprofen (MOTRIN) 800 mg tablet, TAKE ONE TABLET BY MOUTH EVERY 6 HOURS WITH FOOD AS NEEDED  Indications: OSTEOARTHRITIS, Disp: 90 Tab, Rfl: 0    insulin lispro 100 unit/mL inph, by SubCUTAneous route., Disp: , Rfl:     SULFAMETHOXAZOLE/TRIMETHOPRIM (BACTRIM PO), Take  by mouth., Disp: , Rfl:     mycophenolate (CELLCEPT) 500 mg tablet, Take 1,000 mg by mouth 2 times daily. Do not chew or crush tablet, Disp: 360 Tab, Rfl: 3    omeprazole (PRILOSEC) 40 mg capsule, Take 40 mg by mouth daily. , Disp: 90 Cap, Rfl: 1    HUMALOG KWIKPEN 100 unit/mL kwikpen, INJECT 4 units SUBCUTANEOUSLY WITH MEALS AS DIRECTED, Disp: 15 mL, Rfl: 0    fluticasone (FLONASE) 50 mcg/actuation nasal spray, 2 sprays in each nostril once a day., Disp: 16 g, Rfl: 3    metFORMIN ER (GLUCOPHAGE XR) 500 mg tablet, Take 1 Tab by mouth two (2) times daily (with meals). (Patient taking differently: Take 1,000 mg by mouth two (2) times daily (with meals). ), Disp: 60 Tab, Rfl: 10    insulin syringe-needle U-100 (BD INSULIN SYRINGE ULTRA-FINE) 1/2 mL 31 gauge x 15/64\" syrg, 1 Units by Does Not Apply route daily as needed.  DX E11.65, Disp: 90 Pen Needle, Rfl: 1 yr    glucose blood VI test strips (ASCENSIA AUTODISC VI, ONE TOUCH ULTRA TEST VI) strip, Use as directed. DX E11.65, Disp: 100 Strip, Rfl: 1 yr    calcium-vitamin D (OYSTER SHELL) 500 mg(1,250mg) -200 unit per tablet, Take 1 Tab by mouth two (2) times daily (with meals). , Disp: , Rfl:     meclizine (ANTIVERT) 12.5 mg tablet, Take 12.5 mg by mouth three (3) times daily as needed. TAKE 1 PILL A DAY PRN, Disp: , Rfl:     latanoprost (XALATAN) 0.005 % ophthalmic solution, Administer 1 Drop to both eyes nightly., Disp: , Rfl:     multivitamin (ONE A DAY) tablet, Take 1 Tab by mouth daily. , Disp: , Rfl:     Omega-3-DHA-EPA-Fish Oil 1,000 (120-180) mg cap, Take  by mouth. TAKE TWICE A DAY, Disp: , Rfl:     aspirin 81 mg tablet, Take 81 mg by mouth daily. , Disp: , Rfl:     Past Medical History:   Diagnosis Date    Colon polyps     Depression     Diverticulosis     Fatty liver 2009    due to steroids    GERD (gastroesophageal reflux disease)     Glaucoma     Interstitial lung disease (HCC)     Menopause     onset at age 49    Mild dysplasia of cervix 1984    Palpitations     Peripheral artery disease ([address]     stent behind left knee.     Pneumonia 2013    hospitalized for 8 days    Pneumonitis 2007    Type 2 diabetes mellitus ([address]     2007    Undiagnosed cardiac murmurs        Past Surgical History:   Procedure Laterality Date    HX CARPAL TUNNEL RELEASE  1/2000    bilateral    HX COLONOSCOPY  1/2006, 2013    polyps present    HX GYN  1/1984    Cervical conization    HX HEART CATHETERIZATION  09/2016    [facility]-normal coronaries, normal LV function, normal hemodynamics    HX KNEE ARTHROSCOPY  1/1995    HX KNEE ARTHROSCOPY  1/2000    HX ORTHOPAEDIC      bilat. knee surgery    HX ORTHOPAEDIC Right 12/27/2016    A1 pulley release of the right middle finger    HX OTHER SURGICAL  2007    lung biopsy    VASCULAR SURGERY PROCEDURE UNLIST  10/14    left leg clot; s/p stent        History   Smoking Status    Former Smoker    Packs/day: 0.80    Years: 25.00    Types: Cigarettes    Quit date: 10/1/2000   Smokeless Tobacco    Never Used       Social History     Social History    Marital status:      Spouse name: N/A    Number of children: N/A    Years of education: N/A     Social History Main Topics    Smoking status: Former Smoker     Packs/day: 0.80     Years: 25.00     Types: Cigarettes     Quit date: 10/1/2000    Smokeless tobacco: Never Used    Alcohol use 4.2 oz/week     7 Standard drinks or equivalent per week    Drug use: No    Sexual activity: Yes     Partners: Male     Other Topics Concern    None     Social History Narrative    Happily . No concern for abuse. Exercise: none    Diet: Careful. Wear Seatbelts               Family History   Problem Relation Age of Onset    Breast Cancer Mother     Cancer Mother      multiple myeloma    Osteoporosis Mother     Deep Vein Thrombosis Mother     Hypertension Mother     Heart Disease Father     Cancer Maternal Grandmother      multiple myeloma    Cancer Sister      Melanoma         Objective:     Visit Vitals    /70 (BP 1 Location: Left arm, BP Patient Position: Sitting)    Pulse 96    Temp 97.4 °F (36.3 °C) (Temporal)    Resp 18    Ht 5' 10\" (1.778 m)    Wt 191 lb 9.6 oz (86.9 kg)    SpO2 93%    BMI 27.49 kg/m2     Body mass index is 27.49 kg/(m^2). General: Alert and oriented. No acute distress. Well nourished  HEENT :  Ears:TMs are normal. Canals are clear. Eyes: pupils equal, round, react to light and accommodation. Extra ocular movements intact. Nose: patent. Mouth and throat is clear. Neck:supple full range of motion no thyromegaly. Trachea midline, No carotid bruits. No significant lymphadenopathy  Lungs[de-identified] clear to auscultation without wheezes, rales, or rhonchi. Heart :RRR, S1 & S2 are normal intensity.  No murmur; no gallop  Extremities: without clubbing, cyanosis, or edema  Pulses: radial and femoral pulses are normal  Neuro: HMF intact. Cranial nerves II through XII grossly normal.  Skin:  R anterior shin with 2 mm circular abrasion with central erythema and mildly dilated superficial veins surrounding        No results found for this visit on 05/15/18. Assessment/ Plan:     1. BMI 27.0-27.9,adult  Discussed the patient's BMI with her. The BMI follow up plan is as follows:     dietary management education, guidance, and counseling  encourage exercise  monitor weight  prescribed dietary intake    An After Visit Summary was printed and given to the patient. 2. Rash  Keep area clean open to air. Orders Placed This Encounter    DISCONTD: triamcinolone acetonide (KENALOG) 0.5 % ointment     Sig: Apply  to affected area two (2) times a day. use thin layer     Dispense:  30 g     Refill:  0         Verbal and written instructions (see AVS) provided.  Patient expresses understanding of diagnosis and treatment plan. Health Maintenance Due   Topic Date Due    FOBT Q 1 YEAR AGE 50-75  10/14/2004    HEMOGLOBIN A1C Q6M  06/15/2018    BREAST CANCER SCRN MAMMOGRAM  08/17/2018         Follow-up Disposition:  Return in about 4 months (around 9/15/2018).       Jose Junior, FNP-C

## 2018-05-22 NOTE — ACP (ADVANCE CARE PLANNING)
Discussed importance of advanced medical directives with patient. Patient is capable of making decisions.   Jo Ann Soriano NP-C

## 2018-05-30 ENCOUNTER — TELEPHONE (OUTPATIENT)
Dept: FAMILY MEDICINE CLINIC | Age: 63
End: 2018-05-30

## 2018-05-30 NOTE — TELEPHONE ENCOUNTER
Pt received her atorvastatin by mail order and she said on the bottle she is to take a whole tablet daily but she was told by Dr. Fili Alejandro to take 1/2 a tablet daily. I told the pt I would have a nurse call to verify that to be on the safe side. Call Jean-Paul.

## 2018-06-18 ENCOUNTER — TELEPHONE (OUTPATIENT)
Dept: FAMILY MEDICINE CLINIC | Age: 63
End: 2018-06-18

## 2018-06-18 NOTE — TELEPHONE ENCOUNTER
9:15 - SW Ms. Samanta Morris and she stated that she started with a small rash on her chest about a week ago. It has now gotten larger and is spreading to her arms. She has been using Cortisone 10 and Benadryl, but is not helping. I informed her that [facility] is booked and does not have anything for today, but I am willing to call one of our other offices to see if she can be seen. She stated no and that she would rather wait if she can be seen on tomorrow. I Yarelis Azar and she wanted make sure that she hadn't change up with her detergent or deodorant or any other chemical changes? She stated no. I informed her that a next day appointment normally is called in on the same morning at 7:30, but will see if Jaredmaida Avila wants to go ahead and put her in. She stated OK. Per [place], she can be seen on tomorrow afternoon at 2 pm.    Ms Samanta Morris was a little upset at first about the time, but did call back per Liliana Hughes / [place] and took the appointment.

## 2018-06-19 ENCOUNTER — OFFICE VISIT (OUTPATIENT)
Dept: FAMILY MEDICINE CLINIC | Age: 63
End: 2018-06-19

## 2018-06-19 VITALS
SYSTOLIC BLOOD PRESSURE: 120 MMHG | HEART RATE: 82 BPM | WEIGHT: 191.6 LBS | DIASTOLIC BLOOD PRESSURE: 78 MMHG | OXYGEN SATURATION: 93 % | HEIGHT: 70 IN | BODY MASS INDEX: 27.43 KG/M2 | TEMPERATURE: 96.8 F

## 2018-06-19 DIAGNOSIS — R21 RASH: Primary | ICD-10-CM

## 2018-06-19 NOTE — PROGRESS NOTES
1. Have you been to the ER, urgent care clinic since your last visit? Hospitalized since your last visit? No    2. Have you seen or consulted any other health care providers outside of the [address] [place] [address] since your last visit? Include any pap smears or colon screening.  DR PARSON,urologist for UTI, 6-

## 2018-06-19 NOTE — MR AVS SNAPSHOT
[address] [place] [address] 
[phone] Patient: Lopez Guerrero MRN: [id number] :1955 Visit Information Date & Time Provider Department Dept. Phone Encounter #  
 2018  2:00 PM Gypsy Sampson NP [facility] [address] [facility] [phone] [id number] Upcoming Health Maintenance Date Due FOBT Q 1 YEAR AGE 50-75 10/14/2004 HEMOGLOBIN A1C Q6M 6/15/2018 BREAST CANCER SCRN MAMMOGRAM 2018 Influenza Age 5 to Adult 2018 MICROALBUMIN Q1 2018 EYE EXAM RETINAL OR DILATED Q1 2019 FOOT EXAM Q1 3/1/2019 LIPID PANEL Q1 2019 PAP AKA CERVICAL CYTOLOGY 2019 DTaP/Tdap/Td series (2 - Td) 3/1/2028 Allergies as of 2018  Review Complete On: 2018 By: Dayan Lopez RN Severity Noted Reaction Type Reactions Codeine  2013    Vertigo Doxycycline  2013    Unknown (comments) Lodine [Etodolac]  2013    Hives Sweats, shakes Sulfa (Sulfonamide Antibiotics)  2013    Rash Current Immunizations  Reviewed on 12/15/2016 Name Date Hep A Vaccine (Adult) 2017  1:53 PM, 10/11/2016 Hep B Vaccine (Adult) 2017  1:54 PM, 12/15/2016, 10/11/2016 Influenza High Dose Vaccine PF 10/11/2016 Influenza Vaccine 10/5/2015 12:00 AM, 10/5/2015 12:00 AM  
 Influenza Vaccine (Quad) 10/14/2015  4:46 PM  
 Influenza Vaccine (Quad) PF 10/20/2017 Pneumococcal Conjugate (PCV-13) 3/16/2016 12:00 AM  
 Pneumococcal Polysaccharide (PPSV-23) 2013 Zoster Vaccine, Live 10/14/2014 12:00 AM  
  
 Not reviewed this visit You Were Diagnosed With   
  
 Codes Comments Rash    -  Primary ICD-10-CM: R21 
ICD-9-CM: 782.1 Vitals  BP Pulse Temp Height(growth percentile) Weight(growth percentile) SpO2  
 120/78 (BP 1 Location: Left arm, BP Patient Position: Sitting) 82 96.8 °F (36 °C) (Temporal) 5' 10\" (1.778 m) 191 lb 9.6 oz (86.9 kg) 93% BMI OB Status Smoking Status 27.49 kg/m2 Postmenopausal Former Smoker BMI and BSA Data Body Mass Index Body Surface Area  
 27.49 kg/m 2 2.07 m 2 Preferred Pharmacy Pharmacy Name Phone [phone], [place] [phone] Your Updated Medication List  
  
   
This list is accurate as of 6/19/18  3:42 PM.  Always use your most recent med list.  
  
  
  
  
 ALPRAZolam 0.5 mg tablet Commonly known as:  XANAX  
TAKE ONE TABLET BY MOUTH EVERY NIGHT AS NEEDED FOR ANXIETY OR SLEEP  
  
 aspirin 81 mg tablet Take 81 mg by mouth daily. atorvastatin 40 mg tablet Commonly known as:  LIPITOR Take 0.5 Tabs by mouth daily. Indications: cholesterol and heart, replaces simastatin BACTRIM PO Take  by mouth.  
  
 calcium-vitamin D 500 mg(1,250mg) -200 unit per tablet Commonly known as:  OYSTER SHELL Take 1 Tab by mouth two (2) times daily (with meals). COMBIGAN 0.2-0.5 % Drop ophthalmic solution Generic drug:  brimonidine-timolol Administer 1 Drop to both eyes two (2) times a day. dapsone 100 mg tablet TAKE ONE TABLET BY MOUTH DAILY  
  
 fluticasone 50 mcg/actuation nasal spray Commonly known as:  FLONASE  
2 sprays in each nostril once a day. glucose blood VI test strips strip Commonly known as:  ASCENSIA AUTODISC VI, ONE TOUCH ULTRA TEST VI  
Use as directed. DX E11.65  
  
 * insulin lispro 100 unit/mL Inph  
by SubCUTAneous route. * HumaLOG KwikPen Insulin 100 unit/mL kwikpen Generic drug:  insulin lispro INJECT 4 units SUBCUTANEOUSLY WITH MEALS AS DIRECTED  
  
 ibuprofen 800 mg tablet Commonly known as:  MOTRIN  
TAKE ONE TABLET BY MOUTH EVERY 6 HOURS WITH FOOD AS NEEDED  Indications: OSTEOARTHRITIS  
  
 insulin syringe-needle U-100 1/2 mL 31 gauge x 15/64\" Syrg Commonly known as:  BD INSULIN SYRINGE ULTRA-FINE  
1 Units by Does Not Apply route daily as needed. DX E11.65  
  
 LANTUS SOLOSTAR U-100 INSULIN 100 unit/mL (3 mL) Inpn Generic drug:  insulin glargine 100 Units by SubCUTAneous route daily. 45 units  
  
 latanoprost 0.005 % ophthalmic solution Commonly known as:  Alex Celis Administer 1 Drop to both eyes nightly. levothyroxine 75 mcg tablet Commonly known as:  SYNTHROID Take 1 Tab by mouth Daily (before breakfast). meclizine 12.5 mg tablet Commonly known as:  ANTIVERT Take 12.5 mg by mouth three (3) times daily as needed. TAKE 1 PILL A DAY PRN  
  
 metFORMIN  mg tablet Commonly known as:  GLUCOPHAGE XR Take 1 Tab by mouth two (2) times daily (with meals). multivitamin tablet Commonly known as:  ONE A DAY Take 1 Tab by mouth daily. mupirocin 2 % ointment Commonly known as:  [employer] Apply  to affected area two (2) times a day. mycophenolate 500 mg tablet Commonly known as:  CELLCEPT Take 1,000 mg by mouth 2 times daily. Do not chew or crush tablet  
  
 omega 3-DHA-EPA-fish oil 1,000 mg (120 mg-180 mg) capsule Take  by mouth. TAKE TWICE A DAY  
  
 omeprazole 40 mg capsule Commonly known as:  PRILOSEC Take 40 mg by mouth daily. triamcinolone acetonide 10 mg/mL injection Commonly known as:  KENALOG 2 mL by IntraMUSCular route once for 1 dose. valsartan 160 mg tablet Commonly known as:  DIOVAN Take 1 Tab by mouth daily. Indications: pressure and kidney  
  
 venlafaxine 100 mg tablet Commonly known as:  EFFEXOR  
TAKE ONE TABLET BY MOUTH 2 TIMES A DAY * Notice: This list has 2 medication(s) that are the same as other medications prescribed for you. Read the directions carefully, and ask your doctor or other care provider to review them with you. We Performed the Following TRIAMCINOLONE ACETONIDE INJ [ [facility]] Introducing [facility] & [facility] SERVICES! Dear Pavithra : Thank you for requesting a [employer] account. Our records indicate that you already have an active [employer] account. You can access your account anytime at [email or web address] Did you know that you can access your hospital and ER discharge instructions at any time in [employer]? You can also review all of your test results from your hospital stay or ER visit. Additional Information If you have questions, please visit the Frequently Asked Questions section of the [employer] website at [email or web address] Remember, [employer] is NOT to be used for urgent needs. For medical emergencies, dial 911. Now available from your iPhone and Android! Please provide this summary of care documentation to your next provider. Your primary care clinician is listed as Yoly Monsalve. If you have any questions after today's visit, please call [phone].

## 2018-06-21 ENCOUNTER — TELEPHONE (OUTPATIENT)
Dept: FAMILY MEDICINE CLINIC | Age: 63
End: 2018-06-21

## 2018-06-21 DIAGNOSIS — R21 RASH: Primary | ICD-10-CM

## 2018-06-21 RX ORDER — TRIAMCINOLONE ACETONIDE 1 MG/G
CREAM TOPICAL 2 TIMES DAILY
Qty: 15 G | Refills: 0 | Status: SHIPPED | OUTPATIENT
Start: 2018-06-21 | End: 2018-07-02 | Stop reason: SDUPTHER

## 2018-06-21 RX ORDER — CETIRIZINE HCL 10 MG
10 TABLET ORAL
Qty: 30 TAB | Refills: 1 | Status: SHIPPED | OUTPATIENT
Start: 2018-06-21 | End: 2018-09-19 | Stop reason: ALTCHOICE

## 2018-06-21 NOTE — TELEPHONE ENCOUNTER
SW Ms. Tucker Costa, she stated that Murtis Dioni had stated to her that if she did not get any better to let her know and that she will come up with something else to try. Ms Tucker Costa stated that she does not have the time or thinks that she should have to come back in to be seen. [facility] for advice.

## 2018-06-21 NOTE — TELEPHONE ENCOUNTER
----- Message from Charol Party sent at 6/21/2018  8:54 AM EDT -----  Regarding: Dr. Trina Pino Telephone  Patient stated the shot did not help with her rash, wanted to see what else she can do. Contact is  [phone]

## 2018-06-25 NOTE — PROGRESS NOTES
Subjective:      Charla Lesch is a 60 y.o. female who presents for evaluation of rash. Rash started 3 days ago. Lesions are pink in color, are of raised textrure, 1 by 1 cm in size. Initial distribution: armsbilateral, back, torso. Rash has changed over time. Discomfort associated with rash: pruritic. Associated symptoms: no associated symptoms. Denies: no associated symptoms. Patient has not had previous evaluation of rash. Patient has not had previous treatment except OTC self treated with Cortisone 10 and benadryl. Betha Lute Response to treatment: temporary relief. Patient has not had contacts with similar rash. Patient has not identified precipitant. Patient has not had new exposures (soaps, lotions, laundry detergents, foods, medications, plants, insects or animals.)    Review of Systems  Pertinent items are noted in HPI. Objective:     Visit Vitals    /78 (BP 1 Location: Left arm, BP Patient Position: Sitting)    Pulse 82    Temp 96.8 °F (36 °C) (Temporal)    Ht 5' 10\" (1.778 m)    Wt 191 lb 9.6 oz (86.9 kg)    SpO2 93%    BMI 27.49 kg/m2     General:  alert, cooperative, no distress, appears stated age   Primary findings: papules   Lesion Size: 1 cm x 1 cm. Color of lesions:  erythematous   Blanching: yes   Configuration: annular   Secondary Features:  no secondary findings   Distribution:  Generalized , arms and trunk   Lymph Nodes:  no regional adenopathy     Assessment:     Atopic dermatitis  Hives    Plan:   Aveeno baths  Benadryl prn for itching. Reassurance was given to the patient. Referral to Dermatology if unimproved.   Tylenol or Ibuprofen for pain, fever

## 2018-06-25 NOTE — ACP (ADVANCE CARE PLANNING)
Discussed importance of advanced medical directives with patient. Patient is capable of making decisions.   Sd Neff NP-C

## 2018-07-02 RX ORDER — TRIAMCINOLONE ACETONIDE 1 MG/G
CREAM TOPICAL
Qty: 15 G | Refills: 0 | Status: SHIPPED | OUTPATIENT
Start: 2018-07-02 | End: 2018-09-19 | Stop reason: ALTCHOICE

## 2018-08-02 ENCOUNTER — TELEPHONE (OUTPATIENT)
Dept: FAMILY MEDICINE CLINIC | Age: 63
End: 2018-08-02

## 2018-08-02 NOTE — TELEPHONE ENCOUNTER
Mrs. Mallorie Bañuelos needs to have stitches removed on 8/8/2018 from surgery she had from the Dermatologist.    [phone]

## 2018-08-08 ENCOUNTER — OFFICE VISIT (OUTPATIENT)
Dept: FAMILY MEDICINE CLINIC | Age: 63
End: 2018-08-08

## 2018-08-08 VITALS
OXYGEN SATURATION: 91 % | WEIGHT: 192 LBS | HEART RATE: 71 BPM | HEIGHT: 70 IN | RESPIRATION RATE: 15 BRPM | DIASTOLIC BLOOD PRESSURE: 65 MMHG | SYSTOLIC BLOOD PRESSURE: 116 MMHG | TEMPERATURE: 98.1 F | BODY MASS INDEX: 27.49 KG/M2

## 2018-08-08 DIAGNOSIS — Z48.02 ENCOUNTER FOR REMOVAL OF SUTURES: Primary | ICD-10-CM

## 2018-08-08 PROBLEM — D03.9 MELANOMA IN SITU (HCC): Status: ACTIVE | Noted: 2018-07-01

## 2018-08-08 NOTE — MR AVS SNAPSHOT
[address] [place] [address] [address]  
[phone] Patient: Jarod Peters MRN: [id number] :1955 Visit Information Date & Time Provider Department Dept. Phone Encounter #  
 2018  8:30 AM Jeanmarie Bueno MD [address] [place] [address] [phone] [id number] Upcoming Health Maintenance Date Due FOBT Q 1 YEAR AGE 50-75 10/14/2004 Influenza Age 5 to Adult 2018 BREAST CANCER SCRN MAMMOGRAM 2018 HEMOGLOBIN A1C Q6M 2018 EYE EXAM RETINAL OR DILATED Q1 2019 FOOT EXAM Q1 3/1/2019 LIPID PANEL Q1 2019 MICROALBUMIN Q1 2019 PAP AKA CERVICAL CYTOLOGY 2019 DTaP/Tdap/Td series (2 - Td) 3/1/2028 Allergies as of 2018  Review Complete On: 2018 By: Ross Gray  
  
 Severity Noted Reaction Type Reactions Codeine  2013    Vertigo Doxycycline  2013    Unknown (comments) Lodine [Etodolac]  2013    Hives Sweats, shakes Sulfa (Sulfonamide Antibiotics)  2013    Rash Current Immunizations  Reviewed on 12/15/2016 Name Date Hep A Vaccine (Adult) 2017  1:53 PM, 10/11/2016 Hep B Vaccine (Adult) 2017  1:54 PM, 12/15/2016, 10/11/2016 Influenza High Dose Vaccine PF 10/11/2016 Influenza Vaccine 10/5/2015 12:00 AM, 10/5/2015 12:00 AM  
 Influenza Vaccine (Quad) 10/14/2015  4:46 PM  
 Influenza Vaccine (Quad) PF 10/20/2017 Pneumococcal Conjugate (PCV-13) 3/16/2016 12:00 AM  
 Pneumococcal Polysaccharide (PPSV-23) 2013 Zoster Vaccine, Live 10/14/2014 12:00 AM  
  
 Not reviewed this visit Vitals BP Pulse Temp Resp Height(growth percentile) Weight(growth percentile) 116/65 (BP 1 Location: Left arm, BP Patient Position: Sitting) 71 98.1 °F (36.7 °C) (Oral) 15 5' 10\" (1.778 m) 192 lb (87.1 kg) SpO2 BMI OB Status Smoking Status 91% 27.55 kg/m2 Postmenopausal Former Smoker BMI and BSA Data Body Mass Index Body Surface Area  
 27.55 kg/m 2 2.07 m 2 Preferred Pharmacy Pharmacy Name Phone [phone], [place] [address] [phone] Your Updated Medication List  
  
   
This list is accurate as of 8/8/18  9:14 AM.  Always use your most recent med list.  
  
  
  
  
 ALPRAZolam 0.5 mg tablet Commonly known as:  XANAX  
TAKE ONE TABLET BY MOUTH EVERY NIGHT AS NEEDED FOR ANXIETY OR SLEEP  
  
 aspirin 81 mg tablet Take 81 mg by mouth daily. atorvastatin 40 mg tablet Commonly known as:  LIPITOR Take 0.5 Tabs by mouth daily. Indications: cholesterol and heart, replaces simastatin BACTRIM PO Take  by mouth.  
  
 calcium-vitamin D 500 mg(1,250mg) -200 unit per tablet Commonly known as:  OYSTER SHELL Take 1 Tab by mouth two (2) times daily (with meals). cetirizine 10 mg tablet Commonly known as:  ZYRTEC Take 1 Tab by mouth daily as needed for Allergies. Indications: Atopic Dermatitis COMBIGAN 0.2-0.5 % Drop ophthalmic solution Generic drug:  brimonidine-timolol Administer 1 Drop to both eyes two (2) times a day. dapsone 100 mg tablet TAKE ONE TABLET BY MOUTH DAILY  
  
 fluticasone 50 mcg/actuation nasal spray Commonly known as:  FLONASE  
2 sprays in each nostril once a day. glucose blood VI test strips strip Commonly known as:  ASCENSIA AUTODISC VI, ONE TOUCH ULTRA TEST VI  
Use as directed. DX E11.65  
  
 * insulin lispro 100 unit/mL Inph  
by SubCUTAneous route. * HumaLOG KwikPen Insulin 100 unit/mL kwikpen Generic drug:  insulin lispro INJECT 4 units SUBCUTANEOUSLY WITH MEALS AS DIRECTED  
  
 ibuprofen 800 mg tablet Commonly known as:  MOTRIN  
TAKE ONE TABLET BY MOUTH EVERY 6 HOURS WITH FOOD AS NEEDED  Indications: OSTEOARTHRITIS  
  
 insulin syringe-needle U-100 1/2 mL 31 gauge x 15/64\" Syrg Commonly known as:  BD INSULIN SYRINGE ULTRA-FINE  
1 Units by Does Not Apply route daily as needed. DX E11.65  
  
 LANTUS SOLOSTAR U-100 INSULIN 100 unit/mL (3 mL) Inpn Generic drug:  insulin glargine 100 Units by SubCUTAneous route daily. 45 units  
  
 latanoprost 0.005 % ophthalmic solution Commonly known as:  Tonye Rist Administer 1 Drop to both eyes nightly. levothyroxine 75 mcg tablet Commonly known as:  SYNTHROID Take 1 Tab by mouth Daily (before breakfast). meclizine 12.5 mg tablet Commonly known as:  ANTIVERT Take 12.5 mg by mouth three (3) times daily as needed. TAKE 1 PILL A DAY PRN  
  
 metFORMIN  mg tablet Commonly known as:  GLUCOPHAGE XR Take 1 Tab by mouth two (2) times daily (with meals). multivitamin tablet Commonly known as:  ONE A DAY Take 1 Tab by mouth daily. mupirocin 2 % ointment Commonly known as:  [employer] Apply  to affected area two (2) times a day. mycophenolate 500 mg tablet Commonly known as:  CELLCEPT Take 1,000 mg by mouth 2 times daily. Do not chew or crush tablet  
  
 omega 3-DHA-EPA-fish oil 1,000 mg (120 mg-180 mg) capsule Take  by mouth. TAKE TWICE A DAY  
  
 omeprazole 40 mg capsule Commonly known as:  PRILOSEC Take 40 mg by mouth daily. triamcinolone acetonide 0.1 % topical cream  
Commonly known as:  KENALOG  
APPLY A THIN LAYER TO AFFECTED AREA 2 TIMES A DAY  
  
 valsartan 160 mg tablet Commonly known as:  DIOVAN Take 1 Tab by mouth daily. Indications: pressure and kidney  
  
 venlafaxine 100 mg tablet Commonly known as:  EFFEXOR  
TAKE ONE TABLET BY MOUTH 2 TIMES A DAY * Notice: This list has 2 medication(s) that are the same as other medications prescribed for you. Read the directions carefully, and ask your doctor or other care provider to review them with you. Introducing [facility] & HEALTH SERVICES! Dear Fabienne Sanchez: Thank you for requesting a [employer] account. Our records indicate that you already have an active [employer] account. You can access your account anytime at [email or web address] Did you know that you can access your hospital and ER discharge instructions at any time in [employer]? You can also review all of your test results from your hospital stay or ER visit. Additional Information If you have questions, please visit the Frequently Asked Questions section of the [employer] website at [email or web address] Remember, [employer] is NOT to be used for urgent needs. For medical emergencies, dial 911. Now available from your iPhone and Android! Please provide this summary of care documentation to your next provider. Your primary care clinician is listed as Sandeep Moreau. If you have any questions after today's visit, please call [phone].

## 2018-08-08 NOTE — PROGRESS NOTES
Chief Complaint   Patient presents with    Suture Removal     Left cheek (facial)         HPI:         is a 60 y.o. female who notes the onset of a skin problem. The details are as follows:  Melanoma in situ removed in [place]. Her dermatologist requested that we remove the stitches today      Allergies   Allergen Reactions    Codeine Vertigo    Doxycycline Unknown (comments)    Lodine [Etodolac] Hives     Sweats, shakes    Sulfa (Sulfonamide Antibiotics) Rash       Current Outpatient Prescriptions   Medication Sig    cetirizine (ZYRTEC) 10 mg tablet Take 1 Tab by mouth daily as needed for Allergies. Indications: Atopic Dermatitis    LANTUS SOLOSTAR U-100 INSULIN 100 unit/mL (3 mL) inpn 100 Units by SubCUTAneous route daily. 45 units    levothyroxine (SYNTHROID) 75 mcg tablet Take 1 Tab by mouth Daily (before breakfast).  ALPRAZolam (XANAX) 0.5 mg tablet TAKE ONE TABLET BY MOUTH EVERY NIGHT AS NEEDED FOR ANXIETY OR SLEEP    venlafaxine (EFFEXOR) 100 mg tablet TAKE ONE TABLET BY MOUTH 2 TIMES A DAY    dapsone 100 mg tablet TAKE ONE TABLET BY MOUTH DAILY    COMBIGAN 0.2-0.5 % drop ophthalmic solution Administer 1 Drop to both eyes two (2) times a day.  valsartan (DIOVAN) 160 mg tablet Take 1 Tab by mouth daily. Indications: pressure and kidney    atorvastatin (LIPITOR) 40 mg tablet Take 0.5 Tabs by mouth daily. Indications: cholesterol and heart, replaces simastatin    ibuprofen (MOTRIN) 800 mg tablet TAKE ONE TABLET BY MOUTH EVERY 6 HOURS WITH FOOD AS NEEDED  Indications: OSTEOARTHRITIS    insulin lispro 100 unit/mL inph by SubCUTAneous route.  SULFAMETHOXAZOLE/TRIMETHOPRIM (BACTRIM PO) Take  by mouth.  mycophenolate (CELLCEPT) 500 mg tablet Take 1,000 mg by mouth 2 times daily. Do not chew or crush tablet    omeprazole (PRILOSEC) 40 mg capsule Take 40 mg by mouth daily.     HUMALOG KWIKPEN 100 unit/mL kwikpen INJECT 4 units SUBCUTANEOUSLY WITH MEALS AS DIRECTED    fluticasone (FLONASE) 50 mcg/actuation nasal spray 2 sprays in each nostril once a day.  metFORMIN ER (GLUCOPHAGE XR) 500 mg tablet Take 1 Tab by mouth two (2) times daily (with meals). (Patient taking differently: Take 1,000 mg by mouth two (2) times daily (with meals). )    insulin syringe-needle U-100 (BD INSULIN SYRINGE ULTRA-FINE) 1/2 mL 31 gauge x 15/64\" syrg 1 Units by Does Not Apply route daily as needed. DX E11.65    glucose blood VI test strips (ASCENSIA AUTODISC VI, ONE TOUCH ULTRA TEST VI) strip Use as directed. DX E11.65    calcium-vitamin D (OYSTER SHELL) 500 mg(1,250mg) -200 unit per tablet Take 1 Tab by mouth two (2) times daily (with meals).  meclizine (ANTIVERT) 12.5 mg tablet Take 12.5 mg by mouth three (3) times daily as needed. TAKE 1 PILL A DAY PRN    latanoprost (XALATAN) 0.005 % ophthalmic solution Administer 1 Drop to both eyes nightly.  multivitamin (ONE A DAY) tablet Take 1 Tab by mouth daily.  Omega-3-DHA-EPA-Fish Oil 1,000 (120-180) mg cap Take  by mouth. TAKE TWICE A DAY    aspirin 81 mg tablet Take 81 mg by mouth daily.  triamcinolone acetonide (KENALOG) 0.1 % topical cream APPLY A THIN LAYER TO AFFECTED AREA 2 TIMES A DAY    mupirocin (BACTROBAN) 2 % ointment Apply  to affected area two (2) times a day. No current facility-administered medications for this visit. Past Medical History:   Diagnosis Date    Colon polyps     Depression     Diverticulosis     Fatty liver 2009    due to steroids    GERD (gastroesophageal reflux disease)     Glaucoma     Interstitial lung disease (HCC)     Melanoma of face ([facility] [address] 08/08/2018    Left cheek    Menopause     onset at age 49    Mild dysplasia of cervix 1984    Palpitations     Peripheral artery disease ([facility] [address]     stent behind left knee.     Pneumonia 2013    hospitalized for 8 days    Pneumonitis 2007    Type 2 diabetes mellitus ([facility] [address]     2007    Undiagnosed cardiac murmurs          ROS:  Denies fever, chills, cough, chest pain, SOB,  nausea, vomiting, or diarrhea. Denies wt loss, wt gain, hemoptysis, hematochezia or melena. Physical Examination:    Visit Vitals    /65 (BP 1 Location: Left arm, BP Patient Position: Sitting)    Pulse 71    Temp 98.1 °F (36.7 °C) (Oral)    Resp 15    Ht 5' 10\" (1.778 m)    Wt 192 lb (87.1 kg)    SpO2 91%    BMI 27.55 kg/m2      General:  Alert, cooperative, no distress. Head:  Normocephalic, without obvious abnormality, atraumatic. Eyes:  Conjunctivae/corneas clear. Pupils equal, round, reactive to light. Chest wall:  No tenderness or deformity. Extremities: Extremities normal, atraumatic, no cyanosis or edema. Skin:             Lymph nodes: Cervical and supraclavicular nodes are normal.   Neurologic: Moves all extremities, fluent speech         ASSESSMENT AND PLAN:    1. Sutures removed. 2.  Patient tolerated procedure well. No orders of the defined types were placed in this encounter.       Piyush Villeda MD, [address]

## 2018-08-08 NOTE — PROGRESS NOTES
1. Have you been to the ER, urgent care clinic since your last visit? Hospitalized since your last visit? No    2. Have you seen or consulted any other health care providers outside of the [address] [place] [address] since your last visit? Include any pap smears or colon screening. Yes, Dr. Dian Orta and Dr. Julito Johns, for dermatology in July 2018 and Dr. Ean Rodriguez for endocrinology in June 2018.

## 2018-08-15 RX ORDER — LOSARTAN POTASSIUM 25 MG/1
25 TABLET ORAL DAILY
Qty: 90 TAB | Refills: 1 | Status: SHIPPED | OUTPATIENT
Start: 2018-08-15 | End: 2018-09-19 | Stop reason: SDUPTHER

## 2018-08-30 ENCOUNTER — TELEPHONE (OUTPATIENT)
Dept: FAMILY MEDICINE CLINIC | Age: 63
End: 2018-08-30

## 2018-08-30 DIAGNOSIS — Z12.31 SCREENING MAMMOGRAM, ENCOUNTER FOR: Primary | ICD-10-CM

## 2018-09-04 ENCOUNTER — TELEPHONE (OUTPATIENT)
Dept: FAMILY MEDICINE CLINIC | Age: 63
End: 2018-09-04

## 2018-09-04 NOTE — TELEPHONE ENCOUNTER
Pt is requesting a print out of her 2013 Coloscopy done by Dr. Oleksandr Jones. I told her a nurse would call her when it is printed so she would know when to come pick it up.

## 2018-09-04 NOTE — TELEPHONE ENCOUNTER
Disregard this message I will inform pt she needs to contact [phone] for reports

## 2018-09-19 PROBLEM — F41.1 GAD (GENERALIZED ANXIETY DISORDER): Status: ACTIVE | Noted: 2018-09-19

## 2018-09-19 PROBLEM — E03.9 ACQUIRED HYPOTHYROIDISM: Status: ACTIVE | Noted: 2018-09-19

## 2019-03-19 PROBLEM — A63.0 CONDYLOMA ACUMINATA: Status: ACTIVE | Noted: 2019-03-19
